# Patient Record
Sex: MALE | Race: WHITE | NOT HISPANIC OR LATINO | Employment: FULL TIME | URBAN - METROPOLITAN AREA
[De-identification: names, ages, dates, MRNs, and addresses within clinical notes are randomized per-mention and may not be internally consistent; named-entity substitution may affect disease eponyms.]

---

## 2018-01-26 ENCOUNTER — CONVERSION ENCOUNTER (OUTPATIENT)
Dept: FAMILY MEDICINE CLINIC | Facility: CLINIC | Age: 58
End: 2018-01-26

## 2018-01-26 ENCOUNTER — OFFICE VISIT CONVERTED (OUTPATIENT)
Dept: FAMILY MEDICINE CLINIC | Facility: CLINIC | Age: 58
End: 2018-01-26
Attending: NURSE PRACTITIONER

## 2018-04-03 ENCOUNTER — OFFICE VISIT CONVERTED (OUTPATIENT)
Dept: FAMILY MEDICINE CLINIC | Facility: CLINIC | Age: 58
End: 2018-04-03
Attending: NURSE PRACTITIONER

## 2018-06-06 ENCOUNTER — OFFICE VISIT CONVERTED (OUTPATIENT)
Dept: FAMILY MEDICINE CLINIC | Facility: CLINIC | Age: 58
End: 2018-06-06
Attending: NURSE PRACTITIONER

## 2018-10-12 ENCOUNTER — OFFICE VISIT CONVERTED (OUTPATIENT)
Dept: FAMILY MEDICINE CLINIC | Facility: CLINIC | Age: 58
End: 2018-10-12
Attending: NURSE PRACTITIONER

## 2018-12-11 ENCOUNTER — CONVERSION ENCOUNTER (OUTPATIENT)
Dept: FAMILY MEDICINE CLINIC | Facility: CLINIC | Age: 58
End: 2018-12-11

## 2018-12-11 ENCOUNTER — OFFICE VISIT CONVERTED (OUTPATIENT)
Dept: FAMILY MEDICINE CLINIC | Facility: CLINIC | Age: 58
End: 2018-12-11
Attending: NURSE PRACTITIONER

## 2019-02-15 ENCOUNTER — OFFICE VISIT CONVERTED (OUTPATIENT)
Dept: FAMILY MEDICINE CLINIC | Facility: CLINIC | Age: 59
End: 2019-02-15
Attending: NURSE PRACTITIONER

## 2019-06-11 ENCOUNTER — CONVERSION ENCOUNTER (OUTPATIENT)
Dept: FAMILY MEDICINE CLINIC | Facility: CLINIC | Age: 59
End: 2019-06-11

## 2019-06-11 ENCOUNTER — OFFICE VISIT CONVERTED (OUTPATIENT)
Dept: FAMILY MEDICINE CLINIC | Facility: CLINIC | Age: 59
End: 2019-06-11
Attending: NURSE PRACTITIONER

## 2019-06-11 ENCOUNTER — HOSPITAL ENCOUNTER (OUTPATIENT)
Dept: LAB | Facility: HOSPITAL | Age: 59
Discharge: HOME | End: 2019-06-11
Attending: NURSE PRACTITIONER

## 2019-06-11 LAB
ALBUMIN SERPL-MCNC: 4.4 G/DL (ref 3.5–5)
ALBUMIN/GLOB SERPL: 1.6 {RATIO} (ref 1.4–2.6)
ALP SERPL-CCNC: 100 U/L (ref 56–119)
ALT SERPL-CCNC: 24 U/L (ref 10–40)
ANION GAP SERPL CALC-SCNC: 17 MMOL/L (ref 8–19)
AST SERPL-CCNC: 25 U/L (ref 15–50)
BASOPHILS # BLD AUTO: 0.07 10*3/UL (ref 0–0.2)
BASOPHILS NFR BLD AUTO: 0.9 % (ref 0–3)
BILIRUB SERPL-MCNC: 0.35 MG/DL (ref 0.2–1.3)
BUN SERPL-MCNC: 12 MG/DL (ref 5–25)
BUN/CREAT SERPL: 12 {RATIO} (ref 6–20)
CALCIUM SERPL-MCNC: 9.2 MG/DL (ref 8.7–10.4)
CHLORIDE SERPL-SCNC: 100 MMOL/L (ref 99–111)
CHOLEST SERPL-MCNC: 192 MG/DL (ref 107–200)
CHOLEST/HDLC SERPL: 4.9 {RATIO} (ref 3–6)
CONV ABS IMM GRAN: 0.03 10*3/UL (ref 0–0.2)
CONV CO2: 28 MMOL/L (ref 22–32)
CONV CREATININE URINE, RANDOM: 172.1 MG/DL (ref 10–300)
CONV IMMATURE GRAN: 0.4 % (ref 0–1.8)
CONV MICROALBUM.,U,RANDOM: <12 MG/L (ref 0–20)
CONV TOTAL PROTEIN: 7.1 G/DL (ref 6.3–8.2)
CREAT UR-MCNC: 1.03 MG/DL (ref 0.7–1.2)
DEPRECATED RDW RBC AUTO: 43.2 FL (ref 35.1–43.9)
EOSINOPHIL # BLD AUTO: 0.47 10*3/UL (ref 0–0.7)
EOSINOPHIL # BLD AUTO: 6.1 % (ref 0–7)
ERYTHROCYTE [DISTWIDTH] IN BLOOD BY AUTOMATED COUNT: 12.9 % (ref 11.6–14.4)
EST. AVERAGE GLUCOSE BLD GHB EST-MCNC: 120 MG/DL
GFR SERPLBLD BASED ON 1.73 SQ M-ARVRAT: >60 ML/MIN/{1.73_M2}
GLOBULIN UR ELPH-MCNC: 2.7 G/DL (ref 2–3.5)
GLUCOSE SERPL-MCNC: 127 MG/DL (ref 70–99)
HBA1C MFR BLD: 14.6 G/DL (ref 14–18)
HBA1C MFR BLD: 5.8 % (ref 3.5–5.7)
HCT VFR BLD AUTO: 44.5 % (ref 42–52)
HDLC SERPL-MCNC: 39 MG/DL (ref 40–60)
LDLC SERPL CALC-MCNC: 110 MG/DL (ref 70–100)
LYMPHOCYTES # BLD AUTO: 1.6 10*3/UL (ref 1–5)
MCH RBC QN AUTO: 30.1 PG (ref 27–31)
MCHC RBC AUTO-ENTMCNC: 32.8 G/DL (ref 33–37)
MCV RBC AUTO: 91.8 FL (ref 80–96)
MICROALBUMIN/CREAT UR: 7 MG/G{CRE} (ref 0–25)
MONOCYTES # BLD AUTO: 0.73 10*3/UL (ref 0.2–1.2)
MONOCYTES NFR BLD AUTO: 9.5 % (ref 3–10)
NEUTROPHILS # BLD AUTO: 4.79 10*3/UL (ref 2–8)
NEUTROPHILS NFR BLD AUTO: 62.3 % (ref 30–85)
NRBC CBCN: 0 % (ref 0–0.7)
OSMOLALITY SERPL CALC.SUM OF ELEC: 293 MOSM/KG (ref 273–304)
PLATELET # BLD AUTO: 216 10*3/UL (ref 130–400)
PMV BLD AUTO: 10.2 FL (ref 9.4–12.4)
POTASSIUM SERPL-SCNC: 4.3 MMOL/L (ref 3.5–5.3)
RBC # BLD AUTO: 4.85 10*6/UL (ref 4.7–6.1)
SODIUM SERPL-SCNC: 141 MMOL/L (ref 135–147)
TRIGL SERPL-MCNC: 214 MG/DL (ref 40–150)
VARIANT LYMPHS NFR BLD MANUAL: 20.8 % (ref 20–45)
VLDLC SERPL-MCNC: 43 MG/DL (ref 5–37)
WBC # BLD AUTO: 7.69 10*3/UL (ref 4.8–10.8)

## 2019-06-20 ENCOUNTER — HOSPITAL ENCOUNTER (OUTPATIENT)
Dept: URGENT CARE | Facility: CLINIC | Age: 59
Discharge: HOME | End: 2019-06-20
Attending: FAMILY MEDICINE

## 2019-10-11 ENCOUNTER — HOSPITAL ENCOUNTER (OUTPATIENT)
Dept: GENERAL RADIOLOGY | Facility: HOSPITAL | Age: 59
Discharge: HOME | End: 2019-10-11
Attending: NURSE PRACTITIONER

## 2019-10-11 ENCOUNTER — OFFICE VISIT CONVERTED (OUTPATIENT)
Dept: FAMILY MEDICINE CLINIC | Facility: CLINIC | Age: 59
End: 2019-10-11
Attending: NURSE PRACTITIONER

## 2019-10-29 ENCOUNTER — OFFICE VISIT CONVERTED (OUTPATIENT)
Dept: FAMILY MEDICINE CLINIC | Facility: CLINIC | Age: 59
End: 2019-10-29
Attending: NURSE PRACTITIONER

## 2019-11-07 ENCOUNTER — HOSPITAL ENCOUNTER (OUTPATIENT)
Dept: GENERAL RADIOLOGY | Facility: HOSPITAL | Age: 59
Discharge: HOME | End: 2019-11-07
Attending: NURSE PRACTITIONER

## 2019-12-04 ENCOUNTER — CONVERSION ENCOUNTER (OUTPATIENT)
Dept: ORTHOPEDIC SURGERY | Facility: CLINIC | Age: 59
End: 2019-12-04

## 2019-12-04 ENCOUNTER — OFFICE VISIT CONVERTED (OUTPATIENT)
Dept: ORTHOPEDIC SURGERY | Facility: CLINIC | Age: 59
End: 2019-12-04
Attending: ORTHOPAEDIC SURGERY

## 2019-12-11 ENCOUNTER — OFFICE VISIT CONVERTED (OUTPATIENT)
Dept: FAMILY MEDICINE CLINIC | Facility: CLINIC | Age: 59
End: 2019-12-11
Attending: NURSE PRACTITIONER

## 2019-12-11 ENCOUNTER — CONVERSION ENCOUNTER (OUTPATIENT)
Dept: FAMILY MEDICINE CLINIC | Facility: CLINIC | Age: 59
End: 2019-12-11

## 2020-01-21 ENCOUNTER — HOSPITAL ENCOUNTER (OUTPATIENT)
Dept: LAB | Facility: HOSPITAL | Age: 60
Discharge: HOME | End: 2020-01-21
Attending: NURSE PRACTITIONER

## 2020-01-21 LAB
ALBUMIN SERPL-MCNC: 4.4 G/DL (ref 3.5–5)
ALBUMIN/GLOB SERPL: 1.6 {RATIO} (ref 1.4–2.6)
ALP SERPL-CCNC: 97 U/L (ref 56–119)
ALT SERPL-CCNC: 27 U/L (ref 10–40)
ANION GAP SERPL CALC-SCNC: 17 MMOL/L (ref 8–19)
AST SERPL-CCNC: 27 U/L (ref 15–50)
BILIRUB SERPL-MCNC: 0.56 MG/DL (ref 0.2–1.3)
BUN SERPL-MCNC: 11 MG/DL (ref 5–25)
BUN/CREAT SERPL: 11 {RATIO} (ref 6–20)
CALCIUM SERPL-MCNC: 9.5 MG/DL (ref 8.7–10.4)
CHLORIDE SERPL-SCNC: 97 MMOL/L (ref 99–111)
CHOLEST SERPL-MCNC: 201 MG/DL (ref 107–200)
CHOLEST/HDLC SERPL: 6.1 {RATIO} (ref 3–6)
CONV CO2: 28 MMOL/L (ref 22–32)
CONV CREATININE URINE, RANDOM: 107.5 MG/DL (ref 10–300)
CONV MICROALBUM.,U,RANDOM: <12 MG/L (ref 0–20)
CONV TOTAL PROTEIN: 7.2 G/DL (ref 6.3–8.2)
CREAT UR-MCNC: 1 MG/DL (ref 0.7–1.2)
EST. AVERAGE GLUCOSE BLD GHB EST-MCNC: 131 MG/DL
GFR SERPLBLD BASED ON 1.73 SQ M-ARVRAT: >60 ML/MIN/{1.73_M2}
GLOBULIN UR ELPH-MCNC: 2.8 G/DL (ref 2–3.5)
GLUCOSE SERPL-MCNC: 179 MG/DL (ref 70–99)
HBA1C MFR BLD: 6.2 % (ref 3.5–5.7)
HDLC SERPL-MCNC: 33 MG/DL (ref 40–60)
LDLC SERPL CALC-MCNC: 118 MG/DL (ref 70–100)
MICROALBUMIN/CREAT UR: 11.2 MG/G{CRE} (ref 0–25)
OSMOLALITY SERPL CALC.SUM OF ELEC: 290 MOSM/KG (ref 273–304)
POTASSIUM SERPL-SCNC: 4.3 MMOL/L (ref 3.5–5.3)
SODIUM SERPL-SCNC: 138 MMOL/L (ref 135–147)
TRIGL SERPL-MCNC: 250 MG/DL (ref 40–150)
VLDLC SERPL-MCNC: 50 MG/DL (ref 5–37)

## 2020-02-14 ENCOUNTER — HOSPITAL ENCOUNTER (OUTPATIENT)
Dept: URGENT CARE | Facility: CLINIC | Age: 60
Discharge: HOME | End: 2020-02-14
Attending: PHYSICIAN ASSISTANT

## 2020-04-14 ENCOUNTER — CONVERSION ENCOUNTER (OUTPATIENT)
Dept: FAMILY MEDICINE CLINIC | Facility: CLINIC | Age: 60
End: 2020-04-14

## 2020-04-14 ENCOUNTER — HOSPITAL ENCOUNTER (OUTPATIENT)
Dept: GENERAL RADIOLOGY | Facility: HOSPITAL | Age: 60
Discharge: HOME | End: 2020-04-14
Attending: NURSE PRACTITIONER

## 2020-04-14 ENCOUNTER — OFFICE VISIT CONVERTED (OUTPATIENT)
Dept: FAMILY MEDICINE CLINIC | Facility: CLINIC | Age: 60
End: 2020-04-14
Attending: NURSE PRACTITIONER

## 2020-05-13 ENCOUNTER — HOSPITAL ENCOUNTER (OUTPATIENT)
Dept: LAB | Facility: HOSPITAL | Age: 60
Discharge: HOME | End: 2020-05-13
Attending: NURSE PRACTITIONER

## 2020-05-13 LAB
ALBUMIN SERPL-MCNC: 4.4 G/DL (ref 3.5–5)
ALBUMIN/GLOB SERPL: 1.6 {RATIO} (ref 1.4–2.6)
ALP SERPL-CCNC: 96 U/L (ref 56–119)
ALT SERPL-CCNC: 30 U/L (ref 10–40)
ANION GAP SERPL CALC-SCNC: 27 MMOL/L (ref 8–19)
APPEARANCE UR: CLEAR
AST SERPL-CCNC: 28 U/L (ref 15–50)
BASOPHILS # BLD AUTO: 0.06 10*3/UL (ref 0–0.2)
BASOPHILS NFR BLD AUTO: 0.9 % (ref 0–3)
BILIRUB SERPL-MCNC: 0.53 MG/DL (ref 0.2–1.3)
BILIRUB UR QL: NEGATIVE
BUN SERPL-MCNC: 12 MG/DL (ref 5–25)
BUN/CREAT SERPL: 10 {RATIO} (ref 6–20)
CALCIUM SERPL-MCNC: 9.5 MG/DL (ref 8.7–10.4)
CHLORIDE SERPL-SCNC: 105 MMOL/L (ref 99–111)
CHOLEST SERPL-MCNC: 214 MG/DL (ref 107–200)
CHOLEST/HDLC SERPL: 5.9 {RATIO} (ref 3–6)
COLOR UR: YELLOW
CONV ABS IMM GRAN: 0.02 10*3/UL (ref 0–0.2)
CONV CO2: 19 MMOL/L (ref 22–32)
CONV COLLECTION SOURCE (UA): NORMAL
CONV CREATININE URINE, RANDOM: 131.8 MG/DL (ref 10–300)
CONV IMMATURE GRAN: 0.3 % (ref 0–1.8)
CONV MICROALBUM.,U,RANDOM: <12 MG/L (ref 0–20)
CONV TOTAL PROTEIN: 7.2 G/DL (ref 6.3–8.2)
CONV UROBILINOGEN IN URINE BY AUTOMATED TEST STRIP: 1 {EHRLICHU}/DL (ref 0.1–1)
CREAT UR-MCNC: 1.17 MG/DL (ref 0.7–1.2)
DEPRECATED RDW RBC AUTO: 43.1 FL (ref 35.1–43.9)
EOSINOPHIL # BLD AUTO: 0.5 10*3/UL (ref 0–0.7)
EOSINOPHIL # BLD AUTO: 7.9 % (ref 0–7)
ERYTHROCYTE [DISTWIDTH] IN BLOOD BY AUTOMATED COUNT: 12.7 % (ref 11.6–14.4)
EST. AVERAGE GLUCOSE BLD GHB EST-MCNC: 151 MG/DL
GFR SERPLBLD BASED ON 1.73 SQ M-ARVRAT: >60 ML/MIN/{1.73_M2}
GLOBULIN UR ELPH-MCNC: 2.8 G/DL (ref 2–3.5)
GLUCOSE SERPL-MCNC: 127 MG/DL (ref 70–99)
GLUCOSE UR QL: NEGATIVE MG/DL
HBA1C MFR BLD: 6.9 % (ref 3.5–5.7)
HCT VFR BLD AUTO: 44 % (ref 42–52)
HDLC SERPL-MCNC: 36 MG/DL (ref 40–60)
HGB BLD-MCNC: 14.2 G/DL (ref 14–18)
HGB UR QL STRIP: NEGATIVE
KETONES UR QL STRIP: NEGATIVE MG/DL
LDLC SERPL CALC-MCNC: 131 MG/DL (ref 70–100)
LEUKOCYTE ESTERASE UR QL STRIP: NEGATIVE
LYMPHOCYTES # BLD AUTO: 1.44 10*3/UL (ref 1–5)
LYMPHOCYTES NFR BLD AUTO: 22.7 % (ref 20–45)
MCH RBC QN AUTO: 30.1 PG (ref 27–31)
MCHC RBC AUTO-ENTMCNC: 32.3 G/DL (ref 33–37)
MCV RBC AUTO: 93.2 FL (ref 80–96)
MICROALBUMIN/CREAT UR: 9.1 MG/G{CRE} (ref 0–25)
MONOCYTES # BLD AUTO: 0.51 10*3/UL (ref 0.2–1.2)
MONOCYTES NFR BLD AUTO: 8.1 % (ref 3–10)
NEUTROPHILS # BLD AUTO: 3.8 10*3/UL (ref 2–8)
NEUTROPHILS NFR BLD AUTO: 60.1 % (ref 30–85)
NITRITE UR QL STRIP: NEGATIVE
NRBC CBCN: 0 % (ref 0–0.7)
OSMOLALITY SERPL CALC.SUM OF ELEC: 303 MOSM/KG (ref 273–304)
PH UR STRIP.AUTO: 5.5 [PH] (ref 5–8)
PLATELET # BLD AUTO: 206 10*3/UL (ref 130–400)
PMV BLD AUTO: 10.2 FL (ref 9.4–12.4)
POTASSIUM SERPL-SCNC: 4.5 MMOL/L (ref 3.5–5.3)
PROT UR QL: NEGATIVE MG/DL
RBC # BLD AUTO: 4.72 10*6/UL (ref 4.7–6.1)
SODIUM SERPL-SCNC: 146 MMOL/L (ref 135–147)
SP GR UR: 1.03 (ref 1–1.03)
TRIGL SERPL-MCNC: 236 MG/DL (ref 40–150)
VLDLC SERPL-MCNC: 47 MG/DL (ref 5–37)
WBC # BLD AUTO: 6.33 10*3/UL (ref 4.8–10.8)

## 2020-06-12 ENCOUNTER — OFFICE VISIT CONVERTED (OUTPATIENT)
Dept: FAMILY MEDICINE CLINIC | Facility: CLINIC | Age: 60
End: 2020-06-12
Attending: NURSE PRACTITIONER

## 2020-07-13 ENCOUNTER — OFFICE VISIT CONVERTED (OUTPATIENT)
Dept: FAMILY MEDICINE CLINIC | Facility: CLINIC | Age: 60
End: 2020-07-13
Attending: NURSE PRACTITIONER

## 2020-07-13 ENCOUNTER — CONVERSION ENCOUNTER (OUTPATIENT)
Dept: FAMILY MEDICINE CLINIC | Facility: CLINIC | Age: 60
End: 2020-07-13

## 2020-08-03 ENCOUNTER — HOSPITAL ENCOUNTER (OUTPATIENT)
Dept: URGENT CARE | Facility: CLINIC | Age: 60
Discharge: HOME | End: 2020-08-03
Attending: NURSE PRACTITIONER

## 2020-08-05 LAB — SARS-COV-2 RNA SPEC QL NAA+PROBE: NOT DETECTED

## 2020-09-14 ENCOUNTER — OFFICE VISIT CONVERTED (OUTPATIENT)
Dept: ORTHOPEDIC SURGERY | Facility: CLINIC | Age: 60
End: 2020-09-14
Attending: ORTHOPAEDIC SURGERY

## 2020-09-14 ENCOUNTER — CONVERSION ENCOUNTER (OUTPATIENT)
Dept: ORTHOPEDIC SURGERY | Facility: CLINIC | Age: 60
End: 2020-09-14

## 2020-10-02 ENCOUNTER — HOSPITAL ENCOUNTER (OUTPATIENT)
Dept: LAB | Facility: HOSPITAL | Age: 60
Discharge: HOME | End: 2020-10-02
Attending: NURSE PRACTITIONER

## 2020-10-02 ENCOUNTER — OFFICE VISIT CONVERTED (OUTPATIENT)
Dept: FAMILY MEDICINE CLINIC | Facility: CLINIC | Age: 60
End: 2020-10-02
Attending: NURSE PRACTITIONER

## 2020-10-02 LAB
ALBUMIN SERPL-MCNC: 4.6 G/DL (ref 3.5–5)
ALBUMIN/GLOB SERPL: 1.8 {RATIO} (ref 1.4–2.6)
ALP SERPL-CCNC: 101 U/L (ref 56–119)
ALT SERPL-CCNC: 25 U/L (ref 10–40)
ANION GAP SERPL CALC-SCNC: 18 MMOL/L (ref 8–19)
AST SERPL-CCNC: 27 U/L (ref 15–50)
BILIRUB SERPL-MCNC: 0.83 MG/DL (ref 0.2–1.3)
BUN SERPL-MCNC: 16 MG/DL (ref 5–25)
BUN/CREAT SERPL: 15 {RATIO} (ref 6–20)
CALCIUM SERPL-MCNC: 9.1 MG/DL (ref 8.7–10.4)
CHLORIDE SERPL-SCNC: 97 MMOL/L (ref 99–111)
CHOLEST SERPL-MCNC: 140 MG/DL (ref 107–200)
CHOLEST/HDLC SERPL: 2.7 {RATIO} (ref 3–6)
CONV CO2: 27 MMOL/L (ref 22–32)
CONV CREATININE URINE, RANDOM: 108.3 MG/DL (ref 10–300)
CONV MICROALBUM.,U,RANDOM: <12 MG/L (ref 0–20)
CONV TOTAL PROTEIN: 7.2 G/DL (ref 6.3–8.2)
CREAT UR-MCNC: 1.04 MG/DL (ref 0.7–1.2)
EST. AVERAGE GLUCOSE BLD GHB EST-MCNC: 131 MG/DL
GFR SERPLBLD BASED ON 1.73 SQ M-ARVRAT: >60 ML/MIN/{1.73_M2}
GLOBULIN UR ELPH-MCNC: 2.6 G/DL (ref 2–3.5)
GLUCOSE SERPL-MCNC: 116 MG/DL (ref 70–99)
HBA1C MFR BLD: 6.2 % (ref 3.5–5.7)
HDLC SERPL-MCNC: 51 MG/DL (ref 40–60)
LDLC SERPL CALC-MCNC: 66 MG/DL (ref 70–100)
MICROALBUMIN/CREAT UR: 11.1 MG/G{CRE} (ref 0–25)
OSMOLALITY SERPL CALC.SUM OF ELEC: 288 MOSM/KG (ref 273–304)
POTASSIUM SERPL-SCNC: 4.2 MMOL/L (ref 3.5–5.3)
SODIUM SERPL-SCNC: 138 MMOL/L (ref 135–147)
TRIGL SERPL-MCNC: 117 MG/DL (ref 40–150)
VLDLC SERPL-MCNC: 23 MG/DL (ref 5–37)

## 2020-12-18 ENCOUNTER — OFFICE VISIT CONVERTED (OUTPATIENT)
Dept: FAMILY MEDICINE CLINIC | Facility: CLINIC | Age: 60
End: 2020-12-18
Attending: NURSE PRACTITIONER

## 2020-12-18 ENCOUNTER — CONVERSION ENCOUNTER (OUTPATIENT)
Dept: FAMILY MEDICINE CLINIC | Facility: CLINIC | Age: 60
End: 2020-12-18

## 2021-03-13 ENCOUNTER — HOSPITAL ENCOUNTER (OUTPATIENT)
Dept: OTHER | Facility: HOSPITAL | Age: 61
Discharge: HOME | End: 2021-03-13
Attending: NURSE PRACTITIONER

## 2021-03-13 LAB
ALBUMIN SERPL-MCNC: 4 G/DL (ref 3.5–5)
ALBUMIN/GLOB SERPL: 1.5 {RATIO} (ref 1.4–2.6)
ALP SERPL-CCNC: 83 U/L (ref 56–119)
ALT SERPL-CCNC: 18 U/L (ref 10–40)
ANION GAP SERPL CALC-SCNC: 11 MMOL/L (ref 8–19)
AST SERPL-CCNC: 22 U/L (ref 15–50)
BILIRUB SERPL-MCNC: 0.72 MG/DL (ref 0.2–1.3)
BUN SERPL-MCNC: 12 MG/DL (ref 5–25)
BUN/CREAT SERPL: 12 {RATIO} (ref 6–20)
CALCIUM SERPL-MCNC: 9 MG/DL (ref 8.7–10.4)
CHLORIDE SERPL-SCNC: 100 MMOL/L (ref 99–111)
CHOLEST SERPL-MCNC: 121 MG/DL (ref 107–200)
CHOLEST/HDLC SERPL: 3.1 {RATIO} (ref 3–6)
CONV CO2: 30 MMOL/L (ref 22–32)
CONV CREATININE URINE, RANDOM: 132.6 MG/DL (ref 10–300)
CONV MICROALBUM.,U,RANDOM: <12 MG/L (ref 0–20)
CONV TOTAL PROTEIN: 6.7 G/DL (ref 6.3–8.2)
CREAT UR-MCNC: 1.02 MG/DL (ref 0.7–1.2)
EST. AVERAGE GLUCOSE BLD GHB EST-MCNC: 126 MG/DL
GFR SERPLBLD BASED ON 1.73 SQ M-ARVRAT: >60 ML/MIN/{1.73_M2}
GLOBULIN UR ELPH-MCNC: 2.7 G/DL (ref 2–3.5)
GLUCOSE SERPL-MCNC: 112 MG/DL (ref 70–99)
HBA1C MFR BLD: 6 % (ref 3.5–5.7)
HDLC SERPL-MCNC: 39 MG/DL (ref 40–60)
LDLC SERPL CALC-MCNC: 58 MG/DL (ref 70–100)
MICROALBUMIN/CREAT UR: 9 MG/G{CRE} (ref 0–25)
OSMOLALITY SERPL CALC.SUM OF ELEC: 285 MOSM/KG (ref 273–304)
POTASSIUM SERPL-SCNC: 4.2 MMOL/L (ref 3.5–5.3)
SODIUM SERPL-SCNC: 137 MMOL/L (ref 135–147)
TRIGL SERPL-MCNC: 122 MG/DL (ref 40–150)
VLDLC SERPL-MCNC: 24 MG/DL (ref 5–37)

## 2021-03-18 ENCOUNTER — CONVERSION ENCOUNTER (OUTPATIENT)
Dept: OTHER | Facility: HOSPITAL | Age: 61
End: 2021-03-18

## 2021-03-18 ENCOUNTER — OFFICE VISIT CONVERTED (OUTPATIENT)
Dept: FAMILY MEDICINE CLINIC | Facility: CLINIC | Age: 61
End: 2021-03-18
Attending: NURSE PRACTITIONER

## 2021-04-01 ENCOUNTER — HOSPITAL ENCOUNTER (OUTPATIENT)
Dept: VACCINE CLINIC | Facility: HOSPITAL | Age: 61
Discharge: HOME | End: 2021-04-01
Attending: INTERNAL MEDICINE

## 2021-04-23 ENCOUNTER — HOSPITAL ENCOUNTER (OUTPATIENT)
Dept: VACCINE CLINIC | Facility: HOSPITAL | Age: 61
Discharge: HOME | End: 2021-04-23
Attending: INTERNAL MEDICINE

## 2021-05-10 NOTE — H&P
History and Physical      Patient Name: Idris Park   Patient ID: [id number]   Sex: Male   YOB: 1960    Primary Care Provider: Dae CARRILLO   Referring Provider: Dae CARRILLO    Visit Date: September 14, 2020    Provider: Jb Campos MD   Location: [facility]   Location Address: [address] [place] [address]  [id number]   Location Phone: [phone]          Chief Complaint  · Right Knee Pain  · Left Shoulder Pain      History Of Present Illness  Idris Park is a 60 year old /White male who presents today to [facility]. Patient is here following up for right knee pain and left shoulder pain. Patient states the knee pain is on the medial and lateral sides of his knee. He has pain with descending on stairs. Patient has a history of left total knee arthroplasty performed 5 years ago and is doing very well. Patient states increasing pain of the left shoulder, pointing to around the AC joint. Patient has had a MRI on his left shoulder.       Past Medical History  ***No Significant Medical History; Arthritis; Diabetes; Limb Pain; Limb Swelling; Screening for colon cancer         Past Surgical History  Artificial Joints/Limbs; Hernia; Joint Surgery         Medication List  Co Q-10 300 mg oral capsule; etodolac 300 mg oral capsule; Fish Oil 340-1,000 mg oral capsule; Januvia 50 mg oral tablet; Lasix 40 mg oral tablet; Neurontin 300 mg oral capsule; rosuvastatin 5 mg oral tablet; tramadol 50 mg oral tablet         Allergy List  Keflex         Family Medical History  Cancer, Unspecified; Renal Calculus; Family history of breast cancer         Social History  Alcohol (Current some day); Alcohol Use (Current some day); Caffeine (Current every day); lives with spouse; .; Recreational Drug Use (Never); Second hand smoke exposure (Never); Tobacco (Never); Working         Review of Systems  · Constitutional  o Denies  o : fever, chills, weight  "loss  · Cardiovascular  o Denies  o : chest pain, shortness of breath  · Gastrointestinal  o Denies  o : liver disease, heartburn, nausea, blood in stools  · Genitourinary  o Denies  o : painful urination, blood in urine  · Integument  o Denies  o : rash, itching  · Neurologic  o Denies  o : headache, weakness, loss of consciousness  · Musculoskeletal  o Admits  o : painful, swollen joints  · Psychiatric  o Denies  o : drug/alcohol addiction, anxiety, depression      Vitals  Date Time BP Position Site L\R Cuff Size HR RR TEMP (F) WT  HT  BMI kg/m2 BSA m2 O2 Sat        09/14/2020 09:01 AM         258lbs 0oz 5'  9\" 38.1 2.39           Physical Examination  · Constitutional  o Appearance  o : well developed, well-nourished, no obvious deformities present  · Head and Face  o Head  o :   § Inspection  § : normocephalic  o Face  o :   § Inspection  § : no facial lesions  · Eyes  o Conjunctivae  o : conjunctivae normal  o Sclerae  o : sclerae white  · Ears, Nose, Mouth and Throat  o Ears  o :   § External Ears  § : appearance within normal limits  § Hearing  § : intact  o Nose  o :   § External Nose  § : appearance normal  · Neck  o Inspection/Palpation  o : normal appearance  o Range of Motion  o : full range of motion  · Respiratory  o Respiratory Effort  o : breathing unlabored  o Inspection of Chest  o : normal appearance  o Auscultation of Lungs  o : no audible wheezing or rales  · Cardiovascular  o Heart  o : regular rate  · Gastrointestinal  o Abdominal Examination  o : soft and non-tender  · Skin and Subcutaneous Tissue  o General Inspection  o : intact, no rashes  · Psychiatric  o General  o : Alert and oriented x3  o Judgement and Insight  o : judgment and insight intact  o Mood and Affect  o : mood normal, affect appropriate  · Left Shoulder  o Inspection  o : No skin discoloration atrophy or swelling. Palpable tenderness over the AC joint. Palpable tenderness subacromial bursa. Full range of motion. Negative " empty can. Negative Neers. Negative Guzman. Neurovascularly and sensation grossly intact.   · Right Knee  o Inspection  o : No skin discoloration, atrophy or swelling. Genu varus deformity. Full extension. Full flexion. Mild tenderness on medial and lateral joint line. Calf supple, nontender. Neurovascularly intact. X-ray right knee is advanced osteoarthritis, Kelligren-Sushil score of 4.   · Injection Note/Aspiration Note  o Site  o : right knee  o Procedure  o : Procedure: After educating the patient, patient gave consent for procedure. After using Chloraprep, the joint space was injected. The patient tolerated the procedure well.   o Medication  o : 80 mg of DepoMedrol with 9cc of 1% Lidocaine  · In Office Procedures  o View  o : LAT/SUNRISE/STANDING   o Site  o : right, knee  o Indication  o : Right knee pain  o Study  o : X-rays ordered, taken in the office, and reviewed today.          Assessment  · Primary osteoarthritis of right knee     715.16/M17.11  · Primary osteoarthritis of left AC joint.     715.11/M19.019  · Right knee pain, unspecified chronicity     719.46/M25.561  · Left shoulder pain, unspecified chronicity     719.41/M25.512      Plan  · Orders  o Depo-Medrol injection 80mg () - - 09/14/2020   Lot [id number] Exp 07 2021 [employer] Administered by JOSEPH Campos MD  o Knee Intra-articular Injection without US Guidance [facility] ([id number]) - - 09/14/2020   Lot [id number] Exp 08 01 2021 [employer] Administered by JOSEPH Campos MD  o Knee (Right) [facility] Preferred View ([id number]) - 719.46/M25.561 - 09/14/2020  · Medications  o Medications have been Reconciled  o Transition of Care or Provider Policy  · Instructions  o Reviewed the patient's Past Medical, Social, and Family history as well as the ROS at today's visit, no changes.  o Call or return if worsening symptoms.  o This note is transcribed by Eunice barkley/adriana  o Right knee steroid injection. We discussed potential of right total knee arthroplasty.              Electronically Signed by: Eunice Denny, -Author on September 14, 2020 03:29:53 PM  Electronically Co-signed by: Neris Marino PA-C -Reviewer on September 14, 2020 04:57:02 PM  Electronically Co-signed by: Jb Campos MD -Reviewer on September 14, 2020 05:31:22 PM

## 2021-05-12 NOTE — PROGRESS NOTES
Progress Note      Patient Name: Idris Park   Patient ID: [id number]   Sex: Male   YOB: 1960    Primary Care Provider: Dae CARRILLO   Referring Provider: Dae CARRILLO    Visit Date: April 14, 2020    Provider: LORENA Olivares   Location: [facility]   Location Address: [address] [place] [address], [address]  [id number]   Location Phone: [phone]          Chief Complaint     The patient is here for left shoulder pain, fell out of bed due to muscle cramp       History Of Present Illness  Idris Park is a 60 year old /White male who presents for evaluation and treatment of:      left shoulder pain for the past 3 weeks.  States he frequently has muscle cramps at night and he fell out of bed a few weeks ago.  The pain is intermittent and described as sharp, only lasting a few seconds.  He does not take any additional medication for the pain other than what he already has prescribed.  He uses Biofreeze nightly before he goes to work.  He does not notice any specific precipitating factors.  He denies numbness or tingling in his arm.       Past Medical History  Disease Name Date Onset Notes   Arthritis --  --    Diabetes --  --    Limb Pain --  --    Limb Swelling --  --    Screening for colon cancer 11/23/17 Cologstaci         Past Surgical History  Procedure Name Date Notes   Artificial Joints/Limbs --  --    Hernia 1967 & 2011 --    Joint Surgery --  --          Medication List  Name Date Started Instructions   atorvastatin 10 mg oral tablet 01/21/2020 take 1 tablet (10 mg) by oral route once daily at bedtime for 30 days   cyclobenzaprine 10 mg oral tablet 10/11/2019 take 1 tablet (10 mg) by oral route 3 times per day for 14 days   etodolac 300 mg oral capsule 03/06/2020 TAKE 1 CAPSULE BY MOUTH TWICE DAILY WITH FOOD   Fish Oil 340-1,000 mg oral capsule  take 1 capsule by oral route daily for 30 days   Januvia 50 mg oral tablet 10/04/2019 TAKE 1 TABLET BY  "MOUTH ONCE DAILY   Lasix 20 mg oral tablet 12/11/2019 TAKE 1 TABLET BY MOUTH ONCE DAILY   Neurontin 300 mg oral capsule 03/30/2020 TAKE 1 CAPSULE BY ORAL ROUTE 3 TIMES PER DAY   tramadol 50 mg oral tablet 11/04/2019 take 1 tablet (50 mg) by oral route every 6 hours as needed for 30 days         Allergy List  Allergen Name Date Reaction Notes   Keflex --  rash --          Family Medical History  Disease Name Relative/Age Notes   Cancer, Unspecified Mother/   Mother   Renal Calculus Father/   Father   Family history of breast cancer Mother/50s   Mother/50s         Social History  Finding Status Start/Stop Quantity Notes   Alcohol Current some day 0/0 --  drinks rarely; wine and beer   Alcohol Use Current some day --/-- --  rarely drinks   Caffeine Current every day 0/0 --  drinks regularly; coffee, tea and soft drinks; 3-4 times per day   lives with spouse --  --/-- --  --    . --  --/-- --  --    Recreational Drug Use Never --/-- --  no   Second hand smoke exposure Never 0/0 --  no   Tobacco Never --/-- --  never smoker  never smoker  never a smoker   Working --  --/-- --  --          Immunizations  NameDate Admin Mfg Trade Name Lot Number Route Inj VIS Given VIS Publication   InfluenzaRefused 06/11/2019 NE Not Entered  NE NE     Comments:          Review of Systems  · Constitutional  o Denies  o : fever, fatigue, weight loss, weight gain  · Cardiovascular  o Denies  o : lower extremity edema, claudication, chest pressure, palpitations  · Respiratory  o Denies  o : shortness of breath, wheezing, cough, hemoptysis, dyspnea on exertion  · Gastrointestinal  o Denies  o : nausea, vomiting, diarrhea, constipation, abdominal pain  · Musculoskeletal  o Admits  o : joint pain, shoulder pain      Vitals  Date Time BP Position Site L\R Cuff Size HR RR TEMP (F) WT  HT  BMI kg/m2 BSA m2 O2 Sat HC       04/14/2020 01:08 /76 Sitting    78 - R 16 97.8 251lbs 5oz 5'  10\" 36.06 2.37 96 %          Physical " Examination  · Constitutional  o Appearance  o : well-nourished, well developed, alert, in no acute distress  · Eyes  o Conjunctivae  o : conjunctivae normal  o Sclerae  o : sclerae white  o Pupils and Irises  o : pupils equal, round, and reactive to light and accommodation bilaterally  o Corneas  o : tear film normal, no lesions present  o Eyelids/Ocular Adnexae  o : eyelid appearance normal, no exudates present, eye moisture level normal  · Respiratory  o Respiratory Effort  o : breathing unlabored, no accessory muscle use  o Inspection of Chest  o : normal appearance, no retractions  o Auscultation of Lungs  o : normal breath sounds throughout  · Cardiovascular  o Heart  o :   § Auscultation of Heart  § : regular rate and rhythm without murmur, PMI normal  o Peripheral Vascular System  o :   § Carotid Arteries  § : normal pulses bilaterally, no bruits present  § Pedal Pulses  § : pulses 2 bilaterally  § Extremities  § : no cyanosis, clubbing or edema; less than 2 second refill noted  · Musculoskeletal  o General  o : Full range of motion. Mild tenderness upon palpation of left scapula. No joint swelling or deformity noted. Muscle tone, strength and development grossly normal.  · Skin and Subcutaneous Tissue  o General Inspection  o : no rashes or lesions present, no areas of discoloration  · Neurologic  o Mental Status Examination  o : judgement, insight intact, modd and affect appropriate  o Motor Examination  o : strength grossly intact in all four extremities  o Gait and Station  o : normal gait, able to stand without difficulty          Assessment  · Screening for depression     V79.0/Z13.89  · Diabetes mellitus, type 2     250.00/E11.9  · Hyperlipidemia     272.4/E78.5  · Shoulder pain, left     719.41/M25.512       will xray and try muscle relaxer if doesn't help will possibly put off work.  Encouraged to start statin       Plan  · Orders  o Annual depression screening, 15 minutes (, 12085) -  V79.0/Z13.89 - 04/14/2020  o ACO-18: Negative screen for clinical depression using a standardized tool () - V79.0/Z13.89 - 04/14/2020  o Diabetes 2 Panel (Urine Microalbumin, CMP, Lipid, A1c, ) [place] (49200, 11472, 29946, 73875) - 250.00/E11.9 - 04/14/2020  o CBC with Auto Diff [place] (44146) - 250.00/E11.9 - 04/14/2020  o Urinalysis with Reflex Microscopy if abnormal ([place]) (97307) - 250.00/E11.9 - 04/14/2020  o ACO-39: Current medications updated and reviewed () - - 04/14/2020  o ACO-14: Influenza immunization was not administered for reasons documented () - - 04/14/2020  o Shoulder (Left) 2 or more views X-Ray [place] Preferred View ([id number]) - 719.41/M25.512 - 04/14/2020  · Medications  o cyclobenzaprine 10 mg oral tablet   SIG: take 1 tablet (10 mg) by oral route 3 times per day for 30 days   DISP: (90) tablets with 2 refills  Adjusted on 04/14/2020     o Medications have been Reconciled  o Transition of Care or Provider Policy  · Instructions  o Depression Screen completed and scanned into the EMR under the designated folder within the patient's documents.  o Today's PHQ-9 result is _0__  o Advised that cheeses and other sources of dairy fats, animal fats, fast food, and the extras (candy, pastries, pies, doughnuts and cookies) all contain LDL raising nutrients. Advised to increase fruits, vegetables, whole grains, and to monitor portion sizes.   o Patient was educated/instructed on their diagnosis, treatment and medications prior to discharge from the clinic today.  o Minutes spent with patient including greater than 50% in Education/Counseling/Care Coordination.  o Time spent with the patient was minutes, more than 50% face to face.  · Disposition  o Will call with results and further testing  o F/u as scheduled.            Electronically Signed by: LORENA Olivares -Author on April 14, 2020 01:44:54 PM

## 2021-05-13 NOTE — PROGRESS NOTES
Progress Note      Patient Name: Idris Park   Patient ID: [id number]   Sex: Male   YOB: 1960    Primary Care Provider: Dae CARRILLO   Referring Provider: Dae CARRILLO    Visit Date: July 13, 2020    Provider: LORENA Olivares   Location: [facility]   Location Address: [address] [place] [address], [address]  [id number]   Location Phone: [phone]          Chief Complaint     Sore throat for 5 days   No other s/s       History Of Present Illness  Idris Park is a 60 year old /White male who presents for evaluation and treatment of:      This started sudden onset on Thursday.  Is not gotten any better.  Denies a fever denies shortness of air denies a headache.  Difficulty with swallowing.  Denies extreme fatigue more so than his normal for working night shift.       Past Medical History  Disease Name Date Onset Notes   Arthritis --  --    Diabetes --  --    Limb Pain --  --    Limb Swelling --  --    Screening for colon cancer 11/23/17 Cologuard         Past Surgical History  Procedure Name Date Notes   Artificial Joints/Limbs --  --    Hernia 1967 & 2011 --    Joint Surgery --  --          Medication List  Name Date Started Instructions   Crestor 5 mg oral tablet 05/15/2020 take 1 tablet (5 mg) by oral route once daily at bedtime for 90 days   cyclobenzaprine 10 mg oral tablet 04/14/2020 take 1 tablet (10 mg) by oral route 3 times per day for 30 days   etodolac 300 mg oral capsule 03/06/2020 TAKE 1 CAPSULE BY MOUTH TWICE DAILY WITH FOOD   Fish Oil 340-1,000 mg oral capsule  take 1 capsule by oral route daily for 30 days   Januvia 50 mg oral tablet 10/04/2019 TAKE 1 TABLET BY MOUTH ONCE DAILY   Lasix 20 mg oral tablet 05/11/2020 TAKE 1 TABLET BY MOUTH ONCE DAILY   Neurontin 300 mg oral capsule 03/30/2020 TAKE 1 CAPSULE BY ORAL ROUTE 3 TIMES PER DAY   tramadol 50 mg oral tablet 05/11/2020 take 1 tablet (50 mg) by oral route every 6 hours as needed for 30  days         Allergy List  Allergen Name Date Reaction Notes   Keflex --  rash --          Family Medical History  Disease Name Relative/Age Notes   Cancer, Unspecified Mother/   Mother   Renal Calculus Father/   Father   Family history of breast cancer Mother/50s   Mother/50s         Social History  Finding Status Start/Stop Quantity Notes   Alcohol Current some day 0/0 --  drinks rarely; wine and beer   Alcohol Use Current some day --/-- --  rarely drinks   Caffeine Current every day 0/0 --  drinks regularly; coffee, tea and soft drinks; 3-4 times per day   lives with spouse --  --/-- --  --    . --  --/-- --  --    Recreational Drug Use Never --/-- --  no   Second hand smoke exposure Never 0/0 --  no   Tobacco Never --/-- --  never smoker  never smoker  never a smoker   Working --  --/-- --  --          Immunizations  NameDate Admin Mfg Trade Name Lot Number Route Inj VIS Given VIS Publication   InfluenzaRefused 06/11/2019 NE Not Entered  NE NE     Comments:          Review of Systems  · Constitutional  o Denies  o : fatigue, night sweats  · Eyes  o Denies  o : double vision, blurred vision  · HENT  o Admits  o : sore throat  o Denies  o : vertigo, recent head injury  · Breasts  o Denies  o : abnormal changes in breast size, additional breast symptoms except as noted in the HPI  · Cardiovascular  o Denies  o : chest pain, irregular heart beats  · Respiratory  o Denies  o : shortness of breath, productive cough  · Gastrointestinal  o Denies  o : nausea, vomiting  · Genitourinary  o Denies  o : dysuria, urinary retention  · Integument  o Denies  o : hair growth change, new skin lesions  · Neurologic  o Denies  o : altered mental status, seizures  · Musculoskeletal  o Denies  o : joint swelling, limitation of motion  · Endocrine  o Denies  o : cold intolerance, heat intolerance  · Heme-Lymph  o Denies  o : petechiae, lymph node enlargement or tenderness  · Allergic-Immunologic  o Denies  o : frequent  "illnesses      Vitals  Date Time BP Position Site L\R Cuff Size HR RR TEMP (F) WT  HT  BMI kg/m2 BSA m2 O2 Sat HC       07/13/2020 11:15 /68 Sitting    55 - R 18 97.6 248lbs 0oz 5'  10\" 35.58 2.36 95 %          Physical Examination  · Constitutional  o Appearance  o : well-nourished, well developed, alert, in no acute distress  · Eyes  o Conjunctivae  o : conjunctivae normal  o Sclerae  o : sclerae white  o Pupils and Irises  o : pupils equal, round, and reactive to light and accommodation bilaterally  o Corneas  o : tear film normal, no lesions present  o Eyelids/Ocular Adnexae  o : eyelid appearance normal, no exudates present, eye moisture level normal  · Ears, Nose, Mouth and Throat  o Ears  o : external ear auricle normal, otic canal normal, TM with no reddness, effusion, retraction  o Nose  o : external normal, nasal mucosa normal, turbinates normal  o Oral Cavity  o : tongue no lesion, oral mucosa normal  o Throat  o : erythemia, without exudate or lesions  · Neck  o Inspection/Palpation  o : normal appearance, no masses or tenderness, trachea midline, no enlarged cervical or supraclavicular lymphnodes palpated  o Thyroid  o : gland size normal, nontender, no nodules or masses present on palpation, thyroid motion normal during swallowing  · Respiratory  o Respiratory Effort  o : breathing unlabored  o Inspection of Chest  o : normal appearance, no retractions  o Auscultation of Lungs  o : normal breath sounds throughout  · Cardiovascular  o Heart  o :   § Auscultation of Heart  § : regular rate and rhythm without murmur, PMI normal  o Peripheral Vascular System  o :   § Extremities  § : no cyanosis, clubbing or edema; less than 2 second refill noted  · Musculoskeletal  o General  o : No joint swelling or deformity noted. Muscle tone, strength and development grossly normal.  · Skin and Subcutaneous Tissue  o General Inspection  o : no rashes or lesions present, no areas of " discoloration  · Neurologic  o Mental Status Examination  o : judgement, insight intact, modd and affect appropriate  o Motor Examination  o : strength grossly intact in all four extremities  o Gait and Station  o : normal gait, able to stand without difficulty          Assessment  · Pharyngitis, acute     462/J02.9    Problems Reconciled  Plan  · Orders  o ACO-39: Current medications updated and reviewed () - - 07/13/2020  o ACO-14: Influenza immunization was not administered for reasons documented () - - 07/13/2020  · Medications  o Zithromax Z-Virgilio 250 mg oral tablet   SIG: take 2 tablets (500 mg) by oral route once daily for 1 day then 1 tablet (250 mg) by oral route once daily for 4 days   DISP: (6) tablets with 0 refills  Prescribed on 07/13/2020     o Lasix 40 mg oral tablet   SIG: take 1 tablet (40 mg) by oral route once daily for 90 days   DISP: (90) tablets with 1 refills  Adjusted on 07/13/2020     o tramadol 50 mg oral tablet   SIG: take 1 tablet (50 mg) by oral route every 6 hours as needed for 30 days   DISP: (120) tablets with 2 refills  Refilled on 07/13/2020     o prednisone 20 mg oral tablet   SIG: take 2 tablets (40 mg) by oral route once daily for 4 days   DISP: (8) tablet with 0 refills  Discontinued on 07/13/2020     o Medications have been Reconciled  o Transition of Care or Provider Policy  · Instructions  o Patient was educated/instructed on their diagnosis, treatment and medications prior to discharge from the clinic today.  o Minutes spent with patient including greater than 50% in Education/Counseling/Care Coordination.  o Time spent with the patient was minutes, more than 50% face to face.  · Disposition  o Call or Return if symptoms worsen or persist.            Electronically Signed by: LORENA Oilvares -Author on July 13, 2020 11:55:46 AM

## 2021-05-13 NOTE — PROGRESS NOTES
Progress Note      Patient Name: Idris Park   Patient ID: [id number]   Sex: Male   YOB: 1960    Primary Care Provider: Dae CARRILLO   Referring Provider: Dae CARRILLO    Visit Date: October 2, 2020    Provider: LORENA Olivares   Location: [facility]   Location Address: [address] [place] [address], [address]  [id number]   Location Phone: [phone]          Chief Complaint     The patient is here for leg cramps       History Of Present Illness  Idris Park is a 60 year old /White male who presents for evaluation and treatment of:      legs cramping and restless when he is sleeping but will get cramps.  Has tessa happening over 3 weeks.  Will start even when sitting.  doesn't happen at work.       Past Medical History  Disease Name Date Onset Notes   ***No Significant Medical History --  --    Arthritis --  --    Diabetes --  --    Limb Pain --  --    Limb Swelling --  --    Screening for colon cancer 11/23/17 Cologuard         Past Surgical History  Procedure Name Date Notes   Artificial Joints/Limbs --  --    Hernia 1967 & 2011 --    Joint Surgery --  --          Medication List  Name Date Started Instructions   Co Q-10 300 mg oral capsule  take 1 capsule by oral route daily   etodolac 300 mg oral capsule 08/06/2020 TAKE 1 CAPSULE BY MOUTH TWICE DAILY WITH FOOD   Fish Oil 340-1,000 mg oral capsule  take 1 capsule by oral route daily for 30 days   Januvia 50 mg oral tablet 07/31/2020 TAKE 1 TABLET BY MOUTH ONCE DAILY   Lasix 40 mg oral tablet 07/13/2020 take 1 tablet (40 mg) by oral route once daily for 90 days   Neurontin 300 mg oral capsule 03/30/2020 TAKE 1 CAPSULE BY ORAL ROUTE 3 TIMES PER DAY   rosuvastatin 5 mg oral tablet  take 1 tablet (5 mg) by oral route once daily   tramadol 50 mg oral tablet 07/13/2020 take 1 tablet (50 mg) by oral route every 6 hours as needed for 30 days         Allergy List  Allergen Name Date Reaction  "Notes   Keflex --  rash --        Allergies Reconciled  Family Medical History  Disease Name Relative/Age Notes   Cancer, Unspecified Mother/   Mother   Renal Calculus Father/   Father   Family history of breast cancer Mother/50s   Mother/50s         Social History  Finding Status Start/Stop Quantity Notes   Alcohol Current some day 0/0 --  drinks rarely; wine and beer   Alcohol Use Current some day --/-- --  rarely drinks   Caffeine Current every day 0/0 --  drinks regularly; coffee, tea and soft drinks; 3-4 times per day   lives with spouse --  --/-- --  --    . --  --/-- --  --    Recreational Drug Use Never --/-- --  no   Second hand smoke exposure Never 0/0 --  no   Tobacco Never --/-- --  never smoker  never smoker  never smoker  never a smoker   Working --  --/-- --  --          Immunizations  NameDate Admin Mfg Trade Name Lot Number Route Inj VIS Given VIS Publication   InfluenzaRefused 06/11/2019 NE Not Entered  NE NE     Comments:          Review of Systems  · Constitutional  o Denies  o : fever, fatigue, weight loss, weight gain  · Cardiovascular  o Denies  o : lower extremity edema, claudication, chest pressure, palpitations  · Respiratory  o Denies  o : shortness of breath, wheezing, cough, hemoptysis, dyspnea on exertion  · Gastrointestinal  o Denies  o : nausea, vomiting, diarrhea, constipation, abdominal pain  · Musculoskeletal  o Admits  o : muscle cramps      Vitals  Date Time BP Position Site L\R Cuff Size HR RR TEMP (F) WT  HT  BMI kg/m2 BSA m2 O2 Sat FR L/min FiO2 HC       10/02/2020 02:59 /70 Sitting    57 - R 18 97.2 243lbs 16oz 5'  10\" 35.01 2.34 94 %  21%          Physical Examination  · Constitutional  o Appearance  o : well-nourished, well developed, alert, in no acute distress  · Eyes  o Conjunctivae  o : conjunctivae normal  o Sclerae  o : sclerae white  o Pupils and Irises  o : pupils equal, round, and reactive to light and accommodation bilaterally  o Corneas  o : tear " film normal, no lesions present  o Eyelids/Ocular Adnexae  o : eyelid appearance normal, no exudates present, eye moisture level normal  · Respiratory  o Respiratory Effort  o : breathing unlabored  o Inspection of Chest  o : normal appearance, no retractions  o Auscultation of Lungs  o : normal breath sounds throughout  · Cardiovascular  o Heart  o :   § Auscultation of Heart  § : regular rate and rhythm without murmur, PMI normal  o Peripheral Vascular System  o :   § Extremities  § : no cyanosis, clubbing or edema; less than 2 second refill noted  · Musculoskeletal  o General  o : No joint swelling or deformity noted. Muscle tone, strength and development grossly normal.  · Skin and Subcutaneous Tissue  o General Inspection  o : no rashes or lesions present, no areas of discoloration  · Neurologic  o Mental Status Examination  o : judgement, insight intact, modd and affect appropriate  o Motor Examination  o : strength grossly intact in all four extremities  o Gait and Station  o : normal gait, able to stand without difficulty          Results  · In-Office Procedures  o Lab procedure  § IOP - Urine Drug Screen In-House [facility] (84576)   § Amphetamines Ur Ql: Negative   § Barbiturates Ur Ql: Negative   § Buprenorphine+Nor Ur Ql Scn: Negative   § Benzodiaz Ur Ql: Negative   § Cocaine Ur Ql: Negative   § Methadone Ur Ql: Negative   § Methamphet Ur Ql: Negative   § MDMA Ur Ql Scn: Negative   § Opiates Ur Ql: Negative   § Oxycodone Ur Ql: Negative   § PCP Ur Ql: Negative   § THC Ur Ql: Negative   § Temp in Range?: Within/Acceptable   § Control Seen?: Yes       Assessment  · Diabetes mellitus, type 2     250.00/E11.9  · Restless leg     333.94/G25.81  · Muscle cramps     729.82/R25.2      Plan  · Orders  o Diabetes 2 Panel (Urine Microalbumin, CMP, Lipid, A1c, ) [facility] (73917, 85033, 13776, 36614) - 250.00/E11.9 - 10/02/2020  o CMP [facility] (26720) - 729.82/R25.2 - 10/02/2020  o JENIFFER Report (KASPR) - - 10/02/2020  o ACO-39:  Current medications updated and reviewed (1159F, ) - - 10/02/2020  o ACO-14: Influenza immunization was not administered for reasons documented [facility] () - - 10/02/2020  · Medications  o ropinirole 0.25 mg oral tablet   SIG: take 1 tablet (0.25 mg) by oral route 1-3 hours before bedtime for 30 days   DISP: (30) Tablet with 0 refills  Prescribed on 10/02/2020     o Medications have been Reconciled  o Transition of Care or Provider Policy  · Instructions  o Patient was educated/instructed on their diagnosis, treatment and medications prior to discharge from the clinic today.  o Minutes spent with patient including greater than 50% in Education/Counseling/Care Coordination.  o Time spent with the patient was minutes, more than 50% face to face.  · Disposition  o Call or Return if symptoms worsen or persist.  o F/u as scheduled.            Electronically Signed by: LORENA Olivares -Author on October 2, 2020 03:38:37 PM

## 2021-05-13 NOTE — PROGRESS NOTES
Progress Note      Patient Name: Idris Park   Patient ID: [id number]   Sex: Male   YOB: 1960    Primary Care Provider: Dae CARRILLO   Referring Provider: Dae CARRILLO    Visit Date: June 12, 2020    Provider: LORENA Olivares   Location: [facility]   Location Address: [address] [place] [address], [address] [place] [address]  [id number]   Location Phone: [phone]          Chief Complaint     The patient is here for a six month f/u of dm, htn, neuropathy, hyperlipidemia.  He has some skin tags that are  bothering him.       History Of Present Illness  Idris Park is a 60 year old /White male who presents for evaluation and treatment of:      Doing well.    Neuropathy:  doing well on medication.    Arthritis:  doing well on medication.  Works well    Hyperlipidemia:  Crestor doing well not having side effects like with other statin.    diabetes:  doing well.  Doesn't check blood sugars although does have equipment.    Skin tags under his axillae.  States that they can catch onhis clothing and pull causing pain.       Past Medical History  Disease Name Date Onset Notes   Arthritis --  --    Diabetes --  --    Limb Pain --  --    Limb Swelling --  --    Screening for colon cancer 11/23/17 Cologuard         Past Surgical History  Procedure Name Date Notes   Artificial Joints/Limbs --  --    Hernia 1967 & 2011 --    Joint Surgery --  --          Medication List  Name Date Started Instructions   Crestor 5 mg oral tablet 05/15/2020 take 1 tablet (5 mg) by oral route once daily at bedtime for 90 days   cyclobenzaprine 10 mg oral tablet 04/14/2020 take 1 tablet (10 mg) by oral route 3 times per day for 30 days   etodolac 300 mg oral capsule 03/06/2020 TAKE 1 CAPSULE BY MOUTH TWICE DAILY WITH FOOD   Fish Oil 340-1,000 mg oral capsule  take 1 capsule by oral route daily for 30 days   Januvia 50 mg oral tablet 10/04/2019 TAKE 1 TABLET BY MOUTH ONCE DAILY   Lasix 20 mg oral tablet  "05/11/2020 TAKE 1 TABLET BY MOUTH ONCE DAILY   Neurontin 300 mg oral capsule 03/30/2020 TAKE 1 CAPSULE BY ORAL ROUTE 3 TIMES PER DAY   prednisone 20 mg oral tablet 04/19/2020 take 2 tablets (40 mg) by oral route once daily for 4 days   tramadol 50 mg oral tablet 05/11/2020 take 1 tablet (50 mg) by oral route every 6 hours as needed for 30 days         Allergy List  Allergen Name Date Reaction Notes   Keflex --  rash --          Family Medical History  Disease Name Relative/Age Notes   Cancer, Unspecified Mother/   Mother   Renal Calculus Father/   Father   Family history of breast cancer Mother/50s   Mother/50s         Social History  Finding Status Start/Stop Quantity Notes   Alcohol Current some day 0/0 --  drinks rarely; wine and beer   Alcohol Use Current some day --/-- --  rarely drinks   Caffeine Current every day 0/0 --  drinks regularly; coffee, tea and soft drinks; 3-4 times per day   lives with spouse --  --/-- --  --    . --  --/-- --  --    Recreational Drug Use Never --/-- --  no   Second hand smoke exposure Never 0/0 --  no   Tobacco Never --/-- --  never smoker  never smoker  never a smoker   Working --  --/-- --  --          Immunizations  NameDate Admin Mfg Trade Name Lot Number Route Inj VIS Given VIS Publication   InfluenzaRefused 06/11/2019 NE Not Entered  NE NE     Comments:          Review of Systems  · Constitutional  o Denies  o : fever, fatigue, weight loss, weight gain  · Cardiovascular  o Denies  o : lower extremity edema, claudication, chest pressure, palpitations  · Respiratory  o Denies  o : shortness of breath, wheezing, cough, hemoptysis, dyspnea on exertion  · Gastrointestinal  o Denies  o : nausea, vomiting, diarrhea, constipation, abdominal pain  · Integument  o Admits  o : new skin lesions      Vitals  Date Time BP Position Site L\R Cuff Size HR RR TEMP (F) WT  HT  BMI kg/m2 BSA m2 O2 Sat        06/12/2020 08:31 /70 Sitting    73 - R 16 97 250lbs 0oz 5'  10\" 35.87 " 2.37 97 %          Physical Examination  · Constitutional  o Appearance  o : well-nourished, well developed, alert, in no acute distress  · Eyes  o Conjunctivae  o : conjunctivae normal  o Sclerae  o : sclerae white  o Pupils and Irises  o : pupils equal, round, and reactive to light and accommodation bilaterally  o Corneas  o : tear film normal, no lesions present  o Eyelids/Ocular Adnexae  o : eyelid appearance normal, no exudates present, eye moisture level normal  · Respiratory  o Respiratory Effort  o : breathing unlabored  o Inspection of Chest  o : normal appearance, no retractions  o Auscultation of Lungs  o : normal breath sounds throughout  · Cardiovascular  o Heart  o :   § Auscultation of Heart  § : regular rate and rhythm without murmur, PMI normal  o Peripheral Vascular System  o :   § Pedal Pulses  § : pulses 2 bilaterally  § Extremities  § : no cyanosis, clubbing or edema; less than 2 second refill noted  · Musculoskeletal  o General  o : No joint swelling or deformity noted. Muscle tone, strength and development grossly normal.  · Skin and Subcutaneous Tissue  o General Inspection  o : no rashes or lesions present, no areas of discoloration large skin tags under both axillae.  · Neurologic  o Mental Status Examination  o : judgement, insight intact, modd and affect appropriate  o Motor Examination  o : strength grossly intact in all four extremities  o Gait and Station  o : normal gait, able to stand without difficulty          Assessment  · Diabetes mellitus, type 2     250.00/E11.9  · Hyperlipidemia     272.4/E78.5  · Osteoarthritis     715.90/M19.90  · Neuropathy     355.9/G62.9  · Skin tags, multiple acquired     701.9/L91.8       Procedure:  consent signed.  Betadine placed on skin tags.  Using ethyl alcohol to freeze area 11 blade was used to remove tag at the base.  4 under left axillae and 3 under right axillae.  Silver nitrate and pressure dressing applied.       Plan  · Orders  o Diabetes  2 Panel (Urine Microalbumin, CMP, Lipid, A1c, ) [facility] (51890, 98946, 34411, [address]305) - 250.00/E11.9 - 12/12/2020  o Urinalysis with Reflex Microscopy if abnormal ([facility]) (40676) - 250.00/E11.9 - 12/12/2020  o ACO-39: Current medications updated and reviewed () - - 06/12/2020  o ACO-14: Influenza immunization was not administered for reasons documented () - - 06/12/2020  o Skin tag removal, any area, up to and including 15 lesions [facility] ([id number]) - 701.9/L91.8 - 06/12/2020   7 removed  · Medications  o Medications have been Reconciled  o Transition of Care or Provider Policy  · Instructions  o Advised that cheeses and other sources of dairy fats, animal fats, fast food, and the extras (candy, pastries, pies, doughnuts and cookies) all contain LDL raising nutrients. Advised to increase fruits, vegetables, whole grains, and to monitor portion sizes.   o Patient was educated/instructed on their diagnosis, treatment and medications prior to discharge from the clinic today.  o Minutes spent with patient including greater than 50% in Education/Counseling/Care Coordination.  o Time spent with the patient was minutes, more than 50% face to face.  · Disposition  o Return in 6 months            Electronically Signed by: LORENA Olivares -Author on June 12, 2020 09:20:18 AM

## 2021-05-14 VITALS — BODY MASS INDEX: 38.21 KG/M2 | HEIGHT: 69 IN | WEIGHT: 258 LBS

## 2021-05-14 VITALS
BODY MASS INDEX: 34.93 KG/M2 | SYSTOLIC BLOOD PRESSURE: 122 MMHG | WEIGHT: 244 LBS | OXYGEN SATURATION: 94 % | HEART RATE: 57 BPM | RESPIRATION RATE: 18 BRPM | DIASTOLIC BLOOD PRESSURE: 70 MMHG | HEIGHT: 70 IN | TEMPERATURE: 97.2 F

## 2021-05-14 VITALS
OXYGEN SATURATION: 97 % | BODY MASS INDEX: 35.07 KG/M2 | HEART RATE: 74 BPM | TEMPERATURE: 97.3 F | WEIGHT: 245 LBS | HEIGHT: 70 IN | SYSTOLIC BLOOD PRESSURE: 130 MMHG | RESPIRATION RATE: 16 BRPM | DIASTOLIC BLOOD PRESSURE: 76 MMHG

## 2021-05-14 VITALS
WEIGHT: 244 LBS | HEART RATE: 63 BPM | TEMPERATURE: 96.8 F | HEIGHT: 70 IN | RESPIRATION RATE: 16 BRPM | BODY MASS INDEX: 34.93 KG/M2 | SYSTOLIC BLOOD PRESSURE: 100 MMHG | OXYGEN SATURATION: 95 % | DIASTOLIC BLOOD PRESSURE: 70 MMHG

## 2021-05-14 NOTE — PROGRESS NOTES
Progress Note      Patient Name: Idris Park   Patient ID: [id number]   Sex: Male   YOB: 1960    Primary Care Provider: Dae CARRILLO   Referring Provider: Dae CARRILLO    Visit Date: March 18, 2021    Provider: LORENA Olivares   Location: [facility]   Location Address: [address] [place] [address], [address] [place] [address]  [id number]   Location Phone: [phone]          Chief Complaint     right toe pain over last 3 days       History Of Present Illness  Idris Park is a 60 year old /White male who presents for evaluation and treatment of:      great toe on right will get pains in and moreso in  cold.  Every day.   2-3 toes on right foot swollen and red.  Denies pain or itching.  Denies recent break in skin.    Edema: doing well on Lasix.    Arthralgia and neuropathy:  doing well on medication.  gabapentin working well.  Uses massaging has helped pain.    Diabetes:  well controlled on medication 6.0%    RLS:  doing well with ropinirole.  Able to sleep without issues.               Past Medical History  Disease Name Date Onset Notes   ***No Significant Medical History --  --    Arthritis --  --    Diabetes --  --    Limb Pain --  --    Limb Swelling --  --    Screening for colon cancer 11/23/17 Cologuard         Past Surgical History  Procedure Name Date Notes   Artificial Joints/Limbs --  --    Hernia 1967 & 2011 --    Joint Surgery --  --          Medication List  Name Date Started Instructions   Co Q-10 300 mg oral capsule  take 1 capsule by oral route daily   etodolac 300 mg oral capsule 01/13/2021 TAKE 1 CAPSULE BY MOUTH TWICE DAILY WITH FOOD   Fish Oil 340-1,000 mg oral capsule  take 1 capsule by oral route daily for 30 days   Januvia 50 mg oral tablet 07/31/2020 TAKE 1 TABLET BY MOUTH ONCE DAILY   Lasix 40 mg oral tablet 03/18/2021 take 1 tablet (40 mg) by oral route once daily for 90 days   Neurontin 300 mg oral capsule 03/30/2020 TAKE 1  CAPSULE BY ORAL ROUTE 3 TIMES PER DAY   ropinirole 0.5 mg oral tablet 03/18/2021 take 1 tablet (0.5 mg) by oral route 1-3 hours before bedtime for 90 days   rosuvastatin 5 mg oral tablet 12/09/2020 take 1 tablet (5 mg) by oral route once daily for 90 days   tramadol 50 mg oral tablet 01/07/2021 take 1 tablet (50 mg) by oral route every 6 hours as needed for 30 days         Allergy List  Allergen Name Date Reaction Notes   Keflex --  rash --          Family Medical History  Disease Name Relative/Age Notes   Cancer, Unspecified Mother/   Mother   Renal Calculus Father/   Father   Family history of breast cancer Mother/50s   Mother/50s         Social History  Finding Status Start/Stop Quantity Notes   Alcohol Current some day 0/0 --  drinks rarely; wine and beer   Alcohol Use Current some day --/-- --  rarely drinks   Caffeine Current every day 0/0 --  drinks regularly; coffee, tea and soft drinks; 3-4 times per day   lives with spouse --  --/-- --  --    . --  --/-- --  --    Recreational Drug Use Never --/-- --  no   Second hand smoke exposure Never 0/0 --  no   Tobacco Never --/-- --  never smoker  never smoker  never smoker  never a smoker   Working --  --/-- --  --          Immunizations  NameDate Admin Mfg Trade Name Lot Number Route Inj VIS Given VIS Publication   InfluenzaRefused 06/11/2019 NE Not Entered  NE NE     Comments:          Review of Systems  · Constitutional  o Denies  o : fever, fatigue, weight loss, weight gain  · Cardiovascular  o Denies  o : lower extremity edema, claudication, chest pressure, palpitations  · Respiratory  o Denies  o : shortness of breath, wheezing, cough, hemoptysis, dyspnea on exertion  · Gastrointestinal  o Denies  o : nausea, vomiting, diarrhea, constipation, abdominal pain  · Musculoskeletal  o Admits  o : foot pain      Vitals  Date Time BP Position Site L\R Cuff Size HR RR TEMP (F) WT  HT  BMI kg/m2 BSA m2 O2 Sat FR L/min FiO2 HC       03/18/2021 04:35 /70  "Sitting    63 - R 16 96.8 243lbs 16oz 5'  10\" 35.01 2.34 95 %  21%          Physical Examination  · Constitutional  o Appearance  o : well-nourished, well developed, alert, in no acute distress  · Eyes  o Conjunctivae  o : conjunctivae normal  o Sclerae  o : sclerae white  o Pupils and Irises  o : pupils equal, round, and reactive to light and accommodation bilaterally  o Corneas  o : tear film normal, no lesions present  o Eyelids/Ocular Adnexae  o : eyelid appearance normal, no exudates present, eye moisture level normal  · Respiratory  o Respiratory Effort  o : breathing unlabored  o Inspection of Chest  o : normal appearance, no retractions  o Auscultation of Lungs  o : normal breath sounds throughout  · Cardiovascular  o Heart  o :   § Auscultation of Heart  § : regular rate and rhythm without murmur, PMI normal  o Peripheral Vascular System  o :   § Pedal Pulses  § : pulses 2 bilaterally  § Extremities  § : no cyanosis, clubbing or edema; less than 2 second refill noted  · Musculoskeletal  o General  o : No joint swelling or deformity noted. Muscle tone, strength and development grossly normal.  · Skin and Subcutaneous Tissue  o General Inspection  o : no rashes or lesions present, no areas of discoloration 2-3 toes are red and warm to touch  · Neurologic  o Mental Status Examination  o : judgement, insight intact, modd and affect appropriate  o Motor Examination  o : strength grossly intact in all four extremities  o Gait and Station  o : normal gait, able to stand without difficulty              Assessment  · Diabetes mellitus, type 2     250.00/E11.9  · Osteoarthritis     715.90/M19.90  · Edema     782.3/R60.9  · Neuropathy     355.9/G62.9  · Cellulitis     682.9/L03.90  · Foot swelling     729.81/M79.89      Plan  · Orders  o Diabetes 2 Panel (Urine Microalbumin, CMP, Lipid, A1c, ) [facility] (72976, 49250, 02624, 87277) - 250.00/E11.9 - 09/18/2021  o Diabetic Foot (Motor and Sensory) Exam Completed [facility] (, " , 2028F) - 250.00/E11.9 - 03/18/2021  o ACO-39: Current medications updated and reviewed (, 1159F) - - 03/18/2021  o Uric Acid Serum [facility] (88431) - 729.81/M79.89 - 03/18/2021  · Medications  o Bactrim -160 mg oral tablet   SIG: take 1 tablet by oral route every 12 hours for 10 days   DISP: (20) Tablet with 0 refills  Prescribed on 03/18/2021     o Lasix 40 mg oral tablet   SIG: take 1 tablet (40 mg) by oral route once daily for 90 days   DISP: (90) Tablet with 1 refills  Refilled on 03/18/2021     o ropinirole 0.5 mg oral tablet   SIG: take 1 tablet (0.5 mg) by oral route 1-3 hours before bedtime for 90 days   DISP: (90) Tablet with 1 refills  Refilled on 03/18/2021     o Medications have been Reconciled  o Transition of Care or Provider Policy  · Instructions  o Patient was educated/instructed on their diagnosis, treatment and medications prior to discharge from the clinic today.  o Minutes spent with patient including greater than 50% in Education/Counseling/Care Coordination.  o Time spent with the patient was minutes, more than 50% face to face.  · Disposition  o Return in 6 months            Electronically Signed by: LORENA Olivares -Author on March 18, 2021 04:57:19 PM

## 2021-05-14 NOTE — PROGRESS NOTES
Progress Note      Patient Name: Idris Park   Patient ID: [id number]   Sex: Male   YOB: 1960    Primary Care Provider: Dae CARRILLO   Referring Provider: Dae CARRILLO    Visit Date: December 18, 2020    Provider: LORENA Olivares   Location: [facility]   Location Address: [address] [place] [address], [address] [place] [address]  [id number]   Location Phone: [phone]          Chief Complaint     The patient is here for a f/u of dm, hyperlipidemia, neuropathy, osteoarthritis.       History Of Present Illness  Idris Park is a 60 year old /White male who presents for evaluation and treatment of:      Muscle cramps: Patient has been taking over-the-counter potassium magnesium and CoQ10 and his cramps are almost completely gone.  Does have some loose stools due to he thinks the magnesium but nothing he can handle.    Neuropathy: Stable.  Not having any worsening.    Restless leg syndrome.  States that his wife is an alert complaining so he thinks the medication is working well at its dose.    Has been having some back pain but he thinks it is time for new boots.  Notes usually after he has been leaning over or sitting down and then standing back up.  And occurs in his low back.    Diabetes: Doing well.  He is given up chocolate more for his arthralgias.       Past Medical History  Disease Name Date Onset Notes   ***No Significant Medical History --  --    Arthritis --  --    Diabetes --  --    Limb Pain --  --    Limb Swelling --  --    Screening for colon cancer 11/23/17 Noelle         Past Surgical History  Procedure Name Date Notes   Artificial Joints/Limbs --  --    Hernia 1967 & 2011 --    Joint Surgery --  --          Medication List  Name Date Started Instructions   Co Q-10 300 mg oral capsule  take 1 capsule by oral route daily   etodolac 300 mg oral capsule 08/06/2020 TAKE 1 CAPSULE BY MOUTH TWICE DAILY WITH FOOD   Fish Oil 340-1,000 mg oral  capsule  take 1 capsule by oral route daily for 30 days   Januvia 50 mg oral tablet 07/31/2020 TAKE 1 TABLET BY MOUTH ONCE DAILY   Lasix 40 mg oral tablet 07/13/2020 take 1 tablet (40 mg) by oral route once daily for 90 days   Neurontin 300 mg oral capsule 03/30/2020 TAKE 1 CAPSULE BY ORAL ROUTE 3 TIMES PER DAY   ropinirole 0.5 mg oral tablet 10/21/2020 take 1 tablet (0.5 mg) by oral route 1-3 hours before bedtime for 30 days   rosuvastatin 5 mg oral tablet 12/09/2020 take 1 tablet (5 mg) by oral route once daily for 90 days   tramadol 50 mg oral tablet 11/05/2020 take 1 tablet (50 mg) by oral route every 6 hours as needed for 30 days         Allergy List  Allergen Name Date Reaction Notes   Keflex --  rash --        Allergies Reconciled  Family Medical History  Disease Name Relative/Age Notes   Cancer, Unspecified Mother/   Mother   Renal Calculus Father/   Father   Family history of breast cancer Mother/50s   Mother/50s         Social History  Finding Status Start/Stop Quantity Notes   Alcohol Current some day 0/0 --  drinks rarely; wine and beer   Alcohol Use Current some day --/-- --  rarely drinks   Caffeine Current every day 0/0 --  drinks regularly; coffee, tea and soft drinks; 3-4 times per day   lives with spouse --  --/-- --  --    . --  --/-- --  --    Recreational Drug Use Never --/-- --  no   Second hand smoke exposure Never 0/0 --  no   Tobacco Never --/-- --  never smoker  never smoker  never smoker  never a smoker   Working --  --/-- --  --          Immunizations  NameDate Admin Mfg Trade Name Lot Number Route Inj VIS Given VIS Publication   InfluenzaRefused 06/11/2019 NE Not Entered  NE NE     Comments:          Review of Systems  · Constitutional  o Denies  o : fever, fatigue, weight loss, weight gain  · Cardiovascular  o Denies  o : lower extremity edema, claudication, chest pressure, palpitations  · Respiratory  o Denies  o : shortness of breath, wheezing, cough, hemoptysis, dyspnea on  "exertion  · Gastrointestinal  o Denies  o : nausea, vomiting, diarrhea, constipation, abdominal pain      Vitals  Date Time BP Position Site L\R Cuff Size HR RR TEMP (F) WT  HT  BMI kg/m2 BSA m2 O2 Sat FR L/min FiO2 HC       12/18/2020 08:00 /76 Sitting    74 - R 16 97.3 245lbs 0oz 5'  10\" 35.15 2.34 97 %  21%          Physical Examination  · Constitutional  o Appearance  o : well-nourished, well developed, alert, in no acute distress  · Eyes  o Conjunctivae  o : conjunctivae normal  o Sclerae  o : sclerae white  o Pupils and Irises  o : pupils equal, round, and reactive to light and accommodation bilaterally  o Corneas  o : tear film normal, no lesions present  o Eyelids/Ocular Adnexae  o : eyelid appearance normal, no exudates present, eye moisture level normal  · Respiratory  o Respiratory Effort  o : breathing unlabored  o Inspection of Chest  o : normal appearance, no retractions  o Auscultation of Lungs  o : normal breath sounds throughout  · Cardiovascular  o Heart  o :   § Auscultation of Heart  § : regular rate and rhythm without murmur, PMI normal  o Peripheral Vascular System  o :   § Extremities  § : no cyanosis, clubbing or edema; less than 2 second refill noted  · Musculoskeletal  o General  o : No joint swelling or deformity noted. Muscle tone, strength and development grossly normal.  · Skin and Subcutaneous Tissue  o General Inspection  o : no rashes or lesions present, no areas of discoloration  · Neurologic  o Mental Status Examination  o : judgement, insight intact, modd and affect appropriate  o Motor Examination  o : strength grossly intact in all four extremities  o Gait and Station  o : normal gait, able to stand without difficulty          Assessment  · Diabetes mellitus, type 2     250.00/E11.9  · Osteoarthritis     715.90/M19.90  · Neuropathy     355.9/G62.9  · RLS (restless legs syndrome)     333.94/G25.81  · Muscle cramps     729.82/R25.2      Plan  · Orders  o ACO-39: Current " medications updated and reviewed (1159F, ) - - 12/18/2020  · Medications  o Medications have been Reconciled  o Transition of Care or Provider Policy  · Instructions  o Patient was educated/instructed on their diagnosis, treatment and medications prior to discharge from the clinic today.            Electronically Signed by: LORENA Olivares -Author on December 18, 2020 08:35:39 AM

## 2021-05-15 VITALS
HEIGHT: 70 IN | WEIGHT: 251.31 LBS | BODY MASS INDEX: 35.98 KG/M2 | TEMPERATURE: 97.8 F | HEART RATE: 78 BPM | SYSTOLIC BLOOD PRESSURE: 110 MMHG | RESPIRATION RATE: 16 BRPM | OXYGEN SATURATION: 96 % | DIASTOLIC BLOOD PRESSURE: 76 MMHG

## 2021-05-15 VITALS
HEIGHT: 70 IN | OXYGEN SATURATION: 97 % | RESPIRATION RATE: 16 BRPM | WEIGHT: 249 LBS | DIASTOLIC BLOOD PRESSURE: 58 MMHG | HEART RATE: 63 BPM | BODY MASS INDEX: 35.65 KG/M2 | TEMPERATURE: 97.2 F | SYSTOLIC BLOOD PRESSURE: 104 MMHG

## 2021-05-15 VITALS
DIASTOLIC BLOOD PRESSURE: 68 MMHG | SYSTOLIC BLOOD PRESSURE: 118 MMHG | RESPIRATION RATE: 16 BRPM | TEMPERATURE: 97 F | BODY MASS INDEX: 35.07 KG/M2 | OXYGEN SATURATION: 97 % | HEART RATE: 68 BPM | HEIGHT: 70 IN | WEIGHT: 245 LBS

## 2021-05-15 VITALS — HEIGHT: 70 IN | OXYGEN SATURATION: 96 % | WEIGHT: 255.12 LBS | BODY MASS INDEX: 36.52 KG/M2 | HEART RATE: 91 BPM

## 2021-05-15 VITALS
TEMPERATURE: 97.6 F | BODY MASS INDEX: 34.93 KG/M2 | HEIGHT: 70 IN | SYSTOLIC BLOOD PRESSURE: 125 MMHG | WEIGHT: 244 LBS | OXYGEN SATURATION: 98 % | RESPIRATION RATE: 16 BRPM | HEART RATE: 64 BPM | DIASTOLIC BLOOD PRESSURE: 68 MMHG

## 2021-05-15 VITALS
TEMPERATURE: 96.5 F | BODY MASS INDEX: 35.93 KG/M2 | RESPIRATION RATE: 16 BRPM | SYSTOLIC BLOOD PRESSURE: 116 MMHG | OXYGEN SATURATION: 95 % | DIASTOLIC BLOOD PRESSURE: 72 MMHG | HEART RATE: 63 BPM | HEIGHT: 70 IN | WEIGHT: 251 LBS

## 2021-05-15 VITALS
RESPIRATION RATE: 16 BRPM | HEART RATE: 73 BPM | BODY MASS INDEX: 35.79 KG/M2 | WEIGHT: 250 LBS | HEIGHT: 70 IN | SYSTOLIC BLOOD PRESSURE: 102 MMHG | OXYGEN SATURATION: 97 % | DIASTOLIC BLOOD PRESSURE: 70 MMHG | TEMPERATURE: 97 F

## 2021-05-15 VITALS
SYSTOLIC BLOOD PRESSURE: 108 MMHG | WEIGHT: 248 LBS | HEART RATE: 55 BPM | HEIGHT: 70 IN | TEMPERATURE: 97.6 F | RESPIRATION RATE: 18 BRPM | OXYGEN SATURATION: 95 % | BODY MASS INDEX: 35.5 KG/M2 | DIASTOLIC BLOOD PRESSURE: 68 MMHG

## 2021-05-16 VITALS
DIASTOLIC BLOOD PRESSURE: 74 MMHG | TEMPERATURE: 97 F | SYSTOLIC BLOOD PRESSURE: 121 MMHG | RESPIRATION RATE: 16 BRPM | HEIGHT: 70 IN | OXYGEN SATURATION: 97 % | HEART RATE: 77 BPM | BODY MASS INDEX: 37.08 KG/M2 | WEIGHT: 259 LBS

## 2021-05-16 VITALS
HEIGHT: 70 IN | BODY MASS INDEX: 36.51 KG/M2 | TEMPERATURE: 98.2 F | WEIGHT: 255 LBS | DIASTOLIC BLOOD PRESSURE: 74 MMHG | OXYGEN SATURATION: 98 % | SYSTOLIC BLOOD PRESSURE: 117 MMHG | HEART RATE: 70 BPM | RESPIRATION RATE: 16 BRPM

## 2021-05-16 VITALS
RESPIRATION RATE: 16 BRPM | SYSTOLIC BLOOD PRESSURE: 121 MMHG | WEIGHT: 259 LBS | HEART RATE: 76 BPM | DIASTOLIC BLOOD PRESSURE: 78 MMHG | OXYGEN SATURATION: 96 % | TEMPERATURE: 96.8 F | BODY MASS INDEX: 37.08 KG/M2 | HEIGHT: 70 IN

## 2021-05-16 VITALS
RESPIRATION RATE: 18 BRPM | OXYGEN SATURATION: 100 % | BODY MASS INDEX: 35.5 KG/M2 | HEART RATE: 66 BPM | TEMPERATURE: 98.2 F | HEIGHT: 70 IN | WEIGHT: 248 LBS | SYSTOLIC BLOOD PRESSURE: 133 MMHG | DIASTOLIC BLOOD PRESSURE: 62 MMHG

## 2021-05-16 VITALS
TEMPERATURE: 97 F | WEIGHT: 254 LBS | HEIGHT: 70 IN | DIASTOLIC BLOOD PRESSURE: 71 MMHG | OXYGEN SATURATION: 98 % | HEART RATE: 69 BPM | SYSTOLIC BLOOD PRESSURE: 117 MMHG | BODY MASS INDEX: 36.36 KG/M2

## 2021-05-16 VITALS
OXYGEN SATURATION: 98 % | RESPIRATION RATE: 25 BRPM | WEIGHT: 263.5 LBS | BODY MASS INDEX: 37.72 KG/M2 | TEMPERATURE: 97.6 F | DIASTOLIC BLOOD PRESSURE: 59 MMHG | HEART RATE: 66 BPM | SYSTOLIC BLOOD PRESSURE: 113 MMHG | HEIGHT: 70 IN

## 2021-06-22 RX ORDER — GABAPENTIN 300 MG/1
CAPSULE ORAL
Qty: 270 CAPSULE | Refills: 0 | Status: SHIPPED | OUTPATIENT
Start: 2021-06-22 | End: 2021-10-18 | Stop reason: SDUPTHER

## 2021-06-30 DIAGNOSIS — R52 PAIN: Primary | ICD-10-CM

## 2021-06-30 RX ORDER — TRAMADOL HYDROCHLORIDE 50 MG/1
TABLET ORAL
Qty: 120 TABLET | Refills: 1 | Status: SHIPPED | OUTPATIENT
Start: 2021-06-30 | End: 2021-11-04 | Stop reason: SDUPTHER

## 2021-08-15 ENCOUNTER — APPOINTMENT (OUTPATIENT)
Dept: GENERAL RADIOLOGY | Facility: HOSPITAL | Age: 61
End: 2021-08-15

## 2021-08-15 ENCOUNTER — HOSPITAL ENCOUNTER (EMERGENCY)
Facility: HOSPITAL | Age: 61
Discharge: HOME | End: 2021-08-15
Attending: EMERGENCY MEDICINE | Admitting: EMERGENCY MEDICINE

## 2021-08-15 VITALS
RESPIRATION RATE: 18 BRPM | HEART RATE: 59 BPM | DIASTOLIC BLOOD PRESSURE: 74 MMHG | OXYGEN SATURATION: 98 % | SYSTOLIC BLOOD PRESSURE: 139 MMHG | HEIGHT: 69 IN | WEIGHT: 235.67 LBS | BODY MASS INDEX: 34.91 KG/M2 | TEMPERATURE: 98.1 F

## 2021-08-15 DIAGNOSIS — Z20.822 ENCOUNTER FOR LABORATORY TESTING FOR COVID-19 VIRUS: Primary | ICD-10-CM

## 2021-08-15 DIAGNOSIS — B34.9 VIRAL SYNDROME: ICD-10-CM

## 2021-08-15 LAB — SARS-COV-2 RNA RESP QL NAA+PROBE: DETECTED

## 2021-08-15 PROCEDURE — 99283 EMERGENCY DEPT VISIT LOW MDM: CPT

## 2021-08-15 PROCEDURE — 71045 X-RAY EXAM CHEST 1 VIEW: CPT

## 2021-08-15 PROCEDURE — C9803 HOPD COVID-19 SPEC COLLECT: HCPCS | Performed by: PHYSICIAN ASSISTANT

## 2021-08-15 PROCEDURE — U0003 INFECTIOUS AGENT DETECTION BY NUCLEIC ACID (DNA OR RNA); SEVERE ACUTE RESPIRATORY SYNDROME CORONAVIRUS 2 (SARS-COV-2) (CORONAVIRUS DISEASE [COVID-19]), AMPLIFIED PROBE TECHNIQUE, MAKING USE OF HIGH THROUGHPUT TECHNOLOGIES AS DESCRIBED BY CMS-2020-01-R: HCPCS | Performed by: PHYSICIAN ASSISTANT

## 2021-08-15 RX ORDER — FLUTICASONE PROPIONATE 50 MCG
2 SPRAY, SUSPENSION (ML) NASAL DAILY
Qty: 9.9 ML | Refills: 0 | Status: SHIPPED | OUTPATIENT
Start: 2021-08-15 | End: 2021-08-27

## 2021-08-15 RX ORDER — CETIRIZINE HYDROCHLORIDE 10 MG/1
10 TABLET ORAL DAILY
Qty: 30 TABLET | Refills: 0 | Status: SHIPPED | OUTPATIENT
Start: 2021-08-15 | End: 2021-09-23

## 2021-08-15 RX ORDER — AZITHROMYCIN 250 MG/1
TABLET, FILM COATED ORAL
Qty: 6 TABLET | Refills: 0 | Status: SHIPPED | OUTPATIENT
Start: 2021-08-15 | End: 2021-08-27

## 2021-08-15 RX ORDER — FUROSEMIDE 20 MG/1
20 TABLET ORAL 2 TIMES DAILY
COMMUNITY
End: 2021-08-27

## 2021-08-15 RX ORDER — ROSUVASTATIN CALCIUM 5 MG/1
5 TABLET, COATED ORAL DAILY
COMMUNITY
End: 2021-09-23 | Stop reason: SDUPTHER

## 2021-08-15 RX ORDER — UBIDECARENONE 100 MG
100 CAPSULE ORAL DAILY
COMMUNITY

## 2021-08-15 NOTE — ED PROVIDER NOTES
Subjective     History provided by:  Patient   used: No    URI  Presenting symptoms: congestion, cough and rhinorrhea    Presenting symptoms: no fatigue and no fever    Severity:  Moderate  Onset quality:  Gradual  Duration:  2 days  Timing:  Intermittent  Progression:  Worsening  Chronicity:  New      Review of Systems   Constitutional: Negative for appetite change, chills, fatigue and fever.   HENT: Positive for congestion and rhinorrhea.    Eyes: Negative.  Negative for photophobia.   Respiratory: Positive for cough.    Gastrointestinal: Negative.    Endocrine: Negative.    Genitourinary: Negative.    Musculoskeletal: Negative.    Skin: Negative.    Allergic/Immunologic: Negative.  Negative for immunocompromised state.   Neurological: Negative.    Hematological: Negative.    Psychiatric/Behavioral: Negative.    All other systems reviewed and are negative.      Past Medical History:   Diagnosis Date   • Arthritis    • Diabetes (CMS/HCC)    • Hyperlipidemia    • Limb pain    • Limb swelling        Allergies   Allergen Reactions   • Cephalexin Rash       Past Surgical History:   Procedure Laterality Date   • DENTAL PROCEDURE     • HERNIA REPAIR  1967    also 2011   • JOINT REPLACEMENT      Artificial Joints/Limbs   • OTHER SURGICAL HISTORY      JOINT SURGERY       Family History   Problem Relation Age of Onset   • Cancer Mother    • Breast cancer Mother 50   • Other Father         RENAL CALCULUS       Social History     Socioeconomic History   • Marital status:      Spouse name: Not on file   • Number of children: Not on file   • Years of education: Not on file   • Highest education level: Not on file   Tobacco Use   • Smoking status: Never Smoker   Substance and Sexual Activity   • Alcohol use: Not Currently     Comment: socially   • Drug use: Never           Objective   Physical Exam  Vitals and nursing note reviewed.   Constitutional:       General: He is not in acute distress.      Appearance: Normal appearance. He is not toxic-appearing.   HENT:      Head: Normocephalic and atraumatic.      Right Ear: Tympanic membrane normal.      Left Ear: Tympanic membrane normal.      Nose: Congestion and rhinorrhea present.      Mouth/Throat:      Mouth: Mucous membranes are moist.      Pharynx: Oropharynx is clear.   Eyes:      Extraocular Movements: Extraocular movements intact.      Pupils: Pupils are equal, round, and reactive to light.   Cardiovascular:      Rate and Rhythm: Normal rate and regular rhythm.      Pulses: Normal pulses.      Heart sounds: Normal heart sounds.   Pulmonary:      Effort: Pulmonary effort is normal. No respiratory distress.      Breath sounds: Normal breath sounds.   Musculoskeletal:         General: Normal range of motion.      Cervical back: Normal range of motion and neck supple.   Skin:     General: Skin is warm and dry.   Neurological:      Mental Status: He is alert and oriented to person, place, and time. Mental status is at baseline.             MDM  Number of Diagnoses or Management Options  Encounter for laboratory testing for COVID-19 virus  Viral syndrome  Diagnosis management comments: Pt is a 61 y.o. male that presents today with Patient presents with:  Cough  Nasal Congestion       Work up today:  Lab Results (last 24 hours)     ** No results found for the last 24 hours. **      XR Chest 1 View    Result Date: 8/15/2021  PROCEDURE: XR CHEST 1 VW  COMPARISON: [facility], CR, CHEST PA/AP & LAT 2V, 6/20/2019, 20:35.  INDICATIONS: cough  FINDINGS:  The lungs are adequately expanded. There is no focal consolidation, pneumothorax, or obvious pleural effusion. The pulmonary vasculature appears within normal limits. The cardiac and mediastinal contours are within normal limits. The osseous structures appear intact.  CONCLUSION:  No acute cardiopulmonary process.        DESTIN MORALES MD       Electronically Signed and Approved By: DESTIN MORALES MD on 8/15/2021 at 13:19               @No orders to display     Vitals WNL. He has been vaccinated for COVID. Will do symptomatic treatment.   Pt is otherwise non toxic and non ill appearing and stable for d/c home.  Pt is in agreement with this.  All questions answered at bedside.          Amount and/or Complexity of Data Reviewed  Tests in the radiology section of CPT®: reviewed    Risk of Complications, Morbidity, and/or Mortality  Presenting problems: low  Diagnostic procedures: low  Management options: low    Patient Progress  Patient progress: stable      Final diagnoses:   Encounter for laboratory testing for COVID-19 virus   Viral syndrome       ED Disposition  ED Disposition     ED Disposition Condition Comment    Discharge Stable           Dae Yan, APRN  [address] [facility] [address]  [facility] [address]  [phone]    Schedule an appointment as soon as possible for a visit in 1 week           Medication List      New Prescriptions    azithromycin 250 MG tablet  Commonly known as: ZITHROMAX  2 po day 1 then 1 po days 2-5     cetirizine 10 MG tablet  Commonly known as: zyrTEC  Take 1 tablet by mouth Daily.     fluticasone 50 MCG/ACT nasal spray  Commonly known as: FLONASE  2 sprays into the nostril(s) as directed by provider Daily.           Where to Get Your Medications      You can get these medications from any pharmacy    Bring a paper prescription for each of these medications  · azithromycin 250 MG tablet  · cetirizine 10 MG tablet  · fluticasone 50 MCG/ACT nasal spray          Ryland Florian PA  08/15/21 [id number]

## 2021-08-16 ENCOUNTER — TELEPHONE (OUTPATIENT)
Dept: EMERGENCY DEPT | Facility: HOSPITAL | Age: 61
End: 2021-08-16

## 2021-08-16 NOTE — TELEPHONE ENCOUNTER
Discussed COVID result, quarantine per CDC guidelines, drink plenty of fluids, return to ER precautions, symptomatic treatment, verbalized understanding.

## 2021-08-24 ENCOUNTER — TELEPHONE (OUTPATIENT)
Dept: FAMILY MEDICINE CLINIC | Facility: CLINIC | Age: 61
End: 2021-08-24

## 2021-08-24 NOTE — TELEPHONE ENCOUNTER
Idris states that he just came out of quarantine for COVID and that he has a fever of 100.2. He would like to be fit in for an appointment on 8/25 if possible.

## 2021-08-27 ENCOUNTER — OFFICE VISIT (OUTPATIENT)
Dept: FAMILY MEDICINE CLINIC | Facility: CLINIC | Age: 61
End: 2021-08-27

## 2021-08-27 VITALS
DIASTOLIC BLOOD PRESSURE: 72 MMHG | OXYGEN SATURATION: 97 % | RESPIRATION RATE: 16 BRPM | HEART RATE: 82 BPM | SYSTOLIC BLOOD PRESSURE: 124 MMHG | TEMPERATURE: 97.7 F

## 2021-08-27 DIAGNOSIS — R60.9 EDEMA, UNSPECIFIED TYPE: ICD-10-CM

## 2021-08-27 DIAGNOSIS — J02.9 ACUTE PHARYNGITIS, UNSPECIFIED ETIOLOGY: ICD-10-CM

## 2021-08-27 DIAGNOSIS — Z12.11 SCREEN FOR COLON CANCER: Primary | ICD-10-CM

## 2021-08-27 PROCEDURE — 99213 OFFICE O/P EST LOW 20 MIN: CPT | Performed by: NURSE PRACTITIONER

## 2021-08-27 RX ORDER — FUROSEMIDE 40 MG/1
40 TABLET ORAL DAILY
Qty: 90 TABLET | Refills: 1 | Status: SHIPPED | OUTPATIENT
Start: 2021-08-27 | End: 2021-09-23 | Stop reason: SDUPTHER

## 2021-08-27 RX ORDER — FUROSEMIDE 40 MG/1
40 TABLET ORAL DAILY
COMMUNITY
End: 2021-08-27 | Stop reason: SDUPTHER

## 2021-08-27 NOTE — PROGRESS NOTES
Chief Complaint  URI    Subjective        Idris Park presents to [facility]  Started on Zpack Monday for uri.  Had a fever at dentist office Tuesday this week and they wanted to make sure he was better from positive Covid before working on teeth.    URI   Pertinent negatives include no chest pain or coughing.         Past History:    Medical History: has a past medical history of Arthritis, COVID-19, Diabetes (CMS/HCC), Hyperlipidemia, Limb pain, and Limb swelling.     Surgical History: has a past surgical history that includes Joint replacement; Hernia repair (1967); Other surgical history; and Dental surgery.     Family History: family history includes Breast cancer (age of onset: 50) in his mother; Cancer in his mother; Other in his father.     Social History: reports that he has never smoked. He has never used smokeless tobacco. He reports previous alcohol use. He reports that he does not use drugs.    Allergies: Cephalexin          Current Outpatient Medications:   •  cetirizine (zyrTEC) 10 MG tablet, Take 1 tablet by mouth Daily., Disp: 30 tablet, Rfl: 0  •  coenzyme Q10 100 MG capsule, Take 100 mg by mouth Daily., Disp: , Rfl:   •  etodolac (LODINE) 300 MG capsule, TAKE 1 CAPSULE BY MOUTH TWICE DAILY WITH FOOD, Disp: 180 capsule, Rfl: 1  •  furosemide (LASIX) 40 MG tablet, Take 1 tablet by mouth Daily., Disp: 90 tablet, Rfl: 1  •  gabapentin (NEURONTIN) 300 MG capsule, TAKE 1 CAPSULE BY MOUTH THREE TIMES DAILY, Disp: 270 capsule, Rfl: 0  •  rosuvastatin (CRESTOR) 5 MG tablet, Take 5 mg by mouth Daily., Disp: , Rfl:   •  SITagliptin (JANUVIA) 50 MG tablet, Take 50 mg by mouth Daily., Disp: , Rfl:   •  traMADol (ULTRAM) 50 MG tablet, TAKE ONE TABLET BY MOUTH EVERY 6 HOURS AS NEEDED, Disp: 120 tablet, Rfl: 1    Medications Discontinued During This Encounter   Medication Reason   • fluticasone (FLONASE) 50 MCG/ACT nasal spray *Therapy completed   • azithromycin (ZITHROMAX) 250  MG tablet *Therapy completed   • furosemide (LASIX) 20 MG tablet *Therapy completed   • furosemide (LASIX) 40 MG tablet Reorder         Review of Systems   Constitutional: Negative for fever.   Respiratory: Negative for cough and shortness of breath.    Cardiovascular: Negative for chest pain, palpitations and leg swelling.   Neurological: Negative for dizziness, weakness, numbness and headache.        Objective         Vitals:    08/27/21 1436   BP: 124/72   BP Location: Right arm   Patient Position: Sitting   Cuff Size: Adult   Pulse: 82   Resp: 16   Temp: 97.7 °F (36.5 °C)   TempSrc: Infrared   SpO2: 97%     There is no height or weight on file to calculate BMI.         Physical Exam  Vitals reviewed.   Constitutional:       Appearance: Normal appearance. He is well-developed.   HENT:      Head: Normocephalic and atraumatic.      Right Ear: Tympanic membrane is injected.      Left Ear: Tympanic membrane normal.      Mouth/Throat:      Pharynx: No oropharyngeal exudate.   Eyes:      Conjunctiva/sclera: Conjunctivae normal.      Pupils: Pupils are equal, round, and reactive to light.   Cardiovascular:      Rate and Rhythm: Normal rate and regular rhythm.      Heart sounds: No murmur heard.   No friction rub. No gallop.    Pulmonary:      Effort: Pulmonary effort is normal.      Breath sounds: Normal breath sounds. No wheezing or rhonchi.   Skin:     General: Skin is warm and dry.   Neurological:      Mental Status: He is alert and oriented to person, place, and time.   Psychiatric:         Mood and Affect: Mood and affect normal.         Behavior: Behavior normal.         Thought Content: Thought content normal.         Judgment: Judgment normal.             Result Review :               Assessment and Plan     Diagnoses and all orders for this visit:    1. Screen for colon cancer (Primary)  -     Cologuard - Stool, Per Rectum; Future    2. Acute pharyngitis, unspecified etiology    3. Edema, unspecified  type  Comments:  continue lasix at current dose.  Orders:  -     furosemide (LASIX) 40 MG tablet; Take 1 tablet by mouth Daily.  Dispense: 90 tablet; Refill: 1              Follow Up     Return if symptoms worsen or fail to improve, for Next scheduled follow up.    Patient was given instructions and counseling regarding his condition or for health maintenance advice. Please see specific information pulled into the AVS if appropriate.

## 2021-09-23 ENCOUNTER — LAB (OUTPATIENT)
Dept: LAB | Facility: HOSPITAL | Age: 61
End: 2021-09-23

## 2021-09-23 ENCOUNTER — OFFICE VISIT (OUTPATIENT)
Dept: FAMILY MEDICINE CLINIC | Facility: CLINIC | Age: 61
End: 2021-09-23

## 2021-09-23 VITALS
RESPIRATION RATE: 16 BRPM | DIASTOLIC BLOOD PRESSURE: 72 MMHG | SYSTOLIC BLOOD PRESSURE: 106 MMHG | TEMPERATURE: 97.5 F | OXYGEN SATURATION: 96 % | HEART RATE: 70 BPM

## 2021-09-23 DIAGNOSIS — U07.1 COVID-19: ICD-10-CM

## 2021-09-23 DIAGNOSIS — E11.65 TYPE 2 DIABETES MELLITUS WITH HYPERGLYCEMIA, WITHOUT LONG-TERM CURRENT USE OF INSULIN (HCC): ICD-10-CM

## 2021-09-23 DIAGNOSIS — R60.9 EDEMA, UNSPECIFIED TYPE: ICD-10-CM

## 2021-09-23 DIAGNOSIS — Z12.5 PROSTATE CANCER SCREENING: ICD-10-CM

## 2021-09-23 DIAGNOSIS — U07.1 COVID-19: Primary | ICD-10-CM

## 2021-09-23 DIAGNOSIS — E78.5 HYPERLIPIDEMIA, UNSPECIFIED HYPERLIPIDEMIA TYPE: ICD-10-CM

## 2021-09-23 LAB
ALBUMIN SERPL-MCNC: 4.6 G/DL (ref 3.5–5.2)
ALBUMIN UR-MCNC: <1.2 MG/DL
ALBUMIN/GLOB SERPL: 1.7 G/DL
ALP SERPL-CCNC: 92 U/L (ref 39–117)
ALT SERPL W P-5'-P-CCNC: 18 U/L (ref 1–41)
ANION GAP SERPL CALCULATED.3IONS-SCNC: 10 MMOL/L (ref 5–15)
AST SERPL-CCNC: 24 U/L (ref 1–40)
BACTERIA UR QL AUTO: NORMAL /HPF
BILIRUB SERPL-MCNC: 0.5 MG/DL (ref 0–1.2)
BILIRUB UR QL STRIP: NEGATIVE
BUN SERPL-MCNC: 14 MG/DL (ref 8–23)
BUN/CREAT SERPL: 12.7 (ref 7–25)
CALCIUM SPEC-SCNC: 9.2 MG/DL (ref 8.6–10.5)
CHLORIDE SERPL-SCNC: 100 MMOL/L (ref 98–107)
CHOLEST SERPL-MCNC: 147 MG/DL (ref 0–200)
CLARITY UR: CLEAR
CO2 SERPL-SCNC: 30 MMOL/L (ref 22–29)
COLOR UR: ABNORMAL
CREAT SERPL-MCNC: 1.1 MG/DL (ref 0.76–1.27)
DEPRECATED RDW RBC AUTO: 42.7 FL (ref 37–54)
ERYTHROCYTE [DISTWIDTH] IN BLOOD BY AUTOMATED COUNT: 12.6 % (ref 12.3–15.4)
GFR SERPL CREATININE-BSD FRML MDRD: 68 ML/MIN/1.73
GLOBULIN UR ELPH-MCNC: 2.7 GM/DL
GLUCOSE SERPL-MCNC: 109 MG/DL (ref 65–99)
GLUCOSE UR STRIP-MCNC: NEGATIVE MG/DL
HBA1C MFR BLD: 5.7 % (ref 4.8–5.6)
HCT VFR BLD AUTO: 46 % (ref 37.5–51)
HDLC SERPL QL: 3.5
HDLC SERPL-MCNC: 42 MG/DL (ref 40–60)
HGB BLD-MCNC: 15.2 G/DL (ref 13–17.7)
HGB UR QL STRIP.AUTO: NEGATIVE
HYALINE CASTS UR QL AUTO: NORMAL /LPF
KETONES UR QL STRIP: ABNORMAL
LDLC SERPL CALC-MCNC: 80 MG/DL (ref 0–100)
LEUKOCYTE ESTERASE UR QL STRIP.AUTO: ABNORMAL
MCH RBC QN AUTO: 30.8 PG (ref 26.6–33)
MCHC RBC AUTO-ENTMCNC: 33 G/DL (ref 31.5–35.7)
MCV RBC AUTO: 93.1 FL (ref 79–97)
NITRITE UR QL STRIP: NEGATIVE
PH UR STRIP.AUTO: 7 [PH] (ref 5–8)
PLATELET # BLD AUTO: 219 10*3/MM3 (ref 140–450)
PMV BLD AUTO: 10.1 FL (ref 6–12)
POTASSIUM SERPL-SCNC: 4.3 MMOL/L (ref 3.5–5.2)
PROT SERPL-MCNC: 7.3 G/DL (ref 6–8.5)
PROT UR QL STRIP: ABNORMAL
PSA SERPL-MCNC: 2.97 NG/ML (ref 0–4)
RBC # BLD AUTO: 4.94 10*6/MM3 (ref 4.14–5.8)
RBC # UR: NORMAL /HPF
REF LAB TEST METHOD: NORMAL
SODIUM SERPL-SCNC: 140 MMOL/L (ref 136–145)
SP GR UR STRIP: 1.03 (ref 1–1.03)
SQUAMOUS #/AREA URNS HPF: NORMAL /HPF
TRIGL SERPL-MCNC: 140 MG/DL (ref 0–150)
UROBILINOGEN UR QL STRIP: ABNORMAL
VLDLC SERPL-MCNC: 25 MG/DL (ref 5–40)
WBC # BLD AUTO: 7.48 10*3/MM3 (ref 3.4–10.8)
WBC UR QL AUTO: NORMAL /HPF

## 2021-09-23 PROCEDURE — 80053 COMPREHEN METABOLIC PANEL: CPT

## 2021-09-23 PROCEDURE — 85027 COMPLETE CBC AUTOMATED: CPT

## 2021-09-23 PROCEDURE — 36415 COLL VENOUS BLD VENIPUNCTURE: CPT

## 2021-09-23 PROCEDURE — 82043 UR ALBUMIN QUANTITATIVE: CPT

## 2021-09-23 PROCEDURE — 81001 URINALYSIS AUTO W/SCOPE: CPT

## 2021-09-23 PROCEDURE — 83036 HEMOGLOBIN GLYCOSYLATED A1C: CPT

## 2021-09-23 PROCEDURE — C9803 HOPD COVID-19 SPEC COLLECT: HCPCS

## 2021-09-23 PROCEDURE — 86769 SARS-COV-2 COVID-19 ANTIBODY: CPT

## 2021-09-23 PROCEDURE — G0103 PSA SCREENING: HCPCS

## 2021-09-23 PROCEDURE — 80061 LIPID PANEL: CPT

## 2021-09-23 PROCEDURE — 99214 OFFICE O/P EST MOD 30 MIN: CPT | Performed by: NURSE PRACTITIONER

## 2021-09-23 RX ORDER — ROSUVASTATIN CALCIUM 5 MG/1
5 TABLET, COATED ORAL DAILY
Qty: 90 TABLET | Refills: 1 | Status: SHIPPED | OUTPATIENT
Start: 2021-09-23 | End: 2022-03-14 | Stop reason: SDUPTHER

## 2021-09-23 RX ORDER — FUROSEMIDE 40 MG/1
40 TABLET ORAL DAILY
Qty: 90 TABLET | Refills: 1 | Status: SHIPPED | OUTPATIENT
Start: 2021-09-23 | End: 2022-02-28 | Stop reason: SDUPTHER

## 2021-09-23 NOTE — PROGRESS NOTES
Chief Complaint  Joint Pain (f/u), Diabetes (f/u), and Hyperlipidemia (f/u)    Subjective        Idris Park presents to [facility]  Arthritis:  Well controlled on medication.    Hyperlipidemia: doing well on medication.    Diabetes: doing well on medication.    Recent teeth upper removed.  Having some irritation of new denture plate.    Awaiting cologuard results.          Past History:    Medical History: has a past medical history of Arthritis, COVID-19, Diabetes (CMS/HCC), Hyperlipidemia, Limb pain, and Limb swelling.     Surgical History: has a past surgical history that includes Joint replacement; Hernia repair (1967); Other surgical history; and Dental surgery.     Family History: family history includes Breast cancer (age of onset: 50) in his mother; Cancer in his mother; Other in his father.     Social History: reports that he has never smoked. He has never used smokeless tobacco. He reports previous alcohol use. He reports that he does not use drugs.    Allergies: Cephalexin          Current Outpatient Medications:   •  coenzyme Q10 100 MG capsule, Take 100 mg by mouth Daily., Disp: , Rfl:   •  etodolac (LODINE) 300 MG capsule, TAKE 1 CAPSULE BY MOUTH TWICE DAILY WITH FOOD, Disp: 180 capsule, Rfl: 1  •  furosemide (LASIX) 40 MG tablet, Take 1 tablet by mouth Daily., Disp: 90 tablet, Rfl: 1  •  gabapentin (NEURONTIN) 300 MG capsule, TAKE 1 CAPSULE BY MOUTH THREE TIMES DAILY, Disp: 270 capsule, Rfl: 0  •  rosuvastatin (CRESTOR) 5 MG tablet, Take 5 mg by mouth Daily., Disp: , Rfl:   •  SITagliptin (JANUVIA) 50 MG tablet, Take 1 tablet by mouth Daily., Disp: 90 tablet, Rfl: 1  •  traMADol (ULTRAM) 50 MG tablet, TAKE ONE TABLET BY MOUTH EVERY 6 HOURS AS NEEDED, Disp: 120 tablet, Rfl: 1    Medications Discontinued During This Encounter   Medication Reason   • cetirizine (zyrTEC) 10 MG tablet *Therapy completed   • SITagliptin (JANUVIA) 50 MG tablet Reorder   • furosemide  (LASIX) 40 MG tablet Reorder         Review of Systems   Constitutional: Negative for fever.   Respiratory: Negative for cough and shortness of breath.    Cardiovascular: Negative for chest pain, palpitations and leg swelling.   Neurological: Negative for dizziness, weakness, numbness and headache.        Objective         Vitals:    09/23/21 1052   BP: 106/72   BP Location: Right arm   Patient Position: Sitting   Cuff Size: Adult   Pulse: 70   Resp: 16   Temp: 97.5 °F (36.4 °C)   TempSrc: Infrared   SpO2: 96%     There is no height or weight on file to calculate BMI.         Physical Exam  Vitals reviewed.   Constitutional:       Appearance: Normal appearance. He is well-developed.   HENT:      Head: Normocephalic and atraumatic.      Mouth/Throat:      Pharynx: No oropharyngeal exudate.   Eyes:      Conjunctiva/sclera: Conjunctivae normal.      Pupils: Pupils are equal, round, and reactive to light.   Cardiovascular:      Rate and Rhythm: Normal rate and regular rhythm.      Heart sounds: No murmur heard.   No friction rub. No gallop.    Pulmonary:      Effort: Pulmonary effort is normal.      Breath sounds: Normal breath sounds. No wheezing or rhonchi.   Skin:     General: Skin is warm and dry.   Neurological:      Mental Status: He is alert and oriented to person, place, and time.   Psychiatric:         Mood and Affect: Mood and affect normal.         Behavior: Behavior normal.         Thought Content: Thought content normal.         Judgment: Judgment normal.             Result Review :               Assessment and Plan     Diagnoses and all orders for this visit:    1. COVID-19 (Primary)  -     SARS-CoV-2 Antibodies (Roche); Future    2. Edema, unspecified type  Comments:  continue lasix at current dose.  Orders:  -     furosemide (LASIX) 40 MG tablet; Take 1 tablet by mouth Daily.  Dispense: 90 tablet; Refill: 1    3. Prostate cancer screening  -     PSA SCREENING; Future    4. Hyperlipidemia, unspecified  hyperlipidemia type  Comments:  continue crestor at current dose.    5. Type 2 diabetes mellitus with hyperglycemia, without long-term current use of insulin (CMS/[facility])  Comments:  continue januvia at current dose.  Orders:  -     SITagliptin (JANUVIA) 50 MG tablet; Take 1 tablet by mouth Daily.  Dispense: 90 tablet; Refill: 1  -     Comprehensive metabolic panel; Future  -     Lipid Panel w/ Chol/HDL Ratio; Future  -     Urinalysis With Culture If Indicated -; Future  -     Hemoglobin A1c; Future  -     CBC No Differential; Future  -     MicroAlbumin, Urine, Random - Urine, Clean Catch; Future              Follow Up     Return in about 6 months (around 3/23/2022) for Next scheduled follow up.    Patient was given instructions and counseling regarding his condition or for health maintenance advice. Please see specific information pulled into the AVS if appropriate.

## 2021-09-24 LAB — SARS-COV-2 AB SERPL QL IA: NEGATIVE

## 2021-10-12 RX ORDER — ROPINIROLE 0.5 MG/1
TABLET, FILM COATED ORAL
Qty: 90 TABLET | Refills: 1 | Status: SHIPPED | OUTPATIENT
Start: 2021-10-12 | End: 2022-04-04 | Stop reason: SDUPTHER

## 2021-10-18 DIAGNOSIS — M54.50 CHRONIC LOW BACK PAIN, UNSPECIFIED BACK PAIN LATERALITY, UNSPECIFIED WHETHER SCIATICA PRESENT: Primary | ICD-10-CM

## 2021-10-18 DIAGNOSIS — G89.29 CHRONIC LOW BACK PAIN, UNSPECIFIED BACK PAIN LATERALITY, UNSPECIFIED WHETHER SCIATICA PRESENT: Primary | ICD-10-CM

## 2021-10-19 RX ORDER — GABAPENTIN 300 MG/1
300 CAPSULE ORAL 3 TIMES DAILY
Qty: 270 CAPSULE | Refills: 0 | Status: SHIPPED | OUTPATIENT
Start: 2021-10-19 | End: 2022-02-02

## 2021-11-04 DIAGNOSIS — R52 PAIN: ICD-10-CM

## 2021-11-04 RX ORDER — TRAMADOL HYDROCHLORIDE 50 MG/1
50 TABLET ORAL EVERY 6 HOURS PRN
Qty: 120 TABLET | Refills: 1 | Status: SHIPPED | OUTPATIENT
Start: 2021-11-04 | End: 2022-03-14 | Stop reason: SDUPTHER

## 2021-11-04 NOTE — TELEPHONE ENCOUNTER
----- Message from Idris Park sent at 11/4/2021 12:12 AM EDT -----  Regarding: Prescription refill  Need a refill called in to [facility] pharmacy [facility] location for Tramadol thanks

## 2021-12-10 ENCOUNTER — HOSPITAL ENCOUNTER (OUTPATIENT)
Dept: GENERAL RADIOLOGY | Facility: HOSPITAL | Age: 61
Discharge: HOME | End: 2021-12-10
Admitting: NURSE PRACTITIONER

## 2021-12-10 ENCOUNTER — TELEPHONE (OUTPATIENT)
Dept: FAMILY MEDICINE CLINIC | Facility: CLINIC | Age: 61
End: 2021-12-10

## 2021-12-10 ENCOUNTER — OFFICE VISIT (OUTPATIENT)
Dept: FAMILY MEDICINE CLINIC | Facility: CLINIC | Age: 61
End: 2021-12-10

## 2021-12-10 VITALS
RESPIRATION RATE: 16 BRPM | HEART RATE: 70 BPM | BODY MASS INDEX: 35.9 KG/M2 | WEIGHT: 242.4 LBS | OXYGEN SATURATION: 96 % | DIASTOLIC BLOOD PRESSURE: 76 MMHG | HEIGHT: 69 IN | SYSTOLIC BLOOD PRESSURE: 112 MMHG | TEMPERATURE: 97.8 F

## 2021-12-10 DIAGNOSIS — M54.6 ACUTE LEFT-SIDED THORACIC BACK PAIN: ICD-10-CM

## 2021-12-10 DIAGNOSIS — M62.838 MUSCLE SPASM OF LEFT SHOULDER: ICD-10-CM

## 2021-12-10 DIAGNOSIS — M54.6 ACUTE LEFT-SIDED THORACIC BACK PAIN: Primary | ICD-10-CM

## 2021-12-10 PROCEDURE — 72072 X-RAY EXAM THORAC SPINE 3VWS: CPT

## 2021-12-10 PROCEDURE — 99213 OFFICE O/P EST LOW 20 MIN: CPT | Performed by: NURSE PRACTITIONER

## 2021-12-10 RX ORDER — CYCLOBENZAPRINE HCL 10 MG
10 TABLET ORAL 3 TIMES DAILY PRN
Qty: 30 TABLET | Refills: 1 | Status: SHIPPED | OUTPATIENT
Start: 2021-12-10 | End: 2021-12-10

## 2021-12-10 RX ORDER — CYCLOBENZAPRINE HCL 10 MG
10 TABLET ORAL 3 TIMES DAILY PRN
Qty: 30 TABLET | Refills: 1 | Status: SHIPPED | OUTPATIENT
Start: 2021-12-10

## 2021-12-10 RX ORDER — METHYLPREDNISOLONE 4 MG/1
TABLET ORAL
Qty: 21 TABLET | Refills: 0 | Status: SHIPPED | OUTPATIENT
Start: 2021-12-10 | End: 2022-06-06

## 2021-12-10 RX ORDER — METHYLPREDNISOLONE 4 MG/1
TABLET ORAL
Qty: 21 TABLET | Refills: 0 | Status: SHIPPED | OUTPATIENT
Start: 2021-12-10 | End: 2021-12-10

## 2021-12-10 NOTE — PROGRESS NOTES
Chief Complaint  Neck Pain (back of head and upper back)    Rachel Park presents to [facility]  Slept differently last week and woke up with mid throracic pain at scapular region on left side.  It has now progressed to both sides of thoracic and into back of head.  Difficulty with raising head due to neck pain.    Neck Pain   Pertinent negatives include no chest pain, fever, numbness or weakness.         Past History:    Medical History: has a past medical history of Arthritis, COVID-19, Diabetes (HCC), Hyperlipidemia, Limb pain, and Limb swelling.     Surgical History: has a past surgical history that includes Joint replacement; Hernia repair (1967); Other surgical history; and Dental surgery.     Family History: family history includes Breast cancer (age of onset: 50) in his mother; Cancer in his mother; Other in his father.     Social History: reports that he has never smoked. He has never used smokeless tobacco. He reports previous alcohol use. He reports that he does not use drugs.    Allergies: Cephalexin          Current Outpatient Medications:   •  coenzyme Q10 100 MG capsule, Take 100 mg by mouth Daily., Disp: , Rfl:   •  etodolac (LODINE) 300 MG capsule, TAKE 1 CAPSULE BY MOUTH TWICE DAILY WITH FOOD, Disp: 180 capsule, Rfl: 1  •  furosemide (LASIX) 40 MG tablet, Take 1 tablet by mouth Daily., Disp: 90 tablet, Rfl: 1  •  gabapentin (NEURONTIN) 300 MG capsule, Take 1 capsule by mouth 3 (Three) Times a Day., Disp: 270 capsule, Rfl: 0  •  rOPINIRole (REQUIP) 0.5 MG tablet, TAKE 1 TABLET BY MOUTH 1 TO 3 HOURS BEFORE BEDTIME., Disp: 90 tablet, Rfl: 1  •  rosuvastatin (CRESTOR) 5 MG tablet, Take 1 tablet by mouth Daily., Disp: 90 tablet, Rfl: 1  •  SITagliptin (JANUVIA) 50 MG tablet, Take 1 tablet by mouth Daily., Disp: 90 tablet, Rfl: 1  •  traMADol (ULTRAM) 50 MG tablet, Take 1 tablet by mouth Every 6 (Six) Hours As Needed for Moderate Pain ., Disp: 120  "tablet, Rfl: 1  •  cyclobenzaprine (FLEXERIL) 10 MG tablet, Take 1 tablet by mouth 3 (Three) Times a Day As Needed for Muscle Spasms., Disp: 30 tablet, Rfl: 1  •  methylPREDNISolone (MEDROL) 4 MG dose pack, Take as directed on package instructions., Disp: 21 tablet, Rfl: 0    There are no discontinued medications.      Review of Systems   Constitutional: Negative for fever.   Respiratory: Negative for cough and shortness of breath.    Cardiovascular: Negative for chest pain, palpitations and leg swelling.   Musculoskeletal: Positive for neck pain.   Neurological: Negative for dizziness, weakness, numbness and headache.        Objective         Vitals:    12/10/21 1352   BP: 112/76   BP Location: Right arm   Patient Position: Sitting   Cuff Size: Adult   Pulse: 70   Resp: 16   Temp: 97.8 °F (36.6 °C)   TempSrc: Infrared   SpO2: 96%   Weight: 110 kg (242 lb 6.4 oz)   Height: 175.3 cm (69\")     Body mass index is 35.8 kg/m².         Physical Exam  Vitals reviewed.   Constitutional:       Appearance: Normal appearance. He is well-developed.   HENT:      Head: Normocephalic and atraumatic.      Mouth/Throat:      Pharynx: No oropharyngeal exudate.   Eyes:      Conjunctiva/sclera: Conjunctivae normal.      Pupils: Pupils are equal, round, and reactive to light.   Cardiovascular:      Rate and Rhythm: Normal rate and regular rhythm.      Heart sounds: No murmur heard.  No friction rub. No gallop.    Pulmonary:      Effort: Pulmonary effort is normal.      Breath sounds: Normal breath sounds. No wheezing or rhonchi.   Musculoskeletal:        Arms:       Comments: Tenderness to area and   Skin:     General: Skin is warm and dry.   Neurological:      Mental Status: He is alert and oriented to person, place, and time.   Psychiatric:         Mood and Affect: Mood and affect normal.         Behavior: Behavior normal.         Thought Content: Thought content normal.         Judgment: Judgment normal.             Result Review " :               Assessment and Plan     Diagnoses and all orders for this visit:    1. Acute left-sided thoracic back pain (Primary)  Comments:  knows to watch glucose while taking steroid.  Will hole etodolac while taking.  Orders:  -     XR Spine Thoracic 3 View; Future  -     methylPREDNISolone (MEDROL) 4 MG dose pack; Take as directed on package instructions.  Dispense: 21 tablet; Refill: 0    2. Muscle spasm of left shoulder  -     cyclobenzaprine (FLEXERIL) 10 MG tablet; Take 1 tablet by mouth 3 (Three) Times a Day As Needed for Muscle Spasms.  Dispense: 30 tablet; Refill: 1              Follow Up     Return for Next scheduled follow up.    Patient was given instructions and counseling regarding his condition or for health maintenance advice. Please see specific information pulled into the AVS if appropriate.

## 2022-02-02 DIAGNOSIS — M54.50 CHRONIC LOW BACK PAIN, UNSPECIFIED BACK PAIN LATERALITY, UNSPECIFIED WHETHER SCIATICA PRESENT: ICD-10-CM

## 2022-02-02 DIAGNOSIS — G89.29 CHRONIC LOW BACK PAIN, UNSPECIFIED BACK PAIN LATERALITY, UNSPECIFIED WHETHER SCIATICA PRESENT: ICD-10-CM

## 2022-02-02 RX ORDER — GABAPENTIN 300 MG/1
CAPSULE ORAL
Qty: 270 CAPSULE | Refills: 0 | Status: SHIPPED | OUTPATIENT
Start: 2022-02-02 | End: 2022-02-09 | Stop reason: SDUPTHER

## 2022-02-02 NOTE — TELEPHONE ENCOUNTER
Patient is needing a refill. Medication pended.     Last office visit: 12/10/2021   Next visit: None   UDS: 10/02/2020

## 2022-02-09 DIAGNOSIS — G89.29 CHRONIC LOW BACK PAIN, UNSPECIFIED BACK PAIN LATERALITY, UNSPECIFIED WHETHER SCIATICA PRESENT: ICD-10-CM

## 2022-02-09 DIAGNOSIS — M54.50 CHRONIC LOW BACK PAIN, UNSPECIFIED BACK PAIN LATERALITY, UNSPECIFIED WHETHER SCIATICA PRESENT: ICD-10-CM

## 2022-02-09 RX ORDER — GABAPENTIN 300 MG/1
300 CAPSULE ORAL 3 TIMES DAILY
Qty: 270 CAPSULE | Refills: 0 | Status: SHIPPED | OUTPATIENT
Start: 2022-02-09 | End: 2022-06-02 | Stop reason: SDUPTHER

## 2022-02-09 NOTE — TELEPHONE ENCOUNTER
Lmtcb, medication was refilled last week for next refill pt will need an updated UDS and Contract

## 2022-02-09 NOTE — TELEPHONE ENCOUNTER
Requesting medication refill of Gabapentin 300mg cap. Qty: 270. Please send to [facility] mail order pharmacy

## 2022-02-28 DIAGNOSIS — R60.9 EDEMA, UNSPECIFIED TYPE: ICD-10-CM

## 2022-02-28 RX ORDER — FUROSEMIDE 40 MG/1
40 TABLET ORAL DAILY
Qty: 90 TABLET | Refills: 1 | Status: SHIPPED | OUTPATIENT
Start: 2022-02-28 | End: 2023-01-05 | Stop reason: SDUPTHER

## 2022-02-28 NOTE — TELEPHONE ENCOUNTER
----- Message from Idris Park sent at 2/28/2022 12:58 PM EST -----  Regarding: Refills  Need to have refills called in to [facility] for my furosemide thanks

## 2022-03-14 DIAGNOSIS — R52 PAIN: ICD-10-CM

## 2022-03-14 DIAGNOSIS — E11.65 TYPE 2 DIABETES MELLITUS WITH HYPERGLYCEMIA, WITHOUT LONG-TERM CURRENT USE OF INSULIN: ICD-10-CM

## 2022-03-14 RX ORDER — ROSUVASTATIN CALCIUM 5 MG/1
5 TABLET, COATED ORAL DAILY
Qty: 90 TABLET | Refills: 1 | Status: SHIPPED | OUTPATIENT
Start: 2022-03-14

## 2022-03-14 RX ORDER — TRAMADOL HYDROCHLORIDE 50 MG/1
50 TABLET ORAL EVERY 6 HOURS PRN
Qty: 120 TABLET | Refills: 1 | Status: SHIPPED | OUTPATIENT
Start: 2022-03-14 | End: 2022-06-02 | Stop reason: SDUPTHER

## 2022-03-14 NOTE — TELEPHONE ENCOUNTER
----- Message from Idris Park sent at 3/11/2022  6:53 PM EST -----  Regarding: Refills  Need refills on Rosuvstatin calcium 5mg and Tramadol HCL 50 mg call into [facility]

## 2022-03-23 ENCOUNTER — TELEPHONE (OUTPATIENT)
Dept: FAMILY MEDICINE CLINIC | Facility: CLINIC | Age: 62
End: 2022-03-23

## 2022-03-23 DIAGNOSIS — M19.90 ARTHRITIS: ICD-10-CM

## 2022-03-23 DIAGNOSIS — M19.90 ARTHRITIS: Primary | ICD-10-CM

## 2022-03-23 NOTE — TELEPHONE ENCOUNTER
----- Message from Idris Park sent at 3/23/2022 12:03 PM EDT -----  Regarding: Refills  Need refills for etodolac send in to [facility] thanks

## 2022-04-04 ENCOUNTER — TELEPHONE (OUTPATIENT)
Dept: FAMILY MEDICINE CLINIC | Facility: CLINIC | Age: 62
End: 2022-04-04

## 2022-04-04 RX ORDER — ROPINIROLE 0.5 MG/1
TABLET, FILM COATED ORAL
Qty: 90 TABLET | Refills: 1 | Status: SHIPPED | OUTPATIENT
Start: 2022-04-04 | End: 2022-06-06 | Stop reason: SDUPTHER

## 2022-04-04 NOTE — TELEPHONE ENCOUNTER
Damaris from [facility] pharmacy was calling in regard of patient's ropinirole 0.5mg tablet directions. She needs to know how many tablets he is taking before bedtime.

## 2022-04-04 NOTE — TELEPHONE ENCOUNTER
----- Message from Idris Park sent at 4/4/2022 12:02 PM EDT -----  Regarding: Refill  Need refill called into [facility] for ropinirole HCL.5 MG Thanks

## 2022-05-30 DIAGNOSIS — R52 PAIN: ICD-10-CM

## 2022-06-01 RX ORDER — TRAMADOL HYDROCHLORIDE 50 MG/1
TABLET ORAL
Qty: 120 TABLET | OUTPATIENT
Start: 2022-06-01

## 2022-06-02 ENCOUNTER — CLINICAL SUPPORT (OUTPATIENT)
Dept: FAMILY MEDICINE CLINIC | Facility: CLINIC | Age: 62
End: 2022-06-02

## 2022-06-02 DIAGNOSIS — Z79.899 MEDICATION MANAGEMENT: Primary | ICD-10-CM

## 2022-06-02 DIAGNOSIS — G89.29 CHRONIC LOW BACK PAIN, UNSPECIFIED BACK PAIN LATERALITY, UNSPECIFIED WHETHER SCIATICA PRESENT: ICD-10-CM

## 2022-06-02 DIAGNOSIS — M54.50 CHRONIC LOW BACK PAIN, UNSPECIFIED BACK PAIN LATERALITY, UNSPECIFIED WHETHER SCIATICA PRESENT: ICD-10-CM

## 2022-06-02 DIAGNOSIS — R52 PAIN: ICD-10-CM

## 2022-06-02 PROCEDURE — 80305 DRUG TEST PRSMV DIR OPT OBS: CPT | Performed by: NURSE PRACTITIONER

## 2022-06-02 RX ORDER — TRAMADOL HYDROCHLORIDE 50 MG/1
50 TABLET ORAL EVERY 6 HOURS PRN
Qty: 120 TABLET | Refills: 1 | Status: SHIPPED | OUTPATIENT
Start: 2022-06-02 | End: 2022-06-06

## 2022-06-02 RX ORDER — GABAPENTIN 300 MG/1
300 CAPSULE ORAL 3 TIMES DAILY
Qty: 270 CAPSULE | Refills: 0 | Status: SHIPPED | OUTPATIENT
Start: 2022-06-02 | End: 2022-06-03 | Stop reason: SDUPTHER

## 2022-06-03 ENCOUNTER — TELEPHONE (OUTPATIENT)
Dept: FAMILY MEDICINE CLINIC | Facility: CLINIC | Age: 62
End: 2022-06-03

## 2022-06-03 DIAGNOSIS — M54.50 CHRONIC LOW BACK PAIN, UNSPECIFIED BACK PAIN LATERALITY, UNSPECIFIED WHETHER SCIATICA PRESENT: ICD-10-CM

## 2022-06-03 DIAGNOSIS — G89.29 CHRONIC LOW BACK PAIN, UNSPECIFIED BACK PAIN LATERALITY, UNSPECIFIED WHETHER SCIATICA PRESENT: ICD-10-CM

## 2022-06-03 RX ORDER — GABAPENTIN 300 MG/1
300 CAPSULE ORAL 3 TIMES DAILY
Qty: 270 CAPSULE | Refills: 0 | Status: SHIPPED | OUTPATIENT
Start: 2022-06-03 | End: 2022-09-14 | Stop reason: SDUPTHER

## 2022-06-03 NOTE — TELEPHONE ENCOUNTER
Pharmacy Name:  [facility]    Pharmacy representative name: ONEL    Pharmacy representative phone number: [phone] OPTION 3    What medication are you calling in regards to: GABAPENTIN    Who is the provider that prescribed the medication: LORENA NOONAN    Additional notes: PHARMACY IS UNABLE TO ACCEPT THE SCRIPT DUE TO IT BEING CONSIDERED A CONTROLLED SUBSTANCE BY LAW AND THEY CANNOT ACCEPT THE SCRIPT FROM A MID LEVEL PROVIDER. THEY WOULD LIKE TO KNOW IF A DOCTOR IN THE PRACTICE CAN WRITE OUT THE SCRIPT AND SEND IT OVER. IF A DOCTOR IS UNABLE TO SIGN OFF ON THE MEDICATION, THEY RECOMMEND THAT THE SCRIPT IS SENT TO A LOCAL PHARMACY.

## 2022-06-03 NOTE — TELEPHONE ENCOUNTER
[facility] WILL NOT ACCEPT THE CONTROLLED SUBSTANCE.     PLEASE RESEND MEDICATION TO LOCAL PHARMACY .      PENDED

## 2022-06-06 ENCOUNTER — OFFICE VISIT (OUTPATIENT)
Dept: FAMILY MEDICINE CLINIC | Facility: CLINIC | Age: 62
End: 2022-06-06

## 2022-06-06 VITALS
HEART RATE: 71 BPM | TEMPERATURE: 98.2 F | WEIGHT: 238.4 LBS | RESPIRATION RATE: 16 BRPM | SYSTOLIC BLOOD PRESSURE: 108 MMHG | DIASTOLIC BLOOD PRESSURE: 84 MMHG | OXYGEN SATURATION: 98 % | BODY MASS INDEX: 35.31 KG/M2 | HEIGHT: 69 IN

## 2022-06-06 DIAGNOSIS — G89.29 CHRONIC MIDLINE LOW BACK PAIN WITH LEFT-SIDED SCIATICA: ICD-10-CM

## 2022-06-06 DIAGNOSIS — Z11.59 NEED FOR HEPATITIS C SCREENING TEST: Primary | ICD-10-CM

## 2022-06-06 DIAGNOSIS — G25.81 RESTLESS LEG SYNDROME: ICD-10-CM

## 2022-06-06 DIAGNOSIS — E11.65 TYPE 2 DIABETES MELLITUS WITH HYPERGLYCEMIA, WITHOUT LONG-TERM CURRENT USE OF INSULIN: ICD-10-CM

## 2022-06-06 DIAGNOSIS — Z00.00 ANNUAL PHYSICAL EXAM: ICD-10-CM

## 2022-06-06 DIAGNOSIS — R52 PAIN: ICD-10-CM

## 2022-06-06 DIAGNOSIS — M54.42 CHRONIC MIDLINE LOW BACK PAIN WITH LEFT-SIDED SCIATICA: ICD-10-CM

## 2022-06-06 PROCEDURE — 99396 PREV VISIT EST AGE 40-64: CPT | Performed by: NURSE PRACTITIONER

## 2022-06-06 PROCEDURE — 99214 OFFICE O/P EST MOD 30 MIN: CPT | Performed by: NURSE PRACTITIONER

## 2022-06-06 RX ORDER — ROPINIROLE 1 MG/1
TABLET, FILM COATED ORAL
Qty: 90 TABLET | Refills: 1 | Status: SHIPPED | OUTPATIENT
Start: 2022-06-06 | End: 2023-01-19 | Stop reason: SDUPTHER

## 2022-06-06 RX ORDER — TRAMADOL HYDROCHLORIDE 50 MG/1
50 TABLET ORAL EVERY 6 HOURS PRN
Qty: 120 TABLET | Refills: 0 | Status: CANCELLED | OUTPATIENT
Start: 2022-06-06

## 2022-06-06 NOTE — PROGRESS NOTES
Chief Complaint  Annual Exam, Diabetes, and leg cramps    Subjective        Idris Park presents to [facility]  Diabetes:  Doing well on medication.  Doesn't check sugars regularly.    Restless leg syndrome:  ropinorole working well.    Annual exam:  Discussed diet and exercise.    Neuropathy:  Both feet that he has for years.    Left buttocks pain when driving. Denies pain to leg.          Past History:    Medical History: has a past medical history of Arthritis, COVID-19, Diabetes (HCC), Hyperlipidemia, Limb pain, and Limb swelling.     Surgical History: has a past surgical history that includes Joint replacement; Hernia repair (1967); Other surgical history; and Dental surgery.     Family History: family history includes Breast cancer (age of onset: 50) in his mother; Cancer in his mother; Other in his father.     Social History: reports that he has never smoked. He has never used smokeless tobacco. He reports previous alcohol use. He reports that he does not use drugs.    Allergies: Cephalexin          Current Outpatient Medications:   •  coenzyme Q10 100 MG capsule, Take 100 mg by mouth Daily., Disp: , Rfl:   •  cyclobenzaprine (FLEXERIL) 10 MG tablet, Take 1 tablet by mouth 3 (Three) Times a Day As Needed for Muscle Spasms., Disp: 30 tablet, Rfl: 1  •  etodolac (LODINE) 300 MG capsule, Take 1 capsule by mouth 2 (Two) Times a Day With Meals., Disp: 180 capsule, Rfl: 1  •  furosemide (LASIX) 40 MG tablet, Take 1 tablet by mouth Daily., Disp: 90 tablet, Rfl: 1  •  gabapentin (NEURONTIN) 300 MG capsule, Take 1 capsule by mouth 3 (Three) Times a Day., Disp: 270 capsule, Rfl: 0  •  rOPINIRole (REQUIP) 1 MG tablet, Take 1 hour before bedtime., Disp: 90 tablet, Rfl: 1  •  rosuvastatin (CRESTOR) 5 MG tablet, Take 1 tablet by mouth Daily., Disp: 90 tablet, Rfl: 1    Medications Discontinued During This Encounter   Medication Reason   • methylPREDNISolone (MEDROL) 4 MG dose pack  "*Therapy completed   • SITagliptin (JANUVIA) 50 MG tablet *Therapy completed   • traMADol (ULTRAM) 50 MG tablet *Therapy completed   • rOPINIRole (REQUIP) 0.5 MG tablet Reorder         Review of Systems   Constitutional: Negative for fever.   Respiratory: Negative for cough and shortness of breath.    Cardiovascular: Negative for chest pain, palpitations and leg swelling.   Neurological: Negative for dizziness, weakness, numbness and headache.        Objective         Vitals:    06/06/22 1433   BP: 108/84   BP Location: Right arm   Patient Position: Sitting   Cuff Size: Adult   Pulse: 71   Resp: 16   Temp: 98.2 °F (36.8 °C)   TempSrc: Infrared   SpO2: 98%   Weight: 108 kg (238 lb 6.4 oz)   Height: 175.3 cm (69.02\")     Body mass index is 35.19 kg/m².         Physical Exam  Vitals reviewed.   Constitutional:       Appearance: Normal appearance. He is well-developed.   HENT:      Head: Normocephalic and atraumatic.      Mouth/Throat:      Pharynx: No oropharyngeal exudate.   Eyes:      Conjunctiva/sclera: Conjunctivae normal.      Pupils: Pupils are equal, round, and reactive to light.   Cardiovascular:      Rate and Rhythm: Normal rate and regular rhythm.      Pulses:           Dorsalis pedis pulses are 2+ on the right side and 2+ on the left side.      Heart sounds: Normal heart sounds. No murmur heard.    No friction rub. No gallop.   Pulmonary:      Effort: Pulmonary effort is normal.      Breath sounds: Normal breath sounds. No wheezing or rhonchi.   Feet:      Right foot:      Protective Sensation: 10 sites tested. 0 sites sensed.      Skin integrity: Callus present. No ulcer or blister.      Toenail Condition: Right toenails are normal.      Left foot:      Protective Sensation: 10 sites tested. 1 site sensed.     Skin integrity: Callus present. No ulcer or blister.      Toenail Condition: Left toenails are normal.      Comments:    callus on both heels  Skin:     General: Skin is warm and dry.   Neurological: "      Mental Status: He is alert and oriented to person, place, and time.   Psychiatric:         Mood and Affect: Mood and affect normal.         Behavior: Behavior normal.         Thought Content: Thought content normal.         Judgment: Judgment normal.             Result Review :               Assessment and Plan     Diagnoses and all orders for this visit:    1. Need for hepatitis C screening test (Primary)  -     Hepatitis C antibody; Future    2. Pain    3. Annual physical exam  Comments:  discussed diet and exercise.    4. Restless leg syndrome  -     rOPINIRole (REQUIP) 1 MG tablet; Take 1 hour before bedtime.  Dispense: 90 tablet; Refill: 1    5. Type 2 diabetes mellitus with hyperglycemia, without long-term current use of insulin (HCC)  -     Comprehensive Metabolic Panel; Future  -     Hemoglobin A1c; Future  -     Lipid Panel With / Chol / HDL Ratio; Future  -     Urinalysis With Culture If Indicated -; Future  -     MicroAlbumin, Urine, Random - Urine, Clean Catch; Future  -     CBC & Differential; Future  -     Comprehensive Metabolic Panel; Future  -     Hemoglobin A1c; Future  -     Urinalysis With Culture If Indicated -; Future  -     Lipid Panel; Future  -     MicroAlbumin, Urine, Random - Urine, Clean Catch; Future    6. Chronic midline low back pain with left-sided sciatica  -     XR Spine Lumbar 4+ View; Future              Follow Up     Return in about 6 months (around 12/6/2022).    Patient was given instructions and counseling regarding his condition or for health maintenance advice. Please see specific information pulled into the AVS if appropriate.

## 2022-06-10 ENCOUNTER — HOSPITAL ENCOUNTER (OUTPATIENT)
Dept: GENERAL RADIOLOGY | Facility: HOSPITAL | Age: 62
Discharge: HOME | End: 2022-06-10

## 2022-06-10 ENCOUNTER — LAB (OUTPATIENT)
Dept: LAB | Facility: HOSPITAL | Age: 62
End: 2022-06-10

## 2022-06-10 DIAGNOSIS — E11.65 TYPE 2 DIABETES MELLITUS WITH HYPERGLYCEMIA, WITHOUT LONG-TERM CURRENT USE OF INSULIN: ICD-10-CM

## 2022-06-10 DIAGNOSIS — G89.29 CHRONIC MIDLINE LOW BACK PAIN WITH LEFT-SIDED SCIATICA: ICD-10-CM

## 2022-06-10 DIAGNOSIS — Z11.59 NEED FOR HEPATITIS C SCREENING TEST: ICD-10-CM

## 2022-06-10 DIAGNOSIS — M54.42 CHRONIC MIDLINE LOW BACK PAIN WITH LEFT-SIDED SCIATICA: ICD-10-CM

## 2022-06-10 LAB
ALBUMIN SERPL-MCNC: 4 G/DL (ref 3.5–5.2)
ALBUMIN UR-MCNC: <1.2 MG/DL
ALBUMIN/GLOB SERPL: 1.4 G/DL
ALP SERPL-CCNC: 99 U/L (ref 39–117)
ALT SERPL W P-5'-P-CCNC: 14 U/L (ref 1–41)
ANION GAP SERPL CALCULATED.3IONS-SCNC: 15.3 MMOL/L (ref 5–15)
AST SERPL-CCNC: 16 U/L (ref 1–40)
BILIRUB SERPL-MCNC: 0.6 MG/DL (ref 0–1.2)
BILIRUB UR QL STRIP: NEGATIVE
BUN SERPL-MCNC: 12 MG/DL (ref 8–23)
BUN/CREAT SERPL: 11.9 (ref 7–25)
CALCIUM SPEC-SCNC: 8.8 MG/DL (ref 8.6–10.5)
CHLORIDE SERPL-SCNC: 100 MMOL/L (ref 98–107)
CHOLEST SERPL-MCNC: 135 MG/DL (ref 0–200)
CLARITY UR: CLEAR
CO2 SERPL-SCNC: 23.7 MMOL/L (ref 22–29)
COLOR UR: YELLOW
CREAT SERPL-MCNC: 1.01 MG/DL (ref 0.76–1.27)
EGFRCR SERPLBLD CKD-EPI 2021: 84.6 ML/MIN/1.73
GLOBULIN UR ELPH-MCNC: 2.9 GM/DL
GLUCOSE SERPL-MCNC: 150 MG/DL (ref 65–99)
GLUCOSE UR STRIP-MCNC: NEGATIVE MG/DL
HBA1C MFR BLD: 6.2 % (ref 4.8–5.6)
HCV AB SER DONR QL: NORMAL
HDLC SERPL QL: 3.21
HDLC SERPL-MCNC: 42 MG/DL (ref 40–60)
HGB UR QL STRIP.AUTO: NEGATIVE
KETONES UR QL STRIP: NEGATIVE
LDLC SERPL CALC-MCNC: 64 MG/DL (ref 0–100)
LEUKOCYTE ESTERASE UR QL STRIP.AUTO: NEGATIVE
NITRITE UR QL STRIP: NEGATIVE
PH UR STRIP.AUTO: 6 [PH] (ref 5–8)
POTASSIUM SERPL-SCNC: 3.9 MMOL/L (ref 3.5–5.2)
PROT SERPL-MCNC: 6.9 G/DL (ref 6–8.5)
PROT UR QL STRIP: NEGATIVE
SODIUM SERPL-SCNC: 139 MMOL/L (ref 136–145)
SP GR UR STRIP: 1.02 (ref 1–1.03)
TRIGL SERPL-MCNC: 171 MG/DL (ref 0–150)
UROBILINOGEN UR QL STRIP: NORMAL
VLDLC SERPL-MCNC: 29 MG/DL (ref 5–40)

## 2022-06-10 PROCEDURE — 83036 HEMOGLOBIN GLYCOSYLATED A1C: CPT

## 2022-06-10 PROCEDURE — 36415 COLL VENOUS BLD VENIPUNCTURE: CPT

## 2022-06-10 PROCEDURE — 82043 UR ALBUMIN QUANTITATIVE: CPT

## 2022-06-10 PROCEDURE — 80053 COMPREHEN METABOLIC PANEL: CPT

## 2022-06-10 PROCEDURE — 80061 LIPID PANEL: CPT

## 2022-06-10 PROCEDURE — 72110 X-RAY EXAM L-2 SPINE 4/>VWS: CPT

## 2022-06-10 PROCEDURE — 86803 HEPATITIS C AB TEST: CPT

## 2022-06-10 PROCEDURE — 81003 URINALYSIS AUTO W/O SCOPE: CPT

## 2022-07-01 ENCOUNTER — HOSPITAL ENCOUNTER (OUTPATIENT)
Dept: MRI IMAGING | Facility: HOSPITAL | Age: 62
Discharge: HOME | End: 2022-07-01
Admitting: NURSE PRACTITIONER

## 2022-07-01 DIAGNOSIS — G89.29 CHRONIC MIDLINE LOW BACK PAIN WITH LEFT-SIDED SCIATICA: ICD-10-CM

## 2022-07-01 DIAGNOSIS — M54.42 CHRONIC MIDLINE LOW BACK PAIN WITH LEFT-SIDED SCIATICA: ICD-10-CM

## 2022-07-01 PROCEDURE — 72148 MRI LUMBAR SPINE W/O DYE: CPT

## 2022-08-15 DIAGNOSIS — M19.90 ARTHRITIS: ICD-10-CM

## 2022-08-25 ENCOUNTER — TELEPHONE (OUTPATIENT)
Dept: ORTHOPEDIC SURGERY | Facility: CLINIC | Age: 62
End: 2022-08-25

## 2022-08-25 NOTE — TELEPHONE ENCOUNTER
Provider: CABRERA  Caller: JUDY  Phone Number: [phone]  Reason for Call: *PATIENT IS CALL TO GET SCHEDULE FOR RIGHT KNEE  INJECTIONS

## 2022-08-26 NOTE — TELEPHONE ENCOUNTER
Patient states that the pharmacy has not received this prescription. He is requesting this be resent to [facility] on [address].

## 2022-09-07 ENCOUNTER — OFFICE VISIT (OUTPATIENT)
Dept: ORTHOPEDIC SURGERY | Facility: CLINIC | Age: 62
End: 2022-09-07

## 2022-09-07 VITALS — HEART RATE: 71 BPM | BODY MASS INDEX: 35.25 KG/M2 | OXYGEN SATURATION: 99 % | HEIGHT: 69 IN | WEIGHT: 238 LBS

## 2022-09-07 DIAGNOSIS — M17.11 PRIMARY OSTEOARTHRITIS OF RIGHT KNEE: ICD-10-CM

## 2022-09-07 DIAGNOSIS — M25.561 RIGHT KNEE PAIN, UNSPECIFIED CHRONICITY: Primary | ICD-10-CM

## 2022-09-07 PROCEDURE — 20610 DRAIN/INJ JOINT/BURSA W/O US: CPT | Performed by: ORTHOPAEDIC SURGERY

## 2022-09-07 RX ORDER — LIDOCAINE HYDROCHLORIDE 10 MG/ML
5 INJECTION, SOLUTION INFILTRATION; PERINEURAL
Status: COMPLETED | OUTPATIENT
Start: 2022-09-07 | End: 2022-09-07

## 2022-09-07 RX ORDER — TRIAMCINOLONE ACETONIDE 40 MG/ML
40 INJECTION, SUSPENSION INTRA-ARTICULAR; INTRAMUSCULAR
Status: COMPLETED | OUTPATIENT
Start: 2022-09-07 | End: 2022-09-07

## 2022-09-07 RX ADMIN — TRIAMCINOLONE ACETONIDE 40 MG: 40 INJECTION, SUSPENSION INTRA-ARTICULAR; INTRAMUSCULAR at 07:49

## 2022-09-07 RX ADMIN — LIDOCAINE HYDROCHLORIDE 5 ML: 10 INJECTION, SOLUTION INFILTRATION; PERINEURAL at 07:49

## 2022-09-07 NOTE — PROGRESS NOTES
"Chief Complaint  Follow-up and Pain of the Right Knee     Subjective      Idris Park presents to [facility] for a follow-up of right knee. Patient has a history of right knee osteoarthritis, last seen in 2020. He has been having right knee pain for several years. Last several months he has been having worsening of pain. He denies any injury or trauma.     Allergies   Allergen Reactions   • Cephalexin Rash        Social History     Socioeconomic History   • Marital status:    Tobacco Use   • Smoking status: Never Smoker   • Smokeless tobacco: Never Used   Vaping Use   • Vaping Use: Never used   Substance and Sexual Activity   • Alcohol use: Not Currently     Comment: socially   • Drug use: Never   • Sexual activity: Yes     Partners: Female        Review of Systems     Objective   Vital Signs:   Pulse 71   Ht 175.3 cm (69\")   Wt 108 kg (238 lb)   SpO2 99%   BMI 35.15 kg/m²       Physical Exam  Constitutional:       Appearance: Normal appearance. Patient is well-developed and normal weight.   HENT:      Head: Normocephalic.      Right Ear: Hearing and external ear normal.      Left Ear: Hearing and external ear normal.      Nose: Nose normal.   Eyes:      Conjunctiva/sclera: Conjunctivae normal.   Cardiovascular:      Rate and Rhythm: Normal rate.   Pulmonary:      Effort: Pulmonary effort is normal.      Breath sounds: No wheezing or rales.   Abdominal:      Palpations: Abdomen is soft.      Tenderness: There is no abdominal tenderness.   Musculoskeletal:      Cervical back: Normal range of motion.   Skin:     Findings: No rash.   Neurological:      Mental Status: Patient  is alert and oriented to person, place, and time.   Psychiatric:         Mood and Affect: Mood and affect normal.         Judgment: Judgment normal.       Ortho Exam      RIGHT KNEE: Tender medial joint line. Tender lateral joint line. Calf supple, non-tender, no signs of DVT. Dorsal Pedal Pulse 2+, " posterior tibialis pulse 2+. Good strength to hamstrings, quadriceps, dorsiflexors and plantar flexors. Stable to varus/valgus stress. Stable anterior and posterior drawer. Negative Lachman. Varus alignment. Crepitus with motion.       Large Joint Arthrocentesis: R knee  Date/Time: 9/7/2022 7:49 AM  Consent given by: patient  Site marked: site marked  Timeout: Immediately prior to procedure a time out was called to verify the correct patient, procedure, equipment, support staff and site/side marked as required   Supporting Documentation  Indications: pain   Procedure Details  Location: knee - R knee  Needle size: 22 G  Medications administered: 5 mL lidocaine 1 %; 40 mg triamcinolone acetonide 40 MG/ML  Patient tolerance: patient tolerated the procedure well with no immediate complications              Imaging Results (Most Recent)     Procedure Component Value Units Date/Time    XR Knee 3 View Right [[id number]] Resulted: 09/07/22 0740     Updated: 09/07/22 0741           Result Review :     X-Ray Report:  Right knee(s) X-Ray  Indication: Evaluation of right knee pain   AP, Lateral and Standing view(s)  Findings: Bone-on-bone osteoarthritis. No acute fractures or dislocation.   Prior studies available for comparison: no          Assessment and Plan     Diagnoses and all orders for this visit:    1. Right knee pain, unspecified chronicity (Primary)  -     XR Knee 3 View Right    2. Primary osteoarthritis of right knee        Plan for a right knee steroid injection, he tolerated this well. He wants to avoid surgical intervention at this time.     Call or return if worsening symptoms.    Follow Up     PRN.       Patient was given instructions and counseling regarding his condition or for health maintenance advice. Please see specific information pulled into the AVS if appropriate.     Scribed for Jb Campos MD by Kaylene Li.  09/07/22   07:35 EDT    I have personally performed the services described in this  document as scribed by the above individual and it is both accurate and complete. Jb Campos MD 09/07/22

## 2022-09-14 DIAGNOSIS — M54.50 CHRONIC LOW BACK PAIN, UNSPECIFIED BACK PAIN LATERALITY, UNSPECIFIED WHETHER SCIATICA PRESENT: ICD-10-CM

## 2022-09-14 DIAGNOSIS — G89.29 CHRONIC LOW BACK PAIN, UNSPECIFIED BACK PAIN LATERALITY, UNSPECIFIED WHETHER SCIATICA PRESENT: ICD-10-CM

## 2022-09-15 RX ORDER — GABAPENTIN 300 MG/1
300 CAPSULE ORAL 3 TIMES DAILY
Qty: 270 CAPSULE | Refills: 0 | Status: SHIPPED | OUTPATIENT
Start: 2022-09-15 | End: 2023-01-10 | Stop reason: SDUPTHER

## 2022-10-10 RX ORDER — ROPINIROLE 0.5 MG/1
TABLET, FILM COATED ORAL
Qty: 90 TABLET | Refills: 1 | OUTPATIENT
Start: 2022-10-10

## 2022-12-07 ENCOUNTER — TELEPHONE (OUTPATIENT)
Dept: FAMILY MEDICINE CLINIC | Facility: CLINIC | Age: 62
End: 2022-12-07

## 2022-12-29 ENCOUNTER — LAB (OUTPATIENT)
Dept: LAB | Facility: HOSPITAL | Age: 62
End: 2022-12-29
Payer: COMMERCIAL

## 2022-12-29 DIAGNOSIS — E11.65 TYPE 2 DIABETES MELLITUS WITH HYPERGLYCEMIA, WITHOUT LONG-TERM CURRENT USE OF INSULIN: ICD-10-CM

## 2022-12-29 DIAGNOSIS — E11.65 TYPE 2 DIABETES MELLITUS WITH HYPERGLYCEMIA, WITHOUT LONG-TERM CURRENT USE OF INSULIN: Primary | ICD-10-CM

## 2022-12-29 LAB
ALBUMIN SERPL-MCNC: 4.3 G/DL (ref 3.5–5.2)
ALBUMIN UR-MCNC: 1.6 MG/DL
ALBUMIN/GLOB SERPL: 1.6 G/DL
ALP SERPL-CCNC: 120 U/L (ref 39–117)
ALT SERPL W P-5'-P-CCNC: 19 U/L (ref 1–41)
ANION GAP SERPL CALCULATED.3IONS-SCNC: 8 MMOL/L (ref 5–15)
AST SERPL-CCNC: 22 U/L (ref 1–40)
BASOPHILS # BLD AUTO: 0.06 10*3/MM3 (ref 0–0.2)
BASOPHILS NFR BLD AUTO: 0.9 % (ref 0–1.5)
BILIRUB SERPL-MCNC: 0.7 MG/DL (ref 0–1.2)
BILIRUB UR QL STRIP: NEGATIVE
BUN SERPL-MCNC: 12 MG/DL (ref 8–23)
BUN/CREAT SERPL: 11.3 (ref 7–25)
CALCIUM SPEC-SCNC: 9.5 MG/DL (ref 8.6–10.5)
CHLORIDE SERPL-SCNC: 102 MMOL/L (ref 98–107)
CHOLEST SERPL-MCNC: 231 MG/DL (ref 0–200)
CLARITY UR: CLEAR
CO2 SERPL-SCNC: 29 MMOL/L (ref 22–29)
COLOR UR: YELLOW
CREAT SERPL-MCNC: 1.06 MG/DL (ref 0.76–1.27)
DEPRECATED RDW RBC AUTO: 41.1 FL (ref 37–54)
EGFRCR SERPLBLD CKD-EPI 2021: 79.8 ML/MIN/1.73
EOSINOPHIL # BLD AUTO: 0.34 10*3/MM3 (ref 0–0.4)
EOSINOPHIL NFR BLD AUTO: 5.1 % (ref 0.3–6.2)
ERYTHROCYTE [DISTWIDTH] IN BLOOD BY AUTOMATED COUNT: 12.5 % (ref 12.3–15.4)
GLOBULIN UR ELPH-MCNC: 2.7 GM/DL
GLUCOSE SERPL-MCNC: 149 MG/DL (ref 65–99)
GLUCOSE UR STRIP-MCNC: NEGATIVE MG/DL
HBA1C MFR BLD: 7.1 % (ref 4.8–5.6)
HCT VFR BLD AUTO: 46 % (ref 37.5–51)
HDLC SERPL-MCNC: 41 MG/DL (ref 40–60)
HGB BLD-MCNC: 15.5 G/DL (ref 13–17.7)
HGB UR QL STRIP.AUTO: NEGATIVE
IMM GRANULOCYTES # BLD AUTO: 0.02 10*3/MM3 (ref 0–0.05)
IMM GRANULOCYTES NFR BLD AUTO: 0.3 % (ref 0–0.5)
KETONES UR QL STRIP: NEGATIVE
LDLC SERPL CALC-MCNC: 157 MG/DL (ref 0–100)
LDLC/HDLC SERPL: 3.77 {RATIO}
LEUKOCYTE ESTERASE UR QL STRIP.AUTO: NEGATIVE
LYMPHOCYTES # BLD AUTO: 1.71 10*3/MM3 (ref 0.7–3.1)
LYMPHOCYTES NFR BLD AUTO: 25.8 % (ref 19.6–45.3)
MCH RBC QN AUTO: 30.6 PG (ref 26.6–33)
MCHC RBC AUTO-ENTMCNC: 33.7 G/DL (ref 31.5–35.7)
MCV RBC AUTO: 90.9 FL (ref 79–97)
MONOCYTES # BLD AUTO: 0.63 10*3/MM3 (ref 0.1–0.9)
MONOCYTES NFR BLD AUTO: 9.5 % (ref 5–12)
NEUTROPHILS NFR BLD AUTO: 3.86 10*3/MM3 (ref 1.7–7)
NEUTROPHILS NFR BLD AUTO: 58.4 % (ref 42.7–76)
NITRITE UR QL STRIP: NEGATIVE
NRBC BLD AUTO-RTO: 0 /100 WBC (ref 0–0.2)
PH UR STRIP.AUTO: 6.5 [PH] (ref 5–8)
PLATELET # BLD AUTO: 257 10*3/MM3 (ref 140–450)
PMV BLD AUTO: 9.7 FL (ref 6–12)
POTASSIUM SERPL-SCNC: 5.2 MMOL/L (ref 3.5–5.2)
PROT SERPL-MCNC: 7 G/DL (ref 6–8.5)
PROT UR QL STRIP: NEGATIVE
RBC # BLD AUTO: 5.06 10*6/MM3 (ref 4.14–5.8)
SODIUM SERPL-SCNC: 139 MMOL/L (ref 136–145)
SP GR UR STRIP: 1.03 (ref 1–1.03)
TRIGL SERPL-MCNC: 178 MG/DL (ref 0–150)
UROBILINOGEN UR QL STRIP: NORMAL
VLDLC SERPL-MCNC: 33 MG/DL (ref 5–40)
WBC NRBC COR # BLD: 6.62 10*3/MM3 (ref 3.4–10.8)

## 2022-12-29 PROCEDURE — 36415 COLL VENOUS BLD VENIPUNCTURE: CPT

## 2022-12-29 PROCEDURE — 80053 COMPREHEN METABOLIC PANEL: CPT

## 2022-12-29 PROCEDURE — 83036 HEMOGLOBIN GLYCOSYLATED A1C: CPT

## 2022-12-29 PROCEDURE — 82043 UR ALBUMIN QUANTITATIVE: CPT

## 2022-12-29 PROCEDURE — 81003 URINALYSIS AUTO W/O SCOPE: CPT

## 2022-12-29 PROCEDURE — 80061 LIPID PANEL: CPT

## 2022-12-29 PROCEDURE — 85025 COMPLETE CBC W/AUTO DIFF WBC: CPT

## 2022-12-29 RX ORDER — METFORMIN HYDROCHLORIDE 500 MG/1
500 TABLET, EXTENDED RELEASE ORAL
Qty: 90 TABLET | Refills: 1 | Status: SHIPPED | OUTPATIENT
Start: 2022-12-29

## 2023-01-05 DIAGNOSIS — R60.9 EDEMA, UNSPECIFIED TYPE: ICD-10-CM

## 2023-01-05 RX ORDER — FUROSEMIDE 40 MG/1
40 TABLET ORAL DAILY
Qty: 90 TABLET | Refills: 1 | Status: SHIPPED | OUTPATIENT
Start: 2023-01-05

## 2023-01-10 DIAGNOSIS — M54.50 CHRONIC LOW BACK PAIN, UNSPECIFIED BACK PAIN LATERALITY, UNSPECIFIED WHETHER SCIATICA PRESENT: ICD-10-CM

## 2023-01-10 DIAGNOSIS — G89.29 CHRONIC LOW BACK PAIN, UNSPECIFIED BACK PAIN LATERALITY, UNSPECIFIED WHETHER SCIATICA PRESENT: ICD-10-CM

## 2023-01-11 ENCOUNTER — OFFICE VISIT (OUTPATIENT)
Dept: ORTHOPEDIC SURGERY | Facility: CLINIC | Age: 63
End: 2023-01-11
Payer: COMMERCIAL

## 2023-01-11 VITALS — HEIGHT: 69 IN | BODY MASS INDEX: 35.25 KG/M2 | WEIGHT: 238 LBS

## 2023-01-11 DIAGNOSIS — M17.11 PRIMARY OSTEOARTHRITIS OF RIGHT KNEE: ICD-10-CM

## 2023-01-11 DIAGNOSIS — M25.561 RIGHT KNEE PAIN, UNSPECIFIED CHRONICITY: Primary | ICD-10-CM

## 2023-01-11 PROCEDURE — 99213 OFFICE O/P EST LOW 20 MIN: CPT | Performed by: PHYSICIAN ASSISTANT

## 2023-01-11 PROCEDURE — 20610 DRAIN/INJ JOINT/BURSA W/O US: CPT | Performed by: PHYSICIAN ASSISTANT

## 2023-01-11 RX ORDER — GABAPENTIN 300 MG/1
300 CAPSULE ORAL 3 TIMES DAILY
Qty: 270 CAPSULE | Refills: 0 | Status: SHIPPED | OUTPATIENT
Start: 2023-01-11

## 2023-01-11 RX ORDER — LIDOCAINE HYDROCHLORIDE 10 MG/ML
5 INJECTION, SOLUTION INFILTRATION; PERINEURAL
Status: COMPLETED | OUTPATIENT
Start: 2023-01-11 | End: 2023-01-11

## 2023-01-11 RX ORDER — TRIAMCINOLONE ACETONIDE 40 MG/ML
40 INJECTION, SUSPENSION INTRA-ARTICULAR; INTRAMUSCULAR
Status: COMPLETED | OUTPATIENT
Start: 2023-01-11 | End: 2023-01-11

## 2023-01-11 RX ADMIN — TRIAMCINOLONE ACETONIDE 40 MG: 40 INJECTION, SUSPENSION INTRA-ARTICULAR; INTRAMUSCULAR at 10:05

## 2023-01-11 RX ADMIN — LIDOCAINE HYDROCHLORIDE 5 ML: 10 INJECTION, SOLUTION INFILTRATION; PERINEURAL at 10:05

## 2023-01-11 NOTE — PATIENT INSTRUCTIONS
Right knee steroid injection administered in office today.  Patient advised on 3-month duration between injections.  Can consider viscosupplementation after 6 weeks if needed.  Patient would require insurance prior authorization.    Patient reports history of prediabetes only.  He does take metformin.  Advised on close blood glucose monitoring over the course of the next 24 to 48 hours.    Follow-up as needed.  Call with changes or concerns.

## 2023-01-11 NOTE — PROGRESS NOTES
"Chief Complaint  Follow-up of the Right Knee    Subjective      Idris Park presents to [facility] for follow-up of right knee pain and osteoarthritis, which she has managed conservatively with intermittent steroid injections in the past.  He was last seen in office for this on 9/7/2022 with x-ray revealing advanced, bone-on-bone osteoarthritis.  He received a right knee steroid injection at that time which he reports provided temporary relief.  He is requesting repeat right knee steroid injection in office today.    Objective   Allergies   Allergen Reactions   • Cephalexin Rash       Vital Signs:   Ht 175.3 cm (69\")   Wt 108 kg (238 lb)   BMI 35.15 kg/m²       Physical Exam    Constitutional: Awake, alert. Well nourished appearance.    Integumentary: Warm, dry, intact. No obvious rashes.    HENT: Atraumatic, normocephalic.   Respiratory: Non labored respirations .   Cardiovascular: Intact peripheral pulses.    Psychiatric: Normal mood and affect. A&O X3    Ortho Exam  Right knee: Skin is warm, dry, and intact.  Mild edema.  Tenderness to palpation of medial and lateral joint lines.  Crepitus with ROM.  Full knee extension and flexion to 125 degrees.  Full plantarflexion and dorsiflexion of the ankle.  Sensation intact to light touch.  Distal neurovascular intact.  Patient is fully ambulatory with mildly antalgic gait.    Imaging:  X-Ray Report:  Right knee(s) X-Ray  Indication: Evaluation of right knee pain   AP, Lateral and Standing view(s)  Findings: Bone-on-bone osteoarthritis. No acute fractures or dislocation.   Prior studies available for comparison: no         Large Joint Arthrocentesis  Date/Time: 1/11/2023 10:05 AM  Consent given by: patient  Site marked: site marked  Timeout: Immediately prior to procedure a time out was called to verify the correct patient, procedure, equipment, support staff and site/side marked as required   Supporting Documentation  Indications: pain "   Procedure Details  Location: RIGHT KNEE.  Needle gauge: 21G.  Medications administered: 5 mL lidocaine 1 %; 40 mg triamcinolone acetonide 40 MG/ML  Patient tolerance: patient tolerated the procedure well with no immediate complications              Assessment and Plan   Problem List Items Addressed This Visit    None  Visit Diagnoses     Right knee pain, unspecified chronicity    -  Primary    Relevant Orders    Large Joint Arthrocentesis    Primary osteoarthritis of right knee        Relevant Orders    Large Joint Arthrocentesis          Follow Up   Return if symptoms worsen or fail to improve.  Educated on risk of smoking. Discussed options for smoking cessation.    Patient Instructions   Right knee steroid injection administered in office today.  Patient advised on 3-month duration between injections.  Can consider viscosupplementation after 6 weeks if needed.  Patient would require insurance prior authorization.    Patient reports history of prediabetes only.  He does take metformin.  Advised on close blood glucose monitoring over the course of the next 24 to 48 hours.    Follow-up as needed.  Call with changes or concerns.    Patient was given instructions and counseling regarding his condition or for health maintenance advice. Please see specific information pulled into the AVS if appropriate.

## 2023-01-13 ENCOUNTER — TELEPHONE (OUTPATIENT)
Dept: FAMILY MEDICINE CLINIC | Facility: CLINIC | Age: 63
End: 2023-01-13
Payer: COMMERCIAL

## 2023-01-13 DIAGNOSIS — J30.89 ENVIRONMENTAL AND SEASONAL ALLERGIES: Primary | ICD-10-CM

## 2023-01-13 NOTE — TELEPHONE ENCOUNTER
Having dry hacky cough that is non productive.  Had allergies as a kid and was on allergy shots.  Never had to be on an inhaler.  No allergy symptoms just the cough.  Denies fever or colored drainage..Denies chest tightness.    Doing well and lunds are clear to auscultation and heart regular rate and rhythm.

## 2023-01-19 DIAGNOSIS — G25.81 RESTLESS LEG SYNDROME: ICD-10-CM

## 2023-01-19 RX ORDER — ROPINIROLE 1 MG/1
TABLET, FILM COATED ORAL
Qty: 90 TABLET | Refills: 1 | Status: SHIPPED | OUTPATIENT
Start: 2023-01-19 | End: 2023-01-27

## 2023-01-27 DIAGNOSIS — G25.81 RESTLESS LEG SYNDROME: ICD-10-CM

## 2023-01-27 RX ORDER — ROPINIROLE 1 MG/1
TABLET, FILM COATED ORAL
Qty: 90 TABLET | Refills: 1 | Status: SHIPPED | OUTPATIENT
Start: 2023-01-27

## 2023-03-01 DIAGNOSIS — M19.90 ARTHRITIS: ICD-10-CM

## 2023-05-01 ENCOUNTER — OFFICE VISIT (OUTPATIENT)
Dept: FAMILY MEDICINE CLINIC | Facility: CLINIC | Age: 63
End: 2023-05-01
Payer: COMMERCIAL

## 2023-05-01 VITALS
WEIGHT: 250.6 LBS | OXYGEN SATURATION: 98 % | RESPIRATION RATE: 16 BRPM | HEART RATE: 71 BPM | DIASTOLIC BLOOD PRESSURE: 80 MMHG | TEMPERATURE: 96.6 F | SYSTOLIC BLOOD PRESSURE: 118 MMHG | BODY MASS INDEX: 37.12 KG/M2 | HEIGHT: 69 IN

## 2023-05-01 DIAGNOSIS — G89.29 CHRONIC LOW BACK PAIN, UNSPECIFIED BACK PAIN LATERALITY, UNSPECIFIED WHETHER SCIATICA PRESENT: ICD-10-CM

## 2023-05-01 DIAGNOSIS — R60.9 EDEMA, UNSPECIFIED TYPE: ICD-10-CM

## 2023-05-01 DIAGNOSIS — L98.9 SKIN LESION OF SCALP: ICD-10-CM

## 2023-05-01 DIAGNOSIS — Z79.899 MEDICATION MANAGEMENT: Primary | ICD-10-CM

## 2023-05-01 DIAGNOSIS — E11.65 TYPE 2 DIABETES MELLITUS WITH HYPERGLYCEMIA, WITHOUT LONG-TERM CURRENT USE OF INSULIN: ICD-10-CM

## 2023-05-01 DIAGNOSIS — M54.50 CHRONIC LOW BACK PAIN, UNSPECIFIED BACK PAIN LATERALITY, UNSPECIFIED WHETHER SCIATICA PRESENT: ICD-10-CM

## 2023-05-01 RX ORDER — GABAPENTIN 300 MG/1
300 CAPSULE ORAL 3 TIMES DAILY
Qty: 270 CAPSULE | Refills: 0 | Status: SHIPPED | OUTPATIENT
Start: 2023-05-01

## 2023-05-01 RX ORDER — PREDNISONE 20 MG/1
20 TABLET ORAL DAILY
Qty: 5 TABLET | Refills: 0 | Status: SHIPPED | OUTPATIENT
Start: 2023-05-01

## 2023-05-01 RX ORDER — HYDROCHLOROTHIAZIDE 25 MG/1
25 TABLET ORAL DAILY
Qty: 90 TABLET | Refills: 1 | Status: SHIPPED | OUTPATIENT
Start: 2023-05-01

## 2023-05-01 NOTE — PROGRESS NOTES
Chief Complaint  Suspicious Skin Lesion (On head), Edema (Stopped water pill due to cramps - right worse), Annual Exam, and Hip Pain (Left  - possible sciatica)    Subjective        Idris Park presents to [facility]  History of Present Illness  suspicious skin lesion on his head.  Notes that the lesion will bleed and is dry.    Edema:  Stopped water pills due to cramping.  Right is worse than left.      Hip pain:  Possible sciatica.  States only on left side and when sitting in the car.  Doesn't happen any where else.  Not at home.  Pain in buttocks. Will take some time to recover after getting out of car.  Notes that is radiating down the back of leg.  Hip Pain         The following portions of the patient's history were personally reviewed and updated as appropriate: allergies, current medications, past medical history, past surgical history, past family history, and past social history.     Body mass index is 36.99 kg/m².    Class 2 Severe Obesity (BMI >=35 and <=39.9). Obesity-related health conditions include the following: diabetes mellitus. Obesity is unchanged. BMI is is above average; BMI management plan is completed. We discussed portion control and increasing exercise.      Past History:    Medical History: has a past medical history of Arthritis, COVID-19, Diabetes, Hyperlipidemia, Limb pain, and Limb swelling.     Surgical History: has a past surgical history that includes Joint replacement; Hernia repair (1967); Other surgical history; and Dental surgery.     Family History: family history includes Breast cancer (age of onset: 50) in his mother; Cancer in his mother; Other in his father.     Social History: reports that he has never smoked. He has never used smokeless tobacco. He reports that he does not currently use alcohol. He reports that he does not use drugs.    Allergies: Cephalexin          Current Outpatient Medications:   •  coenzyme Q10 100 MG capsule,  "Take 1 capsule by mouth Daily., Disp: , Rfl:   •  cyclobenzaprine (FLEXERIL) 10 MG tablet, Take 1 tablet by mouth 3 (Three) Times a Day As Needed for Muscle Spasms., Disp: 30 tablet, Rfl: 1  •  etodolac (LODINE) 300 MG capsule, Take 1 capsule by mouth 2 (Two) Times a Day With Meals., Disp: 180 capsule, Rfl: 1  •  gabapentin (NEURONTIN) 300 MG capsule, Take 1 capsule by mouth 3 (Three) Times a Day., Disp: 270 capsule, Rfl: 0  •  metFORMIN ER (GLUCOPHAGE-XR) 500 MG 24 hr tablet, Take 1 tablet by mouth Daily With Breakfast., Disp: 90 tablet, Rfl: 1  •  rOPINIRole (REQUIP) 1 MG tablet, TAKE ONE TABLET BY MOUTH 1 HOUR BEFORE BEDTIME, Disp: 90 tablet, Rfl: 1  •  rosuvastatin (CRESTOR) 5 MG tablet, Take 1 tablet by mouth Daily., Disp: 90 tablet, Rfl: 1  •  hydroCHLOROthiazide (HYDRODIURIL) 25 MG tablet, Take 1 tablet by mouth Daily., Disp: 90 tablet, Rfl: 1  •  predniSONE (DELTASONE) 20 MG tablet, Take 1 tablet by mouth Daily., Disp: 5 tablet, Rfl: 0    Medications Discontinued During This Encounter   Medication Reason   • furosemide (LASIX) 40 MG tablet *Therapy completed   • gabapentin (NEURONTIN) 300 MG capsule Reorder         Review of Systems     Objective         Vitals:    05/01/23 0743   BP: 118/80   BP Location: Right arm   Patient Position: Sitting   Cuff Size: Adult   Pulse: 71   Resp: 16   Temp: 96.6 °F (35.9 °C)   TempSrc: Temporal   SpO2: 98%   Weight: 114 kg (250 lb 9.6 oz)   Height: 175.3 cm (69.02\")     Body mass index is 36.99 kg/m².         Physical Exam  Vitals reviewed.   Constitutional:       Appearance: Normal appearance. He is well-developed.   HENT:      Head: Normocephalic and atraumatic.      Mouth/Throat:      Pharynx: No oropharyngeal exudate.   Eyes:      Conjunctiva/sclera: Conjunctivae normal.      Pupils: Pupils are equal, round, and reactive to light.   Cardiovascular:      Rate and Rhythm: Normal rate and regular rhythm.      Heart sounds: Normal heart sounds. No murmur heard.    No " friction rub. No gallop.   Pulmonary:      Effort: Pulmonary effort is normal.      Breath sounds: Normal breath sounds. No wheezing or rhonchi.   Skin:     General: Skin is warm and dry.             Comments: Dry skin lesion that is raised.  Bleeding at times     Neurological:      Mental Status: He is alert and oriented to person, place, and time.   Psychiatric:         Mood and Affect: Mood and affect normal.         Behavior: Behavior normal.         Thought Content: Thought content normal.         Judgment: Judgment normal.             Result Review :               Assessment and Plan     Diagnoses and all orders for this visit:    1. Medication management (Primary)  -     POC Urine Drug Screen [facility]    2. Chronic low back pain, unspecified back pain laterality, unspecified whether sciatica present  -     gabapentin (NEURONTIN) 300 MG capsule; Take 1 capsule by mouth 3 (Three) Times a Day.  Dispense: 270 capsule; Refill: 0  -     predniSONE (DELTASONE) 20 MG tablet; Take 1 tablet by mouth Daily.  Dispense: 5 tablet; Refill: 0    3. Skin lesion of scalp  -     Ambulatory Referral to Dermatology    4. Edema, unspecified type  -     hydroCHLOROthiazide (HYDRODIURIL) 25 MG tablet; Take 1 tablet by mouth Daily.  Dispense: 90 tablet; Refill: 1    5. Type 2 diabetes mellitus with hyperglycemia, without long-term current use of insulin  -     Comprehensive Metabolic Panel; Future  -     Hemoglobin A1c; Future  -     Lipid Panel With / Chol / HDL Ratio; Future  -     Urinalysis With Culture If Indicated -; Future  -     MicroAlbumin, Urine, Random - Urine, Clean Catch; Future      Will try chiropractic for back and if not helping offered pain management.  After review of last MRI.        Follow Up     Return in about 6 months (around 11/1/2023).    Patient was given instructions and counseling regarding his condition or for health maintenance advice. Please see specific information pulled into the AVS if  appropriate.

## 2023-06-02 ENCOUNTER — LAB (OUTPATIENT)
Dept: LAB | Facility: HOSPITAL | Age: 63
End: 2023-06-02
Payer: COMMERCIAL

## 2023-06-02 DIAGNOSIS — E11.65 TYPE 2 DIABETES MELLITUS WITH HYPERGLYCEMIA, WITHOUT LONG-TERM CURRENT USE OF INSULIN: ICD-10-CM

## 2023-06-02 LAB
ALBUMIN SERPL-MCNC: 4.1 G/DL (ref 3.5–5.2)
ALBUMIN UR-MCNC: <1.2 MG/DL
ALBUMIN/GLOB SERPL: 1.7 G/DL
ALP SERPL-CCNC: 106 U/L (ref 39–117)
ALT SERPL W P-5'-P-CCNC: 24 U/L (ref 1–41)
ANION GAP SERPL CALCULATED.3IONS-SCNC: 11.3 MMOL/L (ref 5–15)
AST SERPL-CCNC: 23 U/L (ref 1–40)
BILIRUB SERPL-MCNC: 0.4 MG/DL (ref 0–1.2)
BILIRUB UR QL STRIP: NEGATIVE
BUN SERPL-MCNC: 11 MG/DL (ref 8–23)
BUN/CREAT SERPL: 11.8 (ref 7–25)
CALCIUM SPEC-SCNC: 9.1 MG/DL (ref 8.6–10.5)
CHLORIDE SERPL-SCNC: 101 MMOL/L (ref 98–107)
CHOLEST SERPL-MCNC: 197 MG/DL (ref 0–200)
CLARITY UR: CLEAR
CO2 SERPL-SCNC: 25.7 MMOL/L (ref 22–29)
COLOR UR: YELLOW
CREAT SERPL-MCNC: 0.93 MG/DL (ref 0.76–1.27)
EGFRCR SERPLBLD CKD-EPI 2021: 92.8 ML/MIN/1.73
GLOBULIN UR ELPH-MCNC: 2.4 GM/DL
GLUCOSE SERPL-MCNC: 168 MG/DL (ref 65–99)
GLUCOSE UR STRIP-MCNC: ABNORMAL MG/DL
HBA1C MFR BLD: 7.6 % (ref 4.8–5.6)
HDLC SERPL QL: 5.47
HDLC SERPL-MCNC: 36 MG/DL (ref 40–60)
HGB UR QL STRIP.AUTO: NEGATIVE
KETONES UR QL STRIP: NEGATIVE
LDLC SERPL CALC-MCNC: 131 MG/DL (ref 0–100)
LEUKOCYTE ESTERASE UR QL STRIP.AUTO: NEGATIVE
NITRITE UR QL STRIP: NEGATIVE
PH UR STRIP.AUTO: 6.5 [PH] (ref 5–8)
POTASSIUM SERPL-SCNC: 4.7 MMOL/L (ref 3.5–5.2)
PROT SERPL-MCNC: 6.5 G/DL (ref 6–8.5)
PROT UR QL STRIP: NEGATIVE
SODIUM SERPL-SCNC: 138 MMOL/L (ref 136–145)
SP GR UR STRIP: 1.02 (ref 1–1.03)
TRIGL SERPL-MCNC: 169 MG/DL (ref 0–150)
UROBILINOGEN UR QL STRIP: ABNORMAL
VLDLC SERPL-MCNC: 30 MG/DL (ref 5–40)

## 2023-06-02 PROCEDURE — 80053 COMPREHEN METABOLIC PANEL: CPT

## 2023-06-02 PROCEDURE — 36415 COLL VENOUS BLD VENIPUNCTURE: CPT

## 2023-06-02 PROCEDURE — 82043 UR ALBUMIN QUANTITATIVE: CPT

## 2023-06-02 PROCEDURE — 81003 URINALYSIS AUTO W/O SCOPE: CPT

## 2023-06-02 PROCEDURE — 80061 LIPID PANEL: CPT

## 2023-06-02 PROCEDURE — 83036 HEMOGLOBIN GLYCOSYLATED A1C: CPT

## 2023-06-19 DIAGNOSIS — E11.65 TYPE 2 DIABETES MELLITUS WITH HYPERGLYCEMIA, WITHOUT LONG-TERM CURRENT USE OF INSULIN: Primary | ICD-10-CM

## 2023-08-28 DIAGNOSIS — G89.29 CHRONIC LOW BACK PAIN, UNSPECIFIED BACK PAIN LATERALITY, UNSPECIFIED WHETHER SCIATICA PRESENT: ICD-10-CM

## 2023-08-28 DIAGNOSIS — M79.644 THUMB PAIN, RIGHT: ICD-10-CM

## 2023-08-28 DIAGNOSIS — M79.645 THUMB PAIN, LEFT: Primary | ICD-10-CM

## 2023-08-28 DIAGNOSIS — M54.50 CHRONIC LOW BACK PAIN, UNSPECIFIED BACK PAIN LATERALITY, UNSPECIFIED WHETHER SCIATICA PRESENT: ICD-10-CM

## 2023-08-28 RX ORDER — GABAPENTIN 300 MG/1
300 CAPSULE ORAL 3 TIMES DAILY
Qty: 270 CAPSULE | Refills: 0 | Status: SHIPPED | OUTPATIENT
Start: 2023-08-28 | End: 2023-08-30

## 2023-08-28 NOTE — TELEPHONE ENCOUNTER
PT CAME INTO THE OFFICE THIS MORNING SAYING THAT HE NEEDS A REFILL ON GABAPENTIN. PT WANTING AN ORDER FOR ORTHO FOR HIS HAND. PT WANTING TO [facility] TO [facility]. PT SAID THAT HE WAS PUT ON OZEMPIC. PRESCRIPTION WAS SENT TO [facility] AND WAS SENT BACK TO OUR OFFICE. PATIENT HASN'T HEARD ANYTHING ELSE ABOUT THIS MEDICATION.

## 2023-08-30 DIAGNOSIS — M54.50 CHRONIC LOW BACK PAIN, UNSPECIFIED BACK PAIN LATERALITY, UNSPECIFIED WHETHER SCIATICA PRESENT: ICD-10-CM

## 2023-08-30 DIAGNOSIS — G89.29 CHRONIC LOW BACK PAIN, UNSPECIFIED BACK PAIN LATERALITY, UNSPECIFIED WHETHER SCIATICA PRESENT: ICD-10-CM

## 2023-08-30 RX ORDER — GABAPENTIN 300 MG/1
CAPSULE ORAL
Qty: 270 CAPSULE | Refills: 0 | Status: SHIPPED | OUTPATIENT
Start: 2023-08-30

## 2023-09-09 DIAGNOSIS — F33.1 MODERATE EPISODE OF RECURRENT MAJOR DEPRESSIVE DISORDER: ICD-10-CM

## 2023-09-11 RX ORDER — DULOXETIN HYDROCHLORIDE 30 MG/1
30 CAPSULE, DELAYED RELEASE ORAL DAILY
Qty: 30 CAPSULE | Refills: 1 | Status: SHIPPED | OUTPATIENT
Start: 2023-09-11

## 2023-09-28 DIAGNOSIS — M19.90 ARTHRITIS: ICD-10-CM

## 2023-11-08 DIAGNOSIS — R60.9 EDEMA, UNSPECIFIED TYPE: ICD-10-CM

## 2023-11-08 RX ORDER — HYDROCHLOROTHIAZIDE 25 MG/1
25 TABLET ORAL DAILY
Qty: 90 TABLET | Refills: 1 | Status: SHIPPED | OUTPATIENT
Start: 2023-11-08

## 2023-11-11 DIAGNOSIS — F33.1 MODERATE EPISODE OF RECURRENT MAJOR DEPRESSIVE DISORDER: ICD-10-CM

## 2023-11-13 RX ORDER — DULOXETIN HYDROCHLORIDE 30 MG/1
30 CAPSULE, DELAYED RELEASE ORAL DAILY
Qty: 30 CAPSULE | Refills: 1 | Status: SHIPPED | OUTPATIENT
Start: 2023-11-13

## 2023-12-04 DIAGNOSIS — E11.65 TYPE 2 DIABETES MELLITUS WITH HYPERGLYCEMIA, WITHOUT LONG-TERM CURRENT USE OF INSULIN: ICD-10-CM

## 2023-12-05 ENCOUNTER — TELEPHONE (OUTPATIENT)
Dept: FAMILY MEDICINE CLINIC | Facility: CLINIC | Age: 63
End: 2023-12-05
Payer: COMMERCIAL

## 2023-12-05 DIAGNOSIS — E11.65 TYPE 2 DIABETES MELLITUS WITH HYPERGLYCEMIA, WITHOUT LONG-TERM CURRENT USE OF INSULIN: Primary | ICD-10-CM

## 2023-12-05 NOTE — TELEPHONE ENCOUNTER
Tasha from [facility] requesting clarification on Ozempic prescription.    Tasha states all Ozempic pens were converted to a 3ml dosing pen. She is requesting the script be resent for 2mg/3ml.    Tasha is needing to clarify is patient was staying on .25mg dose or increasing to .50mg.    Tasha is requesting a 90 day script

## 2023-12-18 DIAGNOSIS — M54.50 CHRONIC LOW BACK PAIN, UNSPECIFIED BACK PAIN LATERALITY, UNSPECIFIED WHETHER SCIATICA PRESENT: ICD-10-CM

## 2023-12-18 DIAGNOSIS — G89.29 CHRONIC LOW BACK PAIN, UNSPECIFIED BACK PAIN LATERALITY, UNSPECIFIED WHETHER SCIATICA PRESENT: ICD-10-CM

## 2023-12-18 RX ORDER — GABAPENTIN 300 MG/1
CAPSULE ORAL
Qty: 270 CAPSULE | Refills: 0 | Status: SHIPPED | OUTPATIENT
Start: 2023-12-18

## 2024-01-03 ENCOUNTER — TELEPHONE (OUTPATIENT)
Dept: FAMILY MEDICINE CLINIC | Facility: CLINIC | Age: 64
End: 2024-01-03

## 2024-01-03 NOTE — TELEPHONE ENCOUNTER
LVMTCB    Patient missed their appointment scheduled on 1/3/2024 with Dae aYn.     Would patient like to be rescheduled?    No show letter sent to patient either via mychart or mail.     HUB TO SHARE AND RELAY

## 2024-01-04 ENCOUNTER — TELEPHONE (OUTPATIENT)
Dept: FAMILY MEDICINE CLINIC | Facility: CLINIC | Age: 64
End: 2024-01-04
Payer: COMMERCIAL

## 2024-01-04 DIAGNOSIS — J06.9 ACUTE URI: Primary | ICD-10-CM

## 2024-01-04 RX ORDER — DOXYCYCLINE HYCLATE 100 MG/1
100 CAPSULE ORAL 2 TIMES DAILY
Qty: 20 CAPSULE | Refills: 0 | Status: SHIPPED | OUTPATIENT
Start: 2024-01-04

## 2024-01-04 NOTE — TELEPHONE ENCOUNTER
Congestion for a week with yellow drainage.  Notes that he is congestion and drainage.    HEENT:  ears clear and no red TM  Throat red without shift of uvula  No cervical lymphadenopathy    Lungs:  CTA    Heart:  clear to auscultation.

## 2024-01-30 DIAGNOSIS — F33.1 MODERATE EPISODE OF RECURRENT MAJOR DEPRESSIVE DISORDER: ICD-10-CM

## 2024-01-30 RX ORDER — DULOXETIN HYDROCHLORIDE 30 MG/1
30 CAPSULE, DELAYED RELEASE ORAL DAILY
Qty: 30 CAPSULE | Refills: 1 | Status: SHIPPED | OUTPATIENT
Start: 2024-01-30

## 2024-02-05 DIAGNOSIS — E11.65 TYPE 2 DIABETES MELLITUS WITH HYPERGLYCEMIA, WITHOUT LONG-TERM CURRENT USE OF INSULIN: ICD-10-CM

## 2024-02-05 DIAGNOSIS — M25.512 ACUTE PAIN OF LEFT SHOULDER: Primary | ICD-10-CM

## 2024-02-12 ENCOUNTER — OFFICE VISIT (OUTPATIENT)
Dept: ORTHOPEDIC SURGERY | Facility: CLINIC | Age: 64
End: 2024-02-12
Payer: COMMERCIAL

## 2024-02-12 VITALS
OXYGEN SATURATION: 96 % | DIASTOLIC BLOOD PRESSURE: 80 MMHG | HEIGHT: 69 IN | HEART RATE: 84 BPM | SYSTOLIC BLOOD PRESSURE: 151 MMHG | WEIGHT: 242 LBS | BODY MASS INDEX: 35.84 KG/M2

## 2024-02-12 DIAGNOSIS — M25.512 LEFT SHOULDER PAIN, UNSPECIFIED CHRONICITY: Primary | ICD-10-CM

## 2024-02-12 DIAGNOSIS — M75.52 BURSITIS OF LEFT SHOULDER: ICD-10-CM

## 2024-02-12 PROCEDURE — 99203 OFFICE O/P NEW LOW 30 MIN: CPT | Performed by: ORTHOPAEDIC SURGERY

## 2024-02-12 PROCEDURE — 20610 DRAIN/INJ JOINT/BURSA W/O US: CPT | Performed by: ORTHOPAEDIC SURGERY

## 2024-02-12 RX ORDER — TRIAMCINOLONE ACETONIDE 40 MG/ML
40 INJECTION, SUSPENSION INTRA-ARTICULAR; INTRAMUSCULAR
Status: COMPLETED | OUTPATIENT
Start: 2024-02-12 | End: 2024-02-12

## 2024-02-12 RX ORDER — LIDOCAINE HYDROCHLORIDE 10 MG/ML
5 INJECTION, SOLUTION INFILTRATION; PERINEURAL
Status: COMPLETED | OUTPATIENT
Start: 2024-02-12 | End: 2024-02-12

## 2024-02-12 RX ADMIN — LIDOCAINE HYDROCHLORIDE 5 ML: 10 INJECTION, SOLUTION INFILTRATION; PERINEURAL at 09:12

## 2024-02-12 RX ADMIN — TRIAMCINOLONE ACETONIDE 40 MG: 40 INJECTION, SUSPENSION INTRA-ARTICULAR; INTRAMUSCULAR at 09:12

## 2024-03-30 DIAGNOSIS — F33.1 MODERATE EPISODE OF RECURRENT MAJOR DEPRESSIVE DISORDER: ICD-10-CM

## 2024-04-01 RX ORDER — DULOXETIN HYDROCHLORIDE 30 MG/1
30 CAPSULE, DELAYED RELEASE ORAL DAILY
Qty: 30 CAPSULE | Refills: 1 | Status: SHIPPED | OUTPATIENT
Start: 2024-04-01

## 2024-04-05 DIAGNOSIS — G89.29 CHRONIC LOW BACK PAIN, UNSPECIFIED BACK PAIN LATERALITY, UNSPECIFIED WHETHER SCIATICA PRESENT: ICD-10-CM

## 2024-04-05 DIAGNOSIS — M19.90 ARTHRITIS: ICD-10-CM

## 2024-04-05 DIAGNOSIS — M54.50 CHRONIC LOW BACK PAIN, UNSPECIFIED BACK PAIN LATERALITY, UNSPECIFIED WHETHER SCIATICA PRESENT: ICD-10-CM

## 2024-04-05 RX ORDER — GABAPENTIN 300 MG/1
CAPSULE ORAL
Qty: 270 CAPSULE | Refills: 0 | Status: SHIPPED | OUTPATIENT
Start: 2024-04-05

## 2024-04-10 DIAGNOSIS — E11.65 TYPE 2 DIABETES MELLITUS WITH HYPERGLYCEMIA, WITHOUT LONG-TERM CURRENT USE OF INSULIN: Primary | ICD-10-CM

## 2024-04-19 ENCOUNTER — TELEPHONE (OUTPATIENT)
Dept: FAMILY MEDICINE CLINIC | Facility: CLINIC | Age: 64
End: 2024-04-19
Payer: COMMERCIAL

## 2024-05-29 ENCOUNTER — LAB (OUTPATIENT)
Dept: LAB | Facility: HOSPITAL | Age: 64
End: 2024-05-29
Payer: COMMERCIAL

## 2024-05-29 ENCOUNTER — OFFICE VISIT (OUTPATIENT)
Dept: FAMILY MEDICINE CLINIC | Facility: CLINIC | Age: 64
End: 2024-05-29
Payer: COMMERCIAL

## 2024-05-29 VITALS
OXYGEN SATURATION: 95 % | WEIGHT: 241 LBS | RESPIRATION RATE: 16 BRPM | HEIGHT: 69 IN | SYSTOLIC BLOOD PRESSURE: 118 MMHG | TEMPERATURE: 96.6 F | HEART RATE: 68 BPM | BODY MASS INDEX: 35.7 KG/M2 | DIASTOLIC BLOOD PRESSURE: 80 MMHG

## 2024-05-29 DIAGNOSIS — E11.65 TYPE 2 DIABETES MELLITUS WITH HYPERGLYCEMIA, WITHOUT LONG-TERM CURRENT USE OF INSULIN: ICD-10-CM

## 2024-05-29 DIAGNOSIS — R53.83 FATIGUE, UNSPECIFIED TYPE: ICD-10-CM

## 2024-05-29 DIAGNOSIS — Z12.5 SCREENING FOR PROSTATE CANCER: ICD-10-CM

## 2024-05-29 DIAGNOSIS — Z51.81 MEDICATION MONITORING ENCOUNTER: ICD-10-CM

## 2024-05-29 DIAGNOSIS — R68.82 DECREASED LIBIDO: ICD-10-CM

## 2024-05-29 DIAGNOSIS — H66.90 ACUTE OTITIS MEDIA, UNSPECIFIED OTITIS MEDIA TYPE: ICD-10-CM

## 2024-05-29 DIAGNOSIS — L24.9 IRRITANT DERMATITIS: ICD-10-CM

## 2024-05-29 DIAGNOSIS — Z12.11 SCREENING FOR COLON CANCER: Primary | ICD-10-CM

## 2024-05-29 LAB
ALBUMIN SERPL-MCNC: 4.3 G/DL (ref 3.5–5.2)
ALBUMIN/GLOB SERPL: 1.6 G/DL
ALP SERPL-CCNC: 120 U/L (ref 39–117)
ALT SERPL W P-5'-P-CCNC: 20 U/L (ref 1–41)
AMPHET+METHAMPHET UR QL: NEGATIVE
AMPHETAMINE INTERNAL CONTROL: NORMAL
AMPHETAMINES UR QL: NEGATIVE
ANION GAP SERPL CALCULATED.3IONS-SCNC: 12.8 MMOL/L (ref 5–15)
AST SERPL-CCNC: 20 U/L (ref 1–40)
BARBITURATE INTERNAL CONTROL: NORMAL
BARBITURATES UR QL SCN: NEGATIVE
BASOPHILS # BLD AUTO: 0.05 10*3/MM3 (ref 0–0.2)
BASOPHILS NFR BLD AUTO: 0.7 % (ref 0–1.5)
BENZODIAZ UR QL SCN: NEGATIVE
BENZODIAZEPINE INTERNAL CONTROL: NORMAL
BILIRUB SERPL-MCNC: 0.4 MG/DL (ref 0–1.2)
BUN SERPL-MCNC: 13 MG/DL (ref 8–23)
BUN/CREAT SERPL: 12 (ref 7–25)
BUPRENORPHINE INTERNAL CONTROL: NORMAL
BUPRENORPHINE SERPL-MCNC: NEGATIVE NG/ML
CALCIUM SPEC-SCNC: 9.8 MG/DL (ref 8.6–10.5)
CANNABINOIDS SERPL QL: NEGATIVE
CHLORIDE SERPL-SCNC: 102 MMOL/L (ref 98–107)
CHOLEST SERPL-MCNC: 204 MG/DL (ref 0–200)
CO2 SERPL-SCNC: 27.2 MMOL/L (ref 22–29)
COCAINE INTERNAL CONTROL: NORMAL
COCAINE UR QL: NEGATIVE
CREAT SERPL-MCNC: 1.08 MG/DL (ref 0.76–1.27)
DEPRECATED RDW RBC AUTO: 42.3 FL (ref 37–54)
EGFRCR SERPLBLD CKD-EPI 2021: 77.1 ML/MIN/1.73
EOSINOPHIL # BLD AUTO: 0.57 10*3/MM3 (ref 0–0.4)
EOSINOPHIL NFR BLD AUTO: 8 % (ref 0.3–6.2)
ERYTHROCYTE [DISTWIDTH] IN BLOOD BY AUTOMATED COUNT: 13 % (ref 12.3–15.4)
EXPIRATION DATE: ABNORMAL
EXPIRATION DATE: NORMAL
GLOBULIN UR ELPH-MCNC: 2.7 GM/DL
GLUCOSE SERPL-MCNC: 111 MG/DL (ref 65–99)
HBA1C MFR BLD: 6.3 % (ref 4.5–5.7)
HCT VFR BLD AUTO: 43.5 % (ref 37.5–51)
HDLC SERPL QL: 5.37
HDLC SERPL-MCNC: 38 MG/DL (ref 40–60)
HGB BLD-MCNC: 14.6 G/DL (ref 13–17.7)
IMM GRANULOCYTES # BLD AUTO: 0.02 10*3/MM3 (ref 0–0.05)
IMM GRANULOCYTES NFR BLD AUTO: 0.3 % (ref 0–0.5)
LDLC SERPL CALC-MCNC: 120 MG/DL (ref 0–100)
LYMPHOCYTES # BLD AUTO: 1.39 10*3/MM3 (ref 0.7–3.1)
LYMPHOCYTES NFR BLD AUTO: 19.6 % (ref 19.6–45.3)
Lab: ABNORMAL
Lab: NORMAL
MCH RBC QN AUTO: 30.2 PG (ref 26.6–33)
MCHC RBC AUTO-ENTMCNC: 33.6 G/DL (ref 31.5–35.7)
MCV RBC AUTO: 90.1 FL (ref 79–97)
MDMA (ECSTASY) INTERNAL CONTROL: NORMAL
MDMA UR QL SCN: NEGATIVE
METHADONE INTERNAL CONTROL: NORMAL
METHADONE UR QL SCN: NEGATIVE
METHAMPHETAMINE INTERNAL CONTROL: NORMAL
MONOCYTES # BLD AUTO: 0.69 10*3/MM3 (ref 0.1–0.9)
MONOCYTES NFR BLD AUTO: 9.7 % (ref 5–12)
MORPHINE INTERNAL CONTROL: NORMAL
MORPHINE/OPIATES SCREEN, URINE: NEGATIVE
NEUTROPHILS NFR BLD AUTO: 4.38 10*3/MM3 (ref 1.7–7)
NEUTROPHILS NFR BLD AUTO: 61.7 % (ref 42.7–76)
NRBC BLD AUTO-RTO: 0 /100 WBC (ref 0–0.2)
OXYCODONE INTERNAL CONTROL: NORMAL
OXYCODONE UR QL SCN: NEGATIVE
PCP UR QL SCN: NEGATIVE
PHENCYCLIDINE INTERNAL CONTROL: NORMAL
PLATELET # BLD AUTO: 254 10*3/MM3 (ref 140–450)
PMV BLD AUTO: 10.1 FL (ref 6–12)
POTASSIUM SERPL-SCNC: 5.4 MMOL/L (ref 3.5–5.2)
PROT SERPL-MCNC: 7 G/DL (ref 6–8.5)
PSA SERPL-MCNC: 3.43 NG/ML (ref 0–4)
RBC # BLD AUTO: 4.83 10*6/MM3 (ref 4.14–5.8)
SODIUM SERPL-SCNC: 142 MMOL/L (ref 136–145)
TESTOST SERPL-MCNC: 366 NG/DL (ref 193–740)
THC INTERNAL CONTROL: NORMAL
TRIGL SERPL-MCNC: 264 MG/DL (ref 0–150)
TSH SERPL DL<=0.05 MIU/L-ACNC: 2.2 UIU/ML (ref 0.27–4.2)
VLDLC SERPL-MCNC: 46 MG/DL (ref 5–40)
WBC NRBC COR # BLD AUTO: 7.1 10*3/MM3 (ref 3.4–10.8)

## 2024-05-29 PROCEDURE — 99214 OFFICE O/P EST MOD 30 MIN: CPT | Performed by: NURSE PRACTITIONER

## 2024-05-29 PROCEDURE — 36415 COLL VENOUS BLD VENIPUNCTURE: CPT

## 2024-05-29 PROCEDURE — 84403 ASSAY OF TOTAL TESTOSTERONE: CPT

## 2024-05-29 PROCEDURE — 83036 HEMOGLOBIN GLYCOSYLATED A1C: CPT | Performed by: NURSE PRACTITIONER

## 2024-05-29 PROCEDURE — 80305 DRUG TEST PRSMV DIR OPT OBS: CPT | Performed by: NURSE PRACTITIONER

## 2024-05-29 PROCEDURE — 80050 GENERAL HEALTH PANEL: CPT

## 2024-05-29 PROCEDURE — G0103 PSA SCREENING: HCPCS

## 2024-05-29 PROCEDURE — 80061 LIPID PANEL: CPT

## 2024-05-29 RX ORDER — SULFAMETHOXAZOLE AND TRIMETHOPRIM 800; 160 MG/1; MG/1
1 TABLET ORAL 2 TIMES DAILY
Qty: 20 TABLET | Refills: 0 | Status: SHIPPED | OUTPATIENT
Start: 2024-05-29 | End: 2024-05-31 | Stop reason: SDUPTHER

## 2024-05-29 NOTE — PROGRESS NOTES
Chief Complaint  Ear Fullness (right)    Rachel Park presents to [facility]  History of Present Illness  A couple of weeks ago had some congestion and went to urgent care and was given amoxil, claritin, and flonase.  Still hearing crackling in ear and some cough and congestion.  Still taking claritin and flonase.    Rash on right forearm.  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  Ear Fullness   Pertinent negatives include no coughing.       The following portions of the patient's history were personally reviewed and updated as appropriate: allergies, current medications, past medical history, past surgical history, past family history, and past social history.     Body mass index is 35.57 kg/m².           Past History:    Medical History: has a past medical history of Arthritis, COVID-19, Diabetes, Hyperlipidemia, Limb pain, and Limb swelling.     Surgical History: has a past surgical history that includes Joint replacement; Hernia repair (1967); Other surgical history; and Dental surgery.     Family History: family history includes Breast cancer (age of onset: 50) in his mother; Cancer in his mother; Other in his father.     Social History: reports that he has never smoked. He has never used smokeless tobacco. He reports that he does not currently use alcohol. He reports that he does not use drugs.    Allergies: Cephalexin          Current Outpatient Medications:     cyclobenzaprine (FLEXERIL) 10 MG tablet, Take 1 tablet by mouth 3 (Three) Times a Day As Needed for Muscle Spasms., Disp: 30 tablet, Rfl: 1    DULoxetine (CYMBALTA) 30 MG capsule, TAKE 1 CAPSULE BY MOUTH DAILY, Disp: 30 capsule, Rfl: 1    etodolac (LODINE) 300 MG capsule, TAKE 1 CAPSULE BY MOUTH TWICE A DAY WITH A MEAL, Disp: 180 capsule, Rfl: 1    gabapentin (NEURONTIN) 300 MG capsule, TAKE ONE CAPSULE BY MOUTH THREE TIMES A DAY, Disp:  "270 capsule, Rfl: 0    Semaglutide,0.25 or 0.5MG/DOS, (OZEMPIC) 2 MG/3ML solution pen-injector, Inject 0.5 mg under the skin into the appropriate area as directed 1 (One) Time Per Week., Disp: 6 mL, Rfl: 0    coenzyme Q10 100 MG capsule, Take 1 capsule by mouth Daily. (Patient not taking: Reported on 2/12/2024), Disp: , Rfl:     hydroCHLOROthiazide (HYDRODIURIL) 25 MG tablet, TAKE ONE TABLET BY MOUTH DAILY (Patient not taking: Reported on 2/12/2024), Disp: 90 tablet, Rfl: 1    rOPINIRole (REQUIP) 1 MG tablet, TAKE ONE TABLET BY MOUTH 1 HOUR BEFORE BEDTIME (Patient not taking: Reported on 2/12/2024), Disp: 90 tablet, Rfl: 1    rosuvastatin (CRESTOR) 5 MG tablet, Take 1 tablet by mouth Daily. (Patient not taking: Reported on 2/12/2024), Disp: 90 tablet, Rfl: 1    sulfamethoxazole-trimethoprim (Bactrim DS) 800-160 MG per tablet, Take 1 tablet by mouth 2 (Two) Times a Day., Disp: 20 tablet, Rfl: 0    Medications Discontinued During This Encounter   Medication Reason    predniSONE (DELTASONE) 20 MG tablet *Therapy completed    doxycycline (VIBRAMYCIN) 100 MG capsule *Therapy completed         Review of Systems   Constitutional:  Negative for fever.   Respiratory:  Negative for cough and shortness of breath.    Cardiovascular:  Negative for chest pain, palpitations and leg swelling.   Neurological:  Negative for dizziness, weakness, numbness and headache.        Objective         Vitals:    05/29/24 0746   BP: 118/80   BP Location: Right arm   Patient Position: Sitting   Cuff Size: Adult   Pulse: 68   Resp: 16   Temp: 96.6 °F (35.9 °C)   TempSrc: Temporal   SpO2: 95%   Weight: 109 kg (241 lb)   Height: 175.3 cm (69.02\")     Body mass index is 35.57 kg/m².         Physical Exam  Vitals reviewed.   Constitutional:       Appearance: Normal appearance. He is well-developed.   HENT:      Head: Normocephalic and atraumatic.      Right Ear: Tympanic membrane is injected and erythematous.      Left Ear: Tympanic membrane normal. "      Mouth/Throat:      Pharynx: No oropharyngeal exudate.   Eyes:      Conjunctiva/sclera: Conjunctivae normal.      Pupils: Pupils are equal, round, and reactive to light.   Cardiovascular:      Rate and Rhythm: Normal rate and regular rhythm.      Heart sounds: Normal heart sounds. No murmur heard.     No friction rub. No gallop.   Pulmonary:      Effort: Pulmonary effort is normal.      Breath sounds: Normal breath sounds. No wheezing or rhonchi.   Skin:     General: Skin is warm and dry.      Comments: Patched urticareial raed rash with dry skin on top.   Neurological:      Mental Status: He is alert and oriented to person, place, and time.   Psychiatric:         Mood and Affect: Mood and affect normal.         Behavior: Behavior normal.         Thought Content: Thought content normal.         Judgment: Judgment normal.             Result Review :               Assessment and Plan     Diagnoses and all orders for this visit:    1. Screening for colon cancer (Primary)  -     Cologuard - Stool, Per Rectum; Future    2. Type 2 diabetes mellitus with hyperglycemia, without long-term current use of insulin  -     POC Glycosylated Hemoglobin (Hb A1C)  -     CBC & Differential; Future  -     Comprehensive Metabolic Panel; Future  -     Hemoglobin A1c; Future  -     Urinalysis With Culture If Indicated -; Future  -     Lipid Panel; Future  -     Microalbumin / Creatinine Urine Ratio - Urine, Clean Catch; Future    3. Screening for prostate cancer  -     PSA SCREENING; Future    4. Acute otitis media, unspecified otitis media type  -     sulfamethoxazole-trimethoprim (Bactrim DS) 800-160 MG per tablet; Take 1 tablet by mouth 2 (Two) Times a Day.  Dispense: 20 tablet; Refill: 0    5. Irritant dermatitis  Comments:  will try to use vasiline to arm to see if helps.              Follow Up     Return in about 6 months (around 11/29/2024).    Patient was given instructions and counseling regarding his condition or for health  maintenance advice. Please see specific information pulled into the AVS if appropriate.

## 2024-05-30 DIAGNOSIS — E11.65 TYPE 2 DIABETES MELLITUS WITH HYPERGLYCEMIA, WITHOUT LONG-TERM CURRENT USE OF INSULIN: ICD-10-CM

## 2024-05-30 DIAGNOSIS — E87.5 HYPERKALEMIA: Primary | ICD-10-CM

## 2024-05-30 RX ORDER — SEMAGLUTIDE 0.68 MG/ML
INJECTION, SOLUTION SUBCUTANEOUS
Qty: 3 ML | Refills: 1 | Status: SHIPPED | OUTPATIENT
Start: 2024-05-30

## 2024-05-31 DIAGNOSIS — H66.90 ACUTE OTITIS MEDIA, UNSPECIFIED OTITIS MEDIA TYPE: ICD-10-CM

## 2024-05-31 RX ORDER — SULFAMETHOXAZOLE AND TRIMETHOPRIM 800; 160 MG/1; MG/1
1 TABLET ORAL 2 TIMES DAILY
Qty: 20 TABLET | Refills: 0 | Status: SHIPPED | OUTPATIENT
Start: 2024-05-31

## 2024-06-03 ENCOUNTER — TELEPHONE (OUTPATIENT)
Dept: FAMILY MEDICINE CLINIC | Facility: CLINIC | Age: 64
End: 2024-06-03
Payer: COMMERCIAL

## 2024-06-03 RX ORDER — AZITHROMYCIN 250 MG/1
TABLET, FILM COATED ORAL
Qty: 6 TABLET | Refills: 0 | Status: SHIPPED | OUTPATIENT
Start: 2024-06-03 | End: 2024-06-04 | Stop reason: SDUPTHER

## 2024-06-03 NOTE — TELEPHONE ENCOUNTER
Pt stated he stopped the Bactrim on Saturday due to having coughing fits and throwing it up.  Please  advise.

## 2024-06-03 NOTE — TELEPHONE ENCOUNTER
----- Message from Regla Adames RN sent at 8/23/2023 10:42 AM CDT -----  Regarding: DEXA due about 11/01/2023  Provider:  Dr. Cardenas  Type of imaging:  DEXA  Due for imaging:  About 11/01/2023  Use port if applicable:  NA (if imaging is scheduled at the clinic, port access+lab need to be scheduled at [facility])  Specific labs needed:  CBC/CMP (schedule labs just prior to imaging)  Contrast/gadolinium allergy:  NA (if YES, imaging has to be done at THE HOSPITAL and use premedication protocol)  Need follow up appt a few days later:  Yes  Limitations: Deaf or hard of hearing?, Unable to stand without help?, Need an ?, Use a wheelchair or an aid to walk?  Speaks English but writes Hmong    Preferences~  Location:  [place]  Time of day:  Any  Day of the week:  Any           Informed pt

## 2024-06-03 NOTE — TELEPHONE ENCOUNTER
Left message letting pt know to continue medication and start an antihistamine to help dry up the congestion.

## 2024-06-03 NOTE — TELEPHONE ENCOUNTER
Patient was seen in office on 5/29/24. Patient states he started medication that Friday due to pharmacy mix up. Patient states since starting medication his cough and congestion has gotten worse. Patient requesting to know if something else can be prescribed.

## 2024-06-04 RX ORDER — AZITHROMYCIN 250 MG/1
TABLET, FILM COATED ORAL
Qty: 6 TABLET | Refills: 0 | Status: SHIPPED | OUTPATIENT
Start: 2024-06-04

## 2024-06-23 DIAGNOSIS — F33.1 MODERATE EPISODE OF RECURRENT MAJOR DEPRESSIVE DISORDER: ICD-10-CM

## 2024-06-24 RX ORDER — DULOXETIN HYDROCHLORIDE 30 MG/1
30 CAPSULE, DELAYED RELEASE ORAL DAILY
Qty: 90 CAPSULE | Refills: 1 | Status: SHIPPED | OUTPATIENT
Start: 2024-06-24

## 2024-07-16 ENCOUNTER — TELEPHONE (OUTPATIENT)
Dept: FAMILY MEDICINE CLINIC | Facility: CLINIC | Age: 64
End: 2024-07-16
Payer: COMMERCIAL

## 2024-07-16 NOTE — TELEPHONE ENCOUNTER
Caller: BRENNA FROM [facility]    Relationship to patient: Other    Best call back number: [phone]     Patient is needing: CALLER REACHING OUT A PRIOR AUTHORIZATION FOR OZEMPIC. CALLER STATES THAT THERE ARE SOME CLINICAL QUESTIONS THAT NEED TO BE ANSWERED BEFORE MEDICATION CAN BE APPROVED. CALLER STATES THAT PAPERWORK WAS FAXED ON 07.11.2024 AND THEY HAVEN'T HEARD BACK YET.

## 2024-07-22 DIAGNOSIS — G89.29 CHRONIC LOW BACK PAIN, UNSPECIFIED BACK PAIN LATERALITY, UNSPECIFIED WHETHER SCIATICA PRESENT: ICD-10-CM

## 2024-07-22 DIAGNOSIS — M54.50 CHRONIC LOW BACK PAIN, UNSPECIFIED BACK PAIN LATERALITY, UNSPECIFIED WHETHER SCIATICA PRESENT: ICD-10-CM

## 2024-07-22 RX ORDER — GABAPENTIN 300 MG/1
300 CAPSULE ORAL 3 TIMES DAILY
Qty: 270 CAPSULE | Refills: 0 | Status: SHIPPED | OUTPATIENT
Start: 2024-07-22

## 2024-07-22 NOTE — TELEPHONE ENCOUNTER
UPCOMING APPTS  With Family Medicine (LORENA Olivares)  12/02/2024 at 7:30 AM  LAST OFFICE VISIT - THIS DEPT  5/29/2024 Dae Yan APRN    [facility]ds 5/29/24

## 2024-07-26 ENCOUNTER — OFFICE VISIT (OUTPATIENT)
Dept: WOUND CARE | Facility: HOSPITAL | Age: 64
End: 2024-07-26
Payer: COMMERCIAL

## 2024-07-26 VITALS
RESPIRATION RATE: 18 BRPM | HEART RATE: 60 BPM | TEMPERATURE: 97 F | DIASTOLIC BLOOD PRESSURE: 68 MMHG | SYSTOLIC BLOOD PRESSURE: 122 MMHG

## 2024-07-26 DIAGNOSIS — L97.513 DIABETIC ULCER OF TOE OF RIGHT FOOT ASSOCIATED WITH TYPE 2 DIABETES MELLITUS, WITH NECROSIS OF MUSCLE: Primary | ICD-10-CM

## 2024-07-26 DIAGNOSIS — E11.621 DIABETIC ULCER OF TOE OF RIGHT FOOT ASSOCIATED WITH TYPE 2 DIABETES MELLITUS, WITH NECROSIS OF MUSCLE: Primary | ICD-10-CM

## 2024-07-26 PROCEDURE — G0463 HOSPITAL OUTPT CLINIC VISIT: HCPCS | Performed by: PHYSICIAN ASSISTANT

## 2024-07-26 RX ORDER — DOXYCYCLINE HYCLATE 100 MG/1
100 CAPSULE ORAL 2 TIMES DAILY
Qty: 14 CAPSULE | Refills: 0 | Status: SHIPPED | OUTPATIENT
Start: 2024-07-26 | End: 2024-08-02

## 2024-07-26 NOTE — PROGRESS NOTES
Chief Complaint  Wound Check (NEW PT, WOUND TO RIGHT GREAT TOE, PT NOT ON ANTIBIOTICS, DOES NOT MONITOR CBG REGULURLY, LAST (6/2/23)HgBA1C 7.6. PT CURRENTLY APPLYING SILVADENE CREAM & COLLAGEN POWDER DAILY WITH A DRY DRESSING. WOUND PRESENT FOR APPROXIMATELY 3 MONTHS. PT IS A  AND ON HIS FEET 8-10HRS DAILY. )    Subjective      History of Present Illness    Idris Park  is a 64 y.o. male here today for a wound on the tip of his right great toe.  Patient notes that this wound developed approximately 3 months ago without known injury.  Patient notes that he has been seeing [facility] for the past several months.  They had suggested surgery but patient did not opt to have surgery just yet.  So, he was then referred to us for further evaluation.  He is on his feet 8 to 10 hours daily and works as a .  He is a diabetic and is on Ozempic.  His last hemoglobin A1c on 6/2/2023 was 7.6.  He does not monitor his blood glucose levels regularly.  He currently is applying collagen powder to the wound bed and overlying with Silvadene daily.  He is covering with a Band-Aid.  He notes that the wound has not been improving with time.  He has not had any lab work or x-ray up to this point.      Allergies:  Cephalexin      Current Outpatient Medications:     DULoxetine (CYMBALTA) 30 MG capsule, TAKE 1 CAPSULE BY MOUTH DAILY, Disp: 90 capsule, Rfl: 1    etodolac (LODINE) 300 MG capsule, TAKE 1 CAPSULE BY MOUTH TWICE A DAY WITH A MEAL, Disp: 180 capsule, Rfl: 1    gabapentin (NEURONTIN) 300 MG capsule, TAKE 1 CAPSULE BY MOUTH 3 TIMES A DAY, Disp: 270 capsule, Rfl: 0    azithromycin (Zithromax Z-Virgilio) 250 MG tablet, Take 2 tablets by mouth on day 1, then 1 tablet daily on days 2-5 (Patient not taking: Reported on 7/26/2024), Disp: 6 tablet, Rfl: 0    coenzyme Q10 100 MG capsule, Take 1 capsule by mouth Daily. (Patient not taking: Reported on 2/12/2024), Disp: , Rfl:     cyclobenzaprine (FLEXERIL) 10 MG tablet,  Take 1 tablet by mouth 3 (Three) Times a Day As Needed for Muscle Spasms. (Patient not taking: Reported on 7/26/2024), Disp: 30 tablet, Rfl: 1    doxycycline (VIBRAMYCIN) 100 MG capsule, Take 1 capsule by mouth 2 (Two) Times a Day for 7 days. Do not take at the same time as any vitamin or mineral supplements., Disp: 14 capsule, Rfl: 0    hydroCHLOROthiazide (HYDRODIURIL) 25 MG tablet, TAKE ONE TABLET BY MOUTH DAILY (Patient not taking: Reported on 2/12/2024), Disp: 90 tablet, Rfl: 1    rOPINIRole (REQUIP) 1 MG tablet, TAKE ONE TABLET BY MOUTH 1 HOUR BEFORE BEDTIME (Patient not taking: Reported on 2/12/2024), Disp: 90 tablet, Rfl: 1    rosuvastatin (CRESTOR) 5 MG tablet, Take 1 tablet by mouth Daily. (Patient not taking: Reported on 2/12/2024), Disp: 90 tablet, Rfl: 1    Semaglutide,0.25 or 0.5MG/DOS, (Ozempic, 0.25 or 0.5 MG/DOSE,) 2 MG/3ML solution pen-injector, DIAL AND INJECT UNDER THE SKIN 0.25 MG WEEKLY (Patient not taking: Reported on 7/26/2024), Disp: 3 mL, Rfl: 1    sulfamethoxazole-trimethoprim (Bactrim DS) 800-160 MG per tablet, Take 1 tablet by mouth 2 (Two) Times a Day. (Patient not taking: Reported on 7/26/2024), Disp: 20 tablet, Rfl: 0    Past Medical History:   Diagnosis Date    Arthritis     COVID-19     Diabetes     Hyperlipidemia     Limb pain     Limb swelling      Past Surgical History:   Procedure Laterality Date    DENTAL PROCEDURE      EXTERNAL EAR SURGERY Bilateral     HERNIA REPAIR  1967    also 2011    JOINT REPLACEMENT Left     Artificial Joints/Limbs    KNEE ARTHROPLASTY Right     OTHER SURGICAL HISTORY      JOINT SURGERY     Social History     Socioeconomic History    Marital status:    Tobacco Use    Smoking status: Never    Smokeless tobacco: Never   Vaping Use    Vaping status: Never Used   Substance and Sexual Activity    Alcohol use: Not Currently     Comment: socially-BEER MONTHLY    Drug use: Never    Sexual activity: Yes     Partners: Female       Objective     Vitals:     07/26/24 1331   BP: 122/68   BP Location: Left arm   Patient Position: Sitting   Cuff Size: Adult   Pulse: 60   Resp: 18  Comment: 93% RA   Temp: 97 °F (36.1 °C)   TempSrc: Temporal   PainSc:   5   PainLoc: Toe       Review of Systems     ROS:  Per HPI.     I have reviewed the HPI and ROS as documented by MA/RN. Valentina Sinha PA-C    Physical Exam     NAD  AAOx3, pleasant, cooperative    Right great toe: Patient has a small sized open wound to the distal aspect of the patient's right great toe.  The wound has a good beefy base but does appear slightly stalled.  There is bogginess noted to the plantar surface of the right great toe.  There does appear to be some very mild erythema to the distal aspect of the right great toe.    See photos for details:    Right great toe:            Result Review :  The following data was reviewed by: Valentina Sinha PA-C on 07/26/2024:    Prior notes and images.           Assessment and Plan   Diagnoses and all orders for this visit:    1. Diabetic ulcer of toe of right foot associated with type 2 diabetes mellitus, with necrosis of muscle (Primary)  -     CBC & Differential; Future  -     Basic Metabolic Panel  -     Sedimentation Rate; Future  -     C-reactive Protein; Future  -     Prealbumin; Future  -     XR Foot 3+ View Right; Future    Other orders  -     doxycycline (VIBRAMYCIN) 100 MG capsule; Take 1 capsule by mouth 2 (Two) Times a Day for 7 days. Do not take at the same time as any vitamin or mineral supplements.  Dispense: 14 capsule; Refill: 0    Patient has a diabetic foot ulcer to his right great toe.  Today I am suspicious of osteomyelitis of the right great toe due to the bogginess of his right great toe as well as the nonhealing of the wound.  Today I will order lab work as well as an x-ray to rule out osteomyelitis.  I will also place patient on doxycycline for 7 days until we get results.  I discussed plan with patient in depth.  Patient should now  apply Aquacel Ag to the wound daily and cover with light gauze wrap.  Today I did also cut out a hole in his shoe insert in order to offload pressure onto the wound.  We discussed the importance of offloading the wound is much as possible as well as staying off the foot is much as possible.  This will be difficult for patient as he does work and is up on his feet a lot.  We also discussed the importance of monitoring his blood glucose levels very closely as well is consuming a healthy diet with plenty of protein to allow for wound healing.  He will follow back up with us in 3 weeks for further evaluation.    Patient was given instructions and counseling regarding their condition or for health maintenance advice, as well as the wound care plan and recommendations. They understand and agree with the plan.  They will follow back up here in the clinic but are instructed to contact us in the interim should they have any new, returning, or worsening symptoms or concerns. Please see specific information pulled into the AVS if appropriate.     Dragon Dictation utilized for chart completion.    Follow Up   Return in about 3 weeks (around 8/16/2024) for Recheck.      Valentina Sinha PA-C

## 2024-07-29 ENCOUNTER — HOSPITAL ENCOUNTER (OUTPATIENT)
Dept: GENERAL RADIOLOGY | Facility: HOSPITAL | Age: 64
Discharge: HOME | End: 2024-07-29
Payer: COMMERCIAL

## 2024-07-29 ENCOUNTER — LAB (OUTPATIENT)
Dept: LAB | Facility: HOSPITAL | Age: 64
End: 2024-07-29
Payer: COMMERCIAL

## 2024-07-29 DIAGNOSIS — E11.621 DIABETIC ULCER OF TOE OF RIGHT FOOT ASSOCIATED WITH TYPE 2 DIABETES MELLITUS, WITH NECROSIS OF MUSCLE: ICD-10-CM

## 2024-07-29 DIAGNOSIS — L97.513 DIABETIC ULCER OF TOE OF RIGHT FOOT ASSOCIATED WITH TYPE 2 DIABETES MELLITUS, WITH NECROSIS OF MUSCLE: ICD-10-CM

## 2024-07-29 LAB
ANION GAP SERPL CALCULATED.3IONS-SCNC: 12 MMOL/L (ref 5–15)
BASOPHILS # BLD AUTO: 0.04 10*3/MM3 (ref 0–0.2)
BASOPHILS NFR BLD AUTO: 0.5 % (ref 0–1.5)
BUN SERPL-MCNC: 11 MG/DL (ref 8–23)
BUN/CREAT SERPL: 10.2 (ref 7–25)
CALCIUM SPEC-SCNC: 9.5 MG/DL (ref 8.6–10.5)
CHLORIDE SERPL-SCNC: 101 MMOL/L (ref 98–107)
CO2 SERPL-SCNC: 27 MMOL/L (ref 22–29)
CREAT SERPL-MCNC: 1.08 MG/DL (ref 0.76–1.27)
CRP SERPL-MCNC: 0.49 MG/DL (ref 0–0.5)
DEPRECATED RDW RBC AUTO: 40.4 FL (ref 37–54)
EGFRCR SERPLBLD CKD-EPI 2021: 77.1 ML/MIN/1.73
EOSINOPHIL # BLD AUTO: 0.3 10*3/MM3 (ref 0–0.4)
EOSINOPHIL NFR BLD AUTO: 4.1 % (ref 0.3–6.2)
ERYTHROCYTE [DISTWIDTH] IN BLOOD BY AUTOMATED COUNT: 12.5 % (ref 12.3–15.4)
ERYTHROCYTE [SEDIMENTATION RATE] IN BLOOD: 9 MM/HR (ref 0–20)
GLUCOSE SERPL-MCNC: 105 MG/DL (ref 65–99)
HCT VFR BLD AUTO: 41.6 % (ref 37.5–51)
HGB BLD-MCNC: 13.9 G/DL (ref 13–17.7)
IMM GRANULOCYTES # BLD AUTO: 0.03 10*3/MM3 (ref 0–0.05)
IMM GRANULOCYTES NFR BLD AUTO: 0.4 % (ref 0–0.5)
LYMPHOCYTES # BLD AUTO: 1.39 10*3/MM3 (ref 0.7–3.1)
LYMPHOCYTES NFR BLD AUTO: 19.1 % (ref 19.6–45.3)
MCH RBC QN AUTO: 29.9 PG (ref 26.6–33)
MCHC RBC AUTO-ENTMCNC: 33.4 G/DL (ref 31.5–35.7)
MCV RBC AUTO: 89.5 FL (ref 79–97)
MONOCYTES # BLD AUTO: 0.57 10*3/MM3 (ref 0.1–0.9)
MONOCYTES NFR BLD AUTO: 7.8 % (ref 5–12)
NEUTROPHILS NFR BLD AUTO: 4.95 10*3/MM3 (ref 1.7–7)
NEUTROPHILS NFR BLD AUTO: 68.1 % (ref 42.7–76)
NRBC BLD AUTO-RTO: 0 /100 WBC (ref 0–0.2)
PLATELET # BLD AUTO: 259 10*3/MM3 (ref 140–450)
PMV BLD AUTO: 9.7 FL (ref 6–12)
POTASSIUM SERPL-SCNC: 4.1 MMOL/L (ref 3.5–5.2)
PREALB SERPL-MCNC: 23.1 MG/DL (ref 20–40)
RBC # BLD AUTO: 4.65 10*6/MM3 (ref 4.14–5.8)
SODIUM SERPL-SCNC: 140 MMOL/L (ref 136–145)
WBC NRBC COR # BLD AUTO: 7.28 10*3/MM3 (ref 3.4–10.8)

## 2024-07-29 PROCEDURE — 36415 COLL VENOUS BLD VENIPUNCTURE: CPT

## 2024-07-29 PROCEDURE — 85025 COMPLETE CBC W/AUTO DIFF WBC: CPT

## 2024-07-29 PROCEDURE — 73630 X-RAY EXAM OF FOOT: CPT

## 2024-07-29 PROCEDURE — 84134 ASSAY OF PREALBUMIN: CPT

## 2024-07-29 PROCEDURE — 80048 BASIC METABOLIC PNL TOTAL CA: CPT | Performed by: PHYSICIAN ASSISTANT

## 2024-07-29 PROCEDURE — 86140 C-REACTIVE PROTEIN: CPT

## 2024-07-29 PROCEDURE — 85652 RBC SED RATE AUTOMATED: CPT

## 2024-08-09 ENCOUNTER — OFFICE VISIT (OUTPATIENT)
Dept: WOUND CARE | Facility: HOSPITAL | Age: 64
End: 2024-08-09
Payer: COMMERCIAL

## 2024-08-09 VITALS
TEMPERATURE: 98.3 F | RESPIRATION RATE: 18 BRPM | DIASTOLIC BLOOD PRESSURE: 62 MMHG | HEART RATE: 65 BPM | SYSTOLIC BLOOD PRESSURE: 122 MMHG

## 2024-08-09 DIAGNOSIS — E11.621 DIABETIC ULCER OF TOE OF RIGHT FOOT ASSOCIATED WITH TYPE 2 DIABETES MELLITUS, WITH NECROSIS OF MUSCLE: Primary | ICD-10-CM

## 2024-08-09 DIAGNOSIS — L97.513 DIABETIC ULCER OF TOE OF RIGHT FOOT ASSOCIATED WITH TYPE 2 DIABETES MELLITUS, WITH NECROSIS OF MUSCLE: Primary | ICD-10-CM

## 2024-08-09 PROCEDURE — G0463 HOSPITAL OUTPT CLINIC VISIT: HCPCS | Performed by: PHYSICIAN ASSISTANT

## 2024-08-09 NOTE — PROGRESS NOTES
Chief Complaint  Wound Check (Wound check to right great toe, finished antibiotics (Doxycycline), not checking CBG. )    Subjective      History of Present Illness    Idris Park  is a 64 y.o. male here today for a wound on his right great toe.  His wound developed 4 months ago without known injury.  Patient notes that he has been seeing [facility] for the past several months.  They had suggested surgery but patient did not opt to have surgery.  He is on his feet 8 to 10 hours daily and works as a .  He is a diabetic and is on Ozempic.  His last hemoglobin A1c on 6/2/2023 was 7.6.  He does not monitor his blood glucose levels regularly.  He currently is applying collagen powder to the wound bed and overlying with Silvadene daily.  He is covering with a Band-Aid.  He notes that the wound has not been improving with time.  He has been applying Aquacel Ag to wound daily. He notes that his drainage is improving. He had the labs and xray that we ordered at last appointment. We cut out a hole in his shoe insert at last appointment in order to offload pressure onto the wound.        Allergies:  Cephalexin      Current Outpatient Medications:     azithromycin (Zithromax Z-Virgilio) 250 MG tablet, Take 2 tablets by mouth on day 1, then 1 tablet daily on days 2-5 (Patient not taking: Reported on 7/26/2024), Disp: 6 tablet, Rfl: 0    coenzyme Q10 100 MG capsule, Take 1 capsule by mouth Daily. (Patient not taking: Reported on 2/12/2024), Disp: , Rfl:     cyclobenzaprine (FLEXERIL) 10 MG tablet, Take 1 tablet by mouth 3 (Three) Times a Day As Needed for Muscle Spasms. (Patient not taking: Reported on 7/26/2024), Disp: 30 tablet, Rfl: 1    DULoxetine (CYMBALTA) 30 MG capsule, TAKE 1 CAPSULE BY MOUTH DAILY, Disp: 90 capsule, Rfl: 1    etodolac (LODINE) 300 MG capsule, TAKE 1 CAPSULE BY MOUTH TWICE A DAY WITH A MEAL, Disp: 180 capsule, Rfl: 1    gabapentin (NEURONTIN) 300 MG capsule, TAKE 1 CAPSULE BY MOUTH 3 TIMES  A DAY, Disp: 270 capsule, Rfl: 0    hydroCHLOROthiazide (HYDRODIURIL) 25 MG tablet, TAKE ONE TABLET BY MOUTH DAILY (Patient not taking: Reported on 2/12/2024), Disp: 90 tablet, Rfl: 1    rOPINIRole (REQUIP) 1 MG tablet, TAKE ONE TABLET BY MOUTH 1 HOUR BEFORE BEDTIME (Patient not taking: Reported on 2/12/2024), Disp: 90 tablet, Rfl: 1    rosuvastatin (CRESTOR) 5 MG tablet, Take 1 tablet by mouth Daily. (Patient not taking: Reported on 2/12/2024), Disp: 90 tablet, Rfl: 1    Semaglutide,0.25 or 0.5MG/DOS, (Ozempic, 0.25 or 0.5 MG/DOSE,) 2 MG/3ML solution pen-injector, DIAL AND INJECT UNDER THE SKIN 0.25 MG WEEKLY (Patient not taking: Reported on 7/26/2024), Disp: 3 mL, Rfl: 1    sulfamethoxazole-trimethoprim (Bactrim DS) 800-160 MG per tablet, Take 1 tablet by mouth 2 (Two) Times a Day. (Patient not taking: Reported on 7/26/2024), Disp: 20 tablet, Rfl: 0    Past Medical History:   Diagnosis Date    Arthritis     COVID-19     Diabetes     Hyperlipidemia     Limb pain     Limb swelling      Past Surgical History:   Procedure Laterality Date    DENTAL PROCEDURE      EXTERNAL EAR SURGERY Bilateral     HERNIA REPAIR  1967    also 2011    JOINT REPLACEMENT Left     Artificial Joints/Limbs    KNEE ARTHROPLASTY Right     OTHER SURGICAL HISTORY      JOINT SURGERY     Social History     Socioeconomic History    Marital status:    Tobacco Use    Smoking status: Never    Smokeless tobacco: Never   Vaping Use    Vaping status: Never Used   Substance and Sexual Activity    Alcohol use: Not Currently     Comment: socially-BEER MONTHLY    Drug use: Never    Sexual activity: Yes     Partners: Female       Objective     Vitals:    08/09/24 1059   BP: 122/62   BP Location: Left arm   Patient Position: Sitting   Cuff Size: Adult   Pulse: 65   Resp: 18  Comment: 97% RA   Temp: 98.3 °F (36.8 °C)   TempSrc: Temporal   PainSc: 0-No pain       Review of Systems     ROS:  Per HPI.     I have reviewed the HPI and ROS as documented by  MA/RN. Valentina Sinha PA-C    Physical Exam     NAD  AAOx3, pleasant, cooperative    Right great toe: Patient has a small sized open wound to the distal aspect of the patient's right great toe.  There is mild callous build up around wound today. The wound has a good beefy base but does appear slightly stalled. Silver nitrate applied today. There is improved bogginess noted to the plantar surface of the right great toe. Erythema to the distal aspect of the right great toe has also now improved.     See photos for details:    Right great toe:                Result Review :  The following data was reviewed by: Valentina Sinha PA-C on 08/09/2024:    Prior notes and images.    Reviewed today:     Xray completed on 7/23/24 showed no osteomyelitis    Labwork completed on 7/27/24 showed normal Prealbumin/sed rate/CRP.            Assessment and Plan   Diagnoses and all orders for this visit:    1. Diabetic ulcer of toe of right foot associated with type 2 diabetes mellitus, with necrosis of muscle (Primary)      Xray and labs showed no osteomyelitis. Reviewed results with patient today. Patient has a diabetic foot ulcer to his right great toe.   Patient should now apply Aquacel Ag to the wound daily and cover with light gauze wrap. We cut out a hole in his shoe insert at last appointment in order to offload pressure onto the wound.  We discussed the importance of offloading the wound is much as possible as well as staying off the foot is much as possible.  This will be difficult for patient as he does work and is up on his feet a lot.  We also discussed the importance of monitoring his blood glucose levels very closely as well is consuming a healthy diet with plenty of protein to allow for wound healing.  He will follow back up with us in 3 weeks for further evaluation.    Patient was given instructions and counseling regarding their condition or for health maintenance advice, as well as the wound care plan  and recommendations. They understand and agree with the plan.  They will follow back up here in the clinic but are instructed to contact us in the interim should they have any new, returning, or worsening symptoms or concerns. Please see specific information pulled into the AVS if appropriate.     Dragon Dictation utilized for chart completion.    Follow Up   Return in about 3 weeks (around 8/30/2024) for Recheck.      Valentina Sinha PA-C

## 2024-08-23 ENCOUNTER — LAB (OUTPATIENT)
Dept: LAB | Facility: HOSPITAL | Age: 64
End: 2024-08-23
Payer: COMMERCIAL

## 2024-08-23 ENCOUNTER — OFFICE VISIT (OUTPATIENT)
Dept: FAMILY MEDICINE CLINIC | Facility: CLINIC | Age: 64
End: 2024-08-23
Payer: COMMERCIAL

## 2024-08-23 ENCOUNTER — HOSPITAL ENCOUNTER (OUTPATIENT)
Dept: GENERAL RADIOLOGY | Facility: HOSPITAL | Age: 64
Discharge: HOME | End: 2024-08-23
Payer: COMMERCIAL

## 2024-08-23 VITALS
RESPIRATION RATE: 18 BRPM | BODY MASS INDEX: 34.63 KG/M2 | TEMPERATURE: 97 F | HEART RATE: 73 BPM | SYSTOLIC BLOOD PRESSURE: 138 MMHG | HEIGHT: 69 IN | OXYGEN SATURATION: 98 % | DIASTOLIC BLOOD PRESSURE: 80 MMHG | WEIGHT: 233.8 LBS

## 2024-08-23 DIAGNOSIS — M25.571 ACUTE RIGHT ANKLE PAIN: ICD-10-CM

## 2024-08-23 DIAGNOSIS — E11.621 DIABETIC ULCER OF RIGHT GREAT TOE: ICD-10-CM

## 2024-08-23 DIAGNOSIS — M79.675 GREAT TOE PAIN, LEFT: Primary | ICD-10-CM

## 2024-08-23 DIAGNOSIS — M79.675 GREAT TOE PAIN, LEFT: ICD-10-CM

## 2024-08-23 DIAGNOSIS — L97.519 DIABETIC ULCER OF RIGHT GREAT TOE: ICD-10-CM

## 2024-08-23 LAB — URATE SERPL-MCNC: 4.9 MG/DL (ref 3.4–7)

## 2024-08-23 PROCEDURE — 99213 OFFICE O/P EST LOW 20 MIN: CPT | Performed by: NURSE PRACTITIONER

## 2024-08-23 PROCEDURE — 36415 COLL VENOUS BLD VENIPUNCTURE: CPT

## 2024-08-23 PROCEDURE — 73610 X-RAY EXAM OF ANKLE: CPT

## 2024-08-23 PROCEDURE — 73630 X-RAY EXAM OF FOOT: CPT

## 2024-08-23 PROCEDURE — 84550 ASSAY OF BLOOD/URIC ACID: CPT

## 2024-08-23 NOTE — PROGRESS NOTES
Chief Complaint  Toe Pain (Left great toe)    Rachel Park presents to [facility]  History of Present Illness  Toe pain great toe pain.  Toe Pain   Pertinent negatives include no numbness.       The following portions of the patient's history were personally reviewed and updated as appropriate: allergies, current medications, past medical history, past surgical history, past family history, and past social history.     Body mass index is 34.51 kg/m².           Past History:    Medical History: has a past medical history of Arthritis, COVID-19, Diabetes, Hyperlipidemia, Limb pain, and Limb swelling.     Surgical History: has a past surgical history that includes Joint replacement (Left); Hernia repair (1967); Other surgical history; Dental surgery; External ear surgery (Bilateral); and Knee Arthroplasty (Right).     Family History: family history includes Breast cancer (age of onset: 50) in his mother; Cancer in his mother; Other in his father.     Social History: reports that he has never smoked. He has been exposed to tobacco smoke. He has never used smokeless tobacco. He reports that he does not currently use alcohol. He reports that he does not use drugs.    Allergies: Cephalexin          Current Outpatient Medications:     DULoxetine (CYMBALTA) 30 MG capsule, TAKE 1 CAPSULE BY MOUTH DAILY, Disp: 90 capsule, Rfl: 1    etodolac (LODINE) 300 MG capsule, TAKE 1 CAPSULE BY MOUTH TWICE A DAY WITH A MEAL, Disp: 180 capsule, Rfl: 1    gabapentin (NEURONTIN) 300 MG capsule, TAKE 1 CAPSULE BY MOUTH 3 TIMES A DAY, Disp: 270 capsule, Rfl: 0    coenzyme Q10 100 MG capsule, Take 1 capsule by mouth Daily. (Patient not taking: Reported on 2/12/2024), Disp: , Rfl:     cyclobenzaprine (FLEXERIL) 10 MG tablet, Take 1 tablet by mouth 3 (Three) Times a Day As Needed for Muscle Spasms. (Patient not taking: Reported on 7/26/2024), Disp: 30 tablet, Rfl: 1    hydroCHLOROthiazide  "(HYDRODIURIL) 25 MG tablet, TAKE ONE TABLET BY MOUTH DAILY (Patient not taking: Reported on 2/12/2024), Disp: 90 tablet, Rfl: 1    rOPINIRole (REQUIP) 1 MG tablet, TAKE ONE TABLET BY MOUTH 1 HOUR BEFORE BEDTIME (Patient not taking: Reported on 2/12/2024), Disp: 90 tablet, Rfl: 1    rosuvastatin (CRESTOR) 5 MG tablet, Take 1 tablet by mouth Daily. (Patient not taking: Reported on 2/12/2024), Disp: 90 tablet, Rfl: 1    Semaglutide,0.25 or 0.5MG/DOS, (Ozempic, 0.25 or 0.5 MG/DOSE,) 2 MG/3ML solution pen-injector, DIAL AND INJECT UNDER THE SKIN 0.25 MG WEEKLY (Patient not taking: Reported on 7/26/2024), Disp: 3 mL, Rfl: 1    Medications Discontinued During This Encounter   Medication Reason    azithromycin (Zithromax Z-Virgilio) 250 MG tablet *Therapy completed    sulfamethoxazole-trimethoprim (Bactrim DS) 800-160 MG per tablet *Therapy completed         Review of Systems   Constitutional:  Negative for fever.   Respiratory:  Negative for cough and shortness of breath.    Cardiovascular:  Negative for chest pain, palpitations and leg swelling.   Neurological:  Negative for dizziness, weakness, numbness and headache.        Objective         Vitals:    08/23/24 1420   BP: 138/80   BP Location: Right arm   Patient Position: Sitting   Cuff Size: Adult   Pulse: 73   Resp: 18   Temp: 97 °F (36.1 °C)   TempSrc: Temporal   SpO2: 98%   Weight: 106 kg (233 lb 12.8 oz)   Height: 175.3 cm (69.02\")     Body mass index is 34.51 kg/m².         Physical Exam  Vitals reviewed.   Constitutional:       Appearance: Normal appearance. He is well-developed.   HENT:      Head: Normocephalic and atraumatic.      Mouth/Throat:      Pharynx: No oropharyngeal exudate.   Eyes:      Conjunctiva/sclera: Conjunctivae normal.      Pupils: Pupils are equal, round, and reactive to light.   Cardiovascular:      Rate and Rhythm: Normal rate and regular rhythm.      Heart sounds: Normal heart sounds. No murmur heard.     No friction rub. No gallop. "   Pulmonary:      Effort: Pulmonary effort is normal.      Breath sounds: Normal breath sounds. No wheezing or rhonchi.   Musculoskeletal:        Feet:    Feet:      Comments: Small open ulcer that is granulating in but no redness.  Skin:     General: Skin is warm and dry.   Neurological:      Mental Status: He is alert and oriented to person, place, and time.   Psychiatric:         Mood and Affect: Mood and affect normal.         Behavior: Behavior normal.         Thought Content: Thought content normal.         Judgment: Judgment normal.             Result Review :               Assessment and Plan     Diagnoses and all orders for this visit:    1. Great toe pain, left (Primary)  -     XR Foot 3+ View Left; Future  -     Uric acid; Future    2. Diabetic ulcer of right great toe    3. Acute right ankle pain  -     XR Ankle 3+ View Right; Future              Follow Up     Return for Next scheduled follow up.    Patient was given instructions and counseling regarding his condition or for health maintenance advice. Please see specific information pulled into the AVS if appropriate.

## 2024-08-28 ENCOUNTER — OFFICE VISIT (OUTPATIENT)
Dept: ORTHOPEDIC SURGERY | Facility: CLINIC | Age: 64
End: 2024-08-28
Payer: COMMERCIAL

## 2024-08-28 VITALS
HEART RATE: 66 BPM | WEIGHT: 228 LBS | SYSTOLIC BLOOD PRESSURE: 131 MMHG | BODY MASS INDEX: 33.77 KG/M2 | HEIGHT: 69 IN | OXYGEN SATURATION: 94 % | DIASTOLIC BLOOD PRESSURE: 77 MMHG

## 2024-08-28 DIAGNOSIS — M17.11 OSTEOARTHRITIS OF RIGHT KNEE, UNSPECIFIED OSTEOARTHRITIS TYPE: ICD-10-CM

## 2024-08-28 DIAGNOSIS — M25.561 RIGHT KNEE PAIN, UNSPECIFIED CHRONICITY: Primary | ICD-10-CM

## 2024-08-28 RX ORDER — TRIAMCINOLONE ACETONIDE 40 MG/ML
40 INJECTION, SUSPENSION INTRA-ARTICULAR; INTRAMUSCULAR
Status: COMPLETED | OUTPATIENT
Start: 2024-08-28 | End: 2024-08-28

## 2024-08-28 RX ORDER — LIDOCAINE HYDROCHLORIDE 10 MG/ML
5 INJECTION, SOLUTION INFILTRATION; PERINEURAL
Status: COMPLETED | OUTPATIENT
Start: 2024-08-28 | End: 2024-08-28

## 2024-08-28 RX ADMIN — TRIAMCINOLONE ACETONIDE 40 MG: 40 INJECTION, SUSPENSION INTRA-ARTICULAR; INTRAMUSCULAR at 08:27

## 2024-08-28 RX ADMIN — LIDOCAINE HYDROCHLORIDE 5 ML: 10 INJECTION, SOLUTION INFILTRATION; PERINEURAL at 08:27

## 2024-08-28 NOTE — PROGRESS NOTES
"Chief Complaint  Follow-up of the Right Knee     Subjective      Idris Park presents to [facility] for follow-up of right knee pain and osteoarthritis, which she has managed conservatively with intermittent steroid injections in the past. He was last seen in office for this on 1/11/23.  He has had increase pain the last few months causing difficulty with prolonged ambulation, stairs and walking on uneven ground.      Allergies   Allergen Reactions    Cephalexin Rash        Social History     Socioeconomic History    Marital status:    Tobacco Use    Smoking status: Never     Passive exposure: Past    Smokeless tobacco: Never   Vaping Use    Vaping status: Never Used   Substance and Sexual Activity    Alcohol use: Not Currently     Comment: socially-BEER MONTHLY    Drug use: Never    Sexual activity: Yes     Partners: Female        I reviewed the patient's chief complaint, history of present illness, review of systems, past medical history, surgical history, family history, social history, medications, and allergy list.     Review of Systems     Constitutional: Denies fevers, chills, weight loss  Cardiovascular: Denies chest pain, shortness of breath  Skin: Denies rashes, acute skin changes  Neurologic: Denies headache, loss of consciousness        Vital Signs:   /77   Pulse 66   Ht 175.3 cm (69.02\")   Wt 103 kg (228 lb)   SpO2 94%   BMI 33.65 kg/m²          Physical Exam  General: Alert. No acute distress    Ortho Exam        Right knee: Skin is warm, dry, and intact. Mild edema. Tenderness to palpation of medial and lateral joint lines. Crepitus with ROM. Full knee extension and flexion to 12 degrees. Full plantarflexion and dorsiflexion of the ankle. Sensation intact to light touch. Distal neurovascular intact. Patient is fully ambulatory with mildly antalgic gait.       Large Joint Arthrocentesis: R knee  Date/Time: 8/28/2024 8:27 AM  Consent given by: " patient  Site marked: site marked  Timeout: Immediately prior to procedure a time out was called to verify the correct patient, procedure, equipment, support staff and site/side marked as required   Supporting Documentation  Indications: pain   Procedure Details  Location: knee - R knee  Needle gauge: 21 G.  Medications administered: 5 mL lidocaine 1 %; 40 mg triamcinolone acetonide 40 MG/ML  Patient tolerance: patient tolerated the procedure well with no immediate complications      This injection documentation was Scribed for Jb Campos MD by Shaun Price.  08/28/24   08:28 EDT      Imaging Results (Most Recent)       None             Result Review :                 Assessment and Plan     Diagnoses and all orders for this visit:    1. Right knee pain, unspecified chronicity (Primary)    2. Osteoarthritis of right knee, unspecified osteoarthritis type        Discussed the treatment plan with the patient.     Discussed the risks and benefits of conservative measures. The patient expressed understanding and wished to proceed with a right knee steroid injection.  He tolerated the injection well.     Call or return if worsening symptoms.    Follow Up     PRN      Patient was given instructions and counseling regarding his condition or for health maintenance advice. Please see specific information pulled into the AVS if appropriate.     Scribed for Jb Campos MD by Ann Guaman MA.  08/28/24   08:02 EDT    I have personally performed the services described in this document as scribed by the above individual and it is both accurate and complete. Jb Campos MD 08/28/24

## 2024-09-03 ENCOUNTER — PATIENT MESSAGE (OUTPATIENT)
Dept: FAMILY MEDICINE CLINIC | Facility: CLINIC | Age: 64
End: 2024-09-03
Payer: COMMERCIAL

## 2024-09-03 DIAGNOSIS — L03.119 CELLULITIS OF LOWER EXTREMITY, UNSPECIFIED LATERALITY: Primary | ICD-10-CM

## 2024-09-03 RX ORDER — SULFAMETHOXAZOLE/TRIMETHOPRIM 800-160 MG
1 TABLET ORAL 2 TIMES DAILY
Qty: 14 TABLET | Refills: 0 | Status: SHIPPED | OUTPATIENT
Start: 2024-09-03

## 2024-09-06 ENCOUNTER — OFFICE VISIT (OUTPATIENT)
Dept: WOUND CARE | Facility: HOSPITAL | Age: 64
End: 2024-09-06
Payer: COMMERCIAL

## 2024-09-06 VITALS
HEART RATE: 64 BPM | TEMPERATURE: 97.2 F | DIASTOLIC BLOOD PRESSURE: 72 MMHG | RESPIRATION RATE: 16 BRPM | SYSTOLIC BLOOD PRESSURE: 120 MMHG

## 2024-09-06 DIAGNOSIS — L08.9 WOUND INFECTION: ICD-10-CM

## 2024-09-06 DIAGNOSIS — E11.621 DIABETIC ULCER OF TOE OF RIGHT FOOT ASSOCIATED WITH TYPE 2 DIABETES MELLITUS, WITH NECROSIS OF MUSCLE: Primary | ICD-10-CM

## 2024-09-06 DIAGNOSIS — L03.115 CELLULITIS OF RIGHT LOWER EXTREMITY: ICD-10-CM

## 2024-09-06 DIAGNOSIS — L97.513 DIABETIC ULCER OF TOE OF RIGHT FOOT ASSOCIATED WITH TYPE 2 DIABETES MELLITUS, WITH NECROSIS OF MUSCLE: Primary | ICD-10-CM

## 2024-09-06 DIAGNOSIS — T14.8XXA WOUND INFECTION: ICD-10-CM

## 2024-09-06 PROCEDURE — G0463 HOSPITAL OUTPT CLINIC VISIT: HCPCS | Performed by: EMERGENCY MEDICINE

## 2024-09-06 RX ORDER — DOXYCYCLINE 100 MG/1
100 CAPSULE ORAL 2 TIMES DAILY
Qty: 84 CAPSULE | Refills: 0 | Status: SHIPPED | OUTPATIENT
Start: 2024-09-06 | End: 2024-10-18

## 2024-09-06 NOTE — PROGRESS NOTES
Chief Complaint  Wound Check (Wound check to right great toe, pt states the wound is about the same but he developed increased redness and pain in RLE on 9/3/24,  PCP called in Bactrim, pt is currently taking Bactrim and changing dressing BID. ( CBG - not monitoring) )    Subjective      History of Present Illness    Idris Park  is a 64 y.o. male here today for a wound on his right great toe.  His wound developed 5 months ago without known injury. He notes that the wound is bigger today. He also developed cellulitis of his right leg that extends from his great toe 3 days ago. He called his PCP and they started him on Bactrim. He previously has seen [facility] for several months.  They had suggested surgery but patient did not opt to have surgery.  He is on his feet 8 to 10 hours daily and works as a .  He is a diabetic and is on Ozempic.  His last hemoglobin A1c on 6/2/2023 was 7.6.  He does not monitor his blood glucose levels regularly.  He currently is applying Aquacel Ago to the wound bed 2x daily.  He notes that his drainage continues. We cut out a hole in his shoe insert at last appointment in order to offload pressure onto the wound.  Recent xrays and labs suggested no osteomyelitis.     Allergies:  Cephalexin      Current Outpatient Medications:     coenzyme Q10 100 MG capsule, Take 1 capsule by mouth Daily. (Patient not taking: Reported on 2/12/2024), Disp: , Rfl:     cyclobenzaprine (FLEXERIL) 10 MG tablet, Take 1 tablet by mouth 3 (Three) Times a Day As Needed for Muscle Spasms. (Patient not taking: Reported on 7/26/2024), Disp: 30 tablet, Rfl: 1    doxycycline (VIBRAMYCIN) 100 MG capsule, Take 1 capsule by mouth 2 (Two) Times a Day for 42 days. Do not take at the same time as any vitamin or mineral supplements., Disp: 84 capsule, Rfl: 0    DULoxetine (CYMBALTA) 30 MG capsule, TAKE 1 CAPSULE BY MOUTH DAILY, Disp: 90 capsule, Rfl: 1    etodolac (LODINE) 300 MG capsule, TAKE 1 CAPSULE  BY MOUTH TWICE A DAY WITH A MEAL, Disp: 180 capsule, Rfl: 1    gabapentin (NEURONTIN) 300 MG capsule, TAKE 1 CAPSULE BY MOUTH 3 TIMES A DAY, Disp: 270 capsule, Rfl: 0    hydroCHLOROthiazide (HYDRODIURIL) 25 MG tablet, TAKE ONE TABLET BY MOUTH DAILY (Patient not taking: Reported on 2/12/2024), Disp: 90 tablet, Rfl: 1    rOPINIRole (REQUIP) 1 MG tablet, TAKE ONE TABLET BY MOUTH 1 HOUR BEFORE BEDTIME (Patient not taking: Reported on 2/12/2024), Disp: 90 tablet, Rfl: 1    rosuvastatin (CRESTOR) 5 MG tablet, Take 1 tablet by mouth Daily. (Patient not taking: Reported on 2/12/2024), Disp: 90 tablet, Rfl: 1    Semaglutide,0.25 or 0.5MG/DOS, (Ozempic, 0.25 or 0.5 MG/DOSE,) 2 MG/3ML solution pen-injector, DIAL AND INJECT UNDER THE SKIN 0.25 MG WEEKLY, Disp: 3 mL, Rfl: 1    sulfamethoxazole-trimethoprim (Bactrim DS) 800-160 MG per tablet, Take 1 tablet by mouth 2 (Two) Times a Day., Disp: 14 tablet, Rfl: 0    Past Medical History:   Diagnosis Date    Arthritis     COVID-19     Diabetes     Hyperlipidemia     Limb pain     Limb swelling      Past Surgical History:   Procedure Laterality Date    DENTAL PROCEDURE      EXTERNAL EAR SURGERY Bilateral     HERNIA REPAIR  1967    also 2011    JOINT REPLACEMENT Left     Artificial Joints/Limbs    KNEE ARTHROPLASTY Right     OTHER SURGICAL HISTORY      JOINT SURGERY     Social History     Socioeconomic History    Marital status:    Tobacco Use    Smoking status: Never     Passive exposure: Past    Smokeless tobacco: Never   Vaping Use    Vaping status: Never Used   Substance and Sexual Activity    Alcohol use: Not Currently     Comment: socially-BEER MONTHLY    Drug use: Never    Sexual activity: Yes     Partners: Female       Objective     Vitals:    09/06/24 1450   BP: 120/72   BP Location: Left arm   Patient Position: Sitting   Cuff Size: Adult   Pulse: 64   Resp: 16  Comment: 95% RA   Temp: 97.2 °F (36.2 °C)   TempSrc: Temporal   PainSc:   3   PainLoc: Toe     Review of  Systems     ROS:  Per HPI.     I have reviewed the HPI and ROS as documented by MA/RN. Valentina Sinha PA-C    Physical Exam     NAD  AAOx3, pleasant, cooperative    Right great toe: Patient has a small sized open wound to the distal aspect of the patient's right great toe.  The wound is bigger today. There is mild callous build up around wound today. This was peeled away. The wound has a good beefy base but does appear slightly stalled.  There is improved bogginess noted to the plantar surface of the right great toe. Erythema to the distal aspect of the right great toe is now streaking up to his right lower leg with cellulitis in right lower leg.     See photos for details:    Right great toe:        Result Review :  The following data was reviewed by: Valentina Sinha PA-C on 09/06/2024:    Prior notes and images.    Reviewed today:     Xray completed on 7/23/24 showed no osteomyelitis    Labwork completed on 7/27/24 showed normal Prealbumin/sed rate/CRP.          Assessment and Plan   Diagnoses and all orders for this visit:    1. Diabetic ulcer of toe of right foot associated with type 2 diabetes mellitus, with necrosis of muscle (Primary)    2. Cellulitis of right lower extremity    3. Wound infection    Other orders  -     doxycycline (VIBRAMYCIN) 100 MG capsule; Take 1 capsule by mouth 2 (Two) Times a Day for 42 days. Do not take at the same time as any vitamin or mineral supplements.  Dispense: 84 capsule; Refill: 0    Patient has a worsening diabetic foot ulcer to his right great toe.  It now has streaking up his leg with cellulitis in right lower leg.  Xray and labs showed no osteomyelitis.  However, I am highly suspicious of osteomyelitis of his right great toe. We will start him on 6 weeks of doxycycline today. We may consider an MRI of foot at next appointment. Patient should now apply Aquacel Ag to the wound daily and cover with light gauze wrap. He will paint his wound with betadine and  let dry in between dressing changes. We cut out a hole in his shoe insert in order to offload pressure onto the wound.  We discussed the importance of offloading the wound is much as possible as well as staying off the foot is much as possible.  This will be difficult for patient as he does work and is up on his feet a lot.  We also discussed the importance of monitoring his blood glucose levels very closely as well is consuming a healthy diet with plenty of protein to allow for wound healing.  He will follow back up with us in 2 weeks for further evaluation.    Patient was given instructions and counseling regarding their condition or for health maintenance advice, as well as the wound care plan and recommendations. They understand and agree with the plan.  They will follow back up here in the clinic but are instructed to contact us in the interim should they have any new, returning, or worsening symptoms or concerns. Please see specific information pulled into the AVS if appropriate.     Dragon Dictation utilized for chart completion.    Follow Up   Return in about 2 weeks (around 9/20/2024) for Recheck.      Valentina Sinha PA-C

## 2024-09-20 ENCOUNTER — OFFICE VISIT (OUTPATIENT)
Dept: WOUND CARE | Facility: HOSPITAL | Age: 64
End: 2024-09-20
Payer: COMMERCIAL

## 2024-09-20 VITALS
DIASTOLIC BLOOD PRESSURE: 80 MMHG | SYSTOLIC BLOOD PRESSURE: 124 MMHG | RESPIRATION RATE: 18 BRPM | HEART RATE: 58 BPM | TEMPERATURE: 97.1 F

## 2024-09-20 DIAGNOSIS — E11.621 DIABETIC ULCER OF TOE OF RIGHT FOOT ASSOCIATED WITH TYPE 2 DIABETES MELLITUS, WITH NECROSIS OF MUSCLE: Primary | ICD-10-CM

## 2024-09-20 DIAGNOSIS — L97.513 DIABETIC ULCER OF TOE OF RIGHT FOOT ASSOCIATED WITH TYPE 2 DIABETES MELLITUS, WITH NECROSIS OF MUSCLE: Primary | ICD-10-CM

## 2024-09-20 PROCEDURE — G0463 HOSPITAL OUTPT CLINIC VISIT: HCPCS | Performed by: PHYSICIAN ASSISTANT

## 2024-09-25 DIAGNOSIS — S92.302D: Primary | ICD-10-CM

## 2024-10-09 ENCOUNTER — OFFICE VISIT (OUTPATIENT)
Dept: ORTHOPEDIC SURGERY | Facility: CLINIC | Age: 64
End: 2024-10-09
Payer: COMMERCIAL

## 2024-10-09 VITALS
BODY MASS INDEX: 33.33 KG/M2 | HEART RATE: 58 BPM | SYSTOLIC BLOOD PRESSURE: 136 MMHG | HEIGHT: 69 IN | WEIGHT: 225 LBS | OXYGEN SATURATION: 95 % | DIASTOLIC BLOOD PRESSURE: 78 MMHG

## 2024-10-09 DIAGNOSIS — M75.41 IMPINGEMENT SYNDROME OF RIGHT SHOULDER: ICD-10-CM

## 2024-10-09 DIAGNOSIS — M25.511 RIGHT SHOULDER PAIN, UNSPECIFIED CHRONICITY: Primary | ICD-10-CM

## 2024-10-09 RX ORDER — TRIAMCINOLONE ACETONIDE 40 MG/ML
40 INJECTION, SUSPENSION INTRA-ARTICULAR; INTRAMUSCULAR
Status: COMPLETED | OUTPATIENT
Start: 2024-10-09 | End: 2024-10-09

## 2024-10-09 RX ORDER — LIDOCAINE HYDROCHLORIDE 10 MG/ML
5 INJECTION, SOLUTION INFILTRATION; PERINEURAL
Status: COMPLETED | OUTPATIENT
Start: 2024-10-09 | End: 2024-10-09

## 2024-10-09 RX ADMIN — LIDOCAINE HYDROCHLORIDE 5 ML: 10 INJECTION, SOLUTION INFILTRATION; PERINEURAL at 09:37

## 2024-10-09 RX ADMIN — TRIAMCINOLONE ACETONIDE 40 MG: 40 INJECTION, SUSPENSION INTRA-ARTICULAR; INTRAMUSCULAR at 09:37

## 2024-10-09 NOTE — PROGRESS NOTES
"Chief Complaint  Initial Evaluation of the Right Shoulder     Subjective      Idris Park presents to [facility] for initial evaluation of the right shoulder.  He has pain in the armpit and the anterior aspect of the shoulder and down the arm.  He has pain with overhead and forward ROM of the shoulder.  He has had no injury in the past.  He has had an AC separation in the past.      Allergies   Allergen Reactions    Cephalexin Rash        Social History     Socioeconomic History    Marital status:    Tobacco Use    Smoking status: Never     Passive exposure: Past    Smokeless tobacco: Never   Vaping Use    Vaping status: Never Used   Substance and Sexual Activity    Alcohol use: Not Currently     Comment: socially-BEER MONTHLY    Drug use: Never    Sexual activity: Yes     Partners: Female        I reviewed the patient's chief complaint, history of present illness, review of systems, past medical history, surgical history, family history, social history, medications, and allergy list.     Review of Systems     Constitutional: Denies fevers, chills, weight loss  Cardiovascular: Denies chest pain, shortness of breath  Skin: Denies rashes, acute skin changes  Neurologic: Denies headache, loss of consciousness        Vital Signs:   /78   Pulse 58   Ht 175.3 cm (69\")   Wt 102 kg (225 lb)   SpO2 95%   BMI 33.23 kg/m²          Physical Exam  General: Alert. No acute distress    Ortho Exam        RIGHT SHOULDER Forward flexion 180. Abduction 100. External rotation 60. Internal rotation SI joint. Positive Cross body adduction. Supraspinatus strength 5/5. Infraspinatus Strength 5/5. Infrared subscap 5/5. Positive Guzman. Positive Neer. Negative Apprehension. Negative Lift off. (Negative Obriens. Sensation intact to light touch, median, radial, ulnar nerve. Positive AIN, PIN, ulnar nerve motor. Positive pulses. Positive Impingement signs. Good strength in triceps, biceps, " deltoid, wrist extensors and wrist flexors. Tender to palpation to the anterior aspect of the shoulder and down the arm.        Large Joint Arthrocentesis: R subacromial bursa  Date/Time: 10/9/2024 9:37 AM  Consent given by: patient  Site marked: site marked  Timeout: Immediately prior to procedure a time out was called to verify the correct patient, procedure, equipment, support staff and site/side marked as required   Supporting Documentation  Indications: pain   Procedure Details  Location: shoulder - R subacromial bursa  Needle gauge: 21 G.  Medications administered: 5 mL lidocaine 1 %; 40 mg triamcinolone acetonide 40 MG/ML  Patient tolerance: patient tolerated the procedure well with no immediate complications        This injection documentation was Scribed for Jb Campos MD by Shaun Price.  10/09/24   09:38 EDT      Imaging Results (Most Recent)       Procedure Component Value Units Date/Time    XR Scapula Right [[id number]] Resulted: 10/09/24 0927     Updated: 10/09/24 0928             Result Review :     X-Ray Report:  Right scapula X-Ray  Indication: Evaluation of the right scapula  AP/Lateral view(s)  Findings: Moderate to severe AC joint arthritis.   Prior studies available for comparison: no             Assessment and Plan     Diagnoses and all orders for this visit:    1. Right shoulder pain, unspecified chronicity (Primary)  -     XR Scapula Right  -     Large Joint Arthrocentesis: R subacromial bursa    2. Impingement syndrome of right shoulder        Discussed the treatment plan with the patient. I reviewed the X-rays that were obtained today with the patient.     Discussed the risks and benefits of conservative measures. The patient expressed understanding and wished to proceed with a right shoulder steroid injection.  He tolerated the injection well.       Call or return if worsening symptoms.    Follow Up     4-6 weeks to assess if injection was helpful.       Patient was given instructions and  counseling regarding his condition or for health maintenance advice. Please see specific information pulled into the AVS if appropriate.     Scribed for Jb Campos MD by Ann Guaman MA.  10/09/24   09:32 EDT    I have personally performed the services described in this document as scribed by the above individual and it is both accurate and complete. Jb Campos MD 10/09/24

## 2024-10-16 DIAGNOSIS — M19.90 ARTHRITIS: ICD-10-CM

## 2024-10-16 RX ORDER — ETODOLAC 300 MG
CAPSULE ORAL
Qty: 180 CAPSULE | Refills: 1 | Status: SHIPPED | OUTPATIENT
Start: 2024-10-16

## 2024-10-18 ENCOUNTER — OFFICE VISIT (OUTPATIENT)
Dept: WOUND CARE | Facility: HOSPITAL | Age: 64
End: 2024-10-18
Payer: COMMERCIAL

## 2024-10-18 VITALS
RESPIRATION RATE: 18 BRPM | TEMPERATURE: 98.1 F | DIASTOLIC BLOOD PRESSURE: 76 MMHG | SYSTOLIC BLOOD PRESSURE: 121 MMHG | HEART RATE: 63 BPM

## 2024-10-18 DIAGNOSIS — L08.9 WOUND INFECTION: ICD-10-CM

## 2024-10-18 DIAGNOSIS — L03.115 CELLULITIS OF RIGHT LOWER EXTREMITY: ICD-10-CM

## 2024-10-18 DIAGNOSIS — L97.524 DIABETIC ULCER OF TOE OF LEFT FOOT ASSOCIATED WITH TYPE 2 DIABETES MELLITUS, WITH NECROSIS OF BONE: ICD-10-CM

## 2024-10-18 DIAGNOSIS — E11.621 DIABETIC ULCER OF TOE OF LEFT FOOT ASSOCIATED WITH TYPE 2 DIABETES MELLITUS, WITH NECROSIS OF BONE: ICD-10-CM

## 2024-10-18 DIAGNOSIS — L97.513 DIABETIC ULCER OF TOE OF RIGHT FOOT ASSOCIATED WITH TYPE 2 DIABETES MELLITUS, WITH NECROSIS OF MUSCLE: Primary | ICD-10-CM

## 2024-10-18 DIAGNOSIS — E11.621 DIABETIC ULCER OF TOE OF RIGHT FOOT ASSOCIATED WITH TYPE 2 DIABETES MELLITUS, WITH NECROSIS OF MUSCLE: Primary | ICD-10-CM

## 2024-10-18 DIAGNOSIS — T14.8XXA WOUND INFECTION: ICD-10-CM

## 2024-10-18 PROCEDURE — G0463 HOSPITAL OUTPT CLINIC VISIT: HCPCS | Performed by: PHYSICIAN ASSISTANT

## 2024-10-18 PROCEDURE — 87205 SMEAR GRAM STAIN: CPT | Performed by: PHYSICIAN ASSISTANT

## 2024-10-18 PROCEDURE — 87070 CULTURE OTHR SPECIMN AEROBIC: CPT | Performed by: PHYSICIAN ASSISTANT

## 2024-10-18 NOTE — PROGRESS NOTES
Chief Complaint  Wound Check (Wound check to right great toe, pt changing dressing daily, pt states the wound is 'up and down. It gets better on the weekends when he is off of it but it gets worse when up on his feet at work'. (Not monitoring CBG). Pt continues ABX (Doxycyline). )    Subjective      History of Present Illness    Idris Park  is a 64 y.o. male here today for a wound on his right great toe. His toe wound developed 8 months ago without known injury.He finished Bactrim from his PCP and is currently on 6 weeks of doxycycline from our clinic for suspected osteomyelitis. [facility]  They had suggested surgery but patient did not opt to have surgery.  He is on his feet 8 to 10 hours daily and works as a .  He is a diabetic and is on Ozempic.  He does not monitor his blood glucose levels regularly.  He currently is applying Aquacel Ag to the wound bed daily.  He notes that his drainage continues. We cut out a hole in his shoe insert at previous appointment in order to offload pressure onto the wound.  His wound is worse today.  He continues to have pain and redness into the dorsal aspect of his right foot.  He notes that he has not been monitoring his blood glucose levels very well and that they are likely pretty high.    Allergies:  Cephalexin      Current Outpatient Medications:     coenzyme Q10 100 MG capsule, Take 1 capsule by mouth Daily., Disp: , Rfl:     cyclobenzaprine (FLEXERIL) 10 MG tablet, Take 1 tablet by mouth 3 (Three) Times a Day As Needed for Muscle Spasms., Disp: 30 tablet, Rfl: 1    doxycycline (VIBRAMYCIN) 100 MG capsule, Take 1 capsule by mouth 2 (Two) Times a Day for 42 days. Do not take at the same time as any vitamin or mineral supplements., Disp: 84 capsule, Rfl: 0    DULoxetine (CYMBALTA) 30 MG capsule, TAKE 1 CAPSULE BY MOUTH DAILY, Disp: 90 capsule, Rfl: 1    etodolac (LODINE) 300 MG capsule, TAKE 1 CAPSULE BY MOUTH  TWICE A DAY WITH A MEAL, Disp: 180 capsule, Rfl: 1    gabapentin (NEURONTIN) 300 MG capsule, TAKE 1 CAPSULE BY MOUTH 3 TIMES A DAY, Disp: 270 capsule, Rfl: 0    hydroCHLOROthiazide (HYDRODIURIL) 25 MG tablet, TAKE ONE TABLET BY MOUTH DAILY, Disp: 90 tablet, Rfl: 1    rOPINIRole (REQUIP) 1 MG tablet, TAKE ONE TABLET BY MOUTH 1 HOUR BEFORE BEDTIME, Disp: 90 tablet, Rfl: 1    rosuvastatin (CRESTOR) 5 MG tablet, Take 1 tablet by mouth Daily., Disp: 90 tablet, Rfl: 1    Semaglutide,0.25 or 0.5MG/DOS, (Ozempic, 0.25 or 0.5 MG/DOSE,) 2 MG/3ML solution pen-injector, DIAL AND INJECT UNDER THE SKIN 0.25 MG WEEKLY, Disp: 3 mL, Rfl: 1    Past Medical History:   Diagnosis Date    Arthritis     COVID-19     Diabetes     Hyperlipidemia     Limb pain     Limb swelling      Past Surgical History:   Procedure Laterality Date    DENTAL PROCEDURE      EXTERNAL EAR SURGERY Bilateral     HERNIA REPAIR  1967    also 2011    JOINT REPLACEMENT Left     Artificial Joints/Limbs    KNEE ARTHROPLASTY Right     OTHER SURGICAL HISTORY      JOINT SURGERY     Social History     Socioeconomic History    Marital status:    Tobacco Use    Smoking status: Never     Passive exposure: Past    Smokeless tobacco: Never   Vaping Use    Vaping status: Never Used   Substance and Sexual Activity    Alcohol use: Not Currently     Comment: socially-BEER MONTHLY    Drug use: Never    Sexual activity: Yes     Partners: Female     Objective     Vitals:    10/18/24 0838   BP: 121/76   BP Location: Left arm   Patient Position: Sitting   Cuff Size: Adult   Pulse: 63   Resp: 18  Comment: 97% RA   Temp: 98.1 °F (36.7 °C)   TempSrc: Temporal   PainSc: 0-No pain     Review of Systems     ROS:  Per HPI.     I have reviewed the HPI and ROS as documented by MA/RN. Valentina Sinha PA-C    Physical Exam     NAD  AAOx3, pleasant, cooperative    Right great toe: Patient has a moderate sized open wound to the distal aspect of the patient's right great toe.  The  wound is worsened today. There is increased callous build up and maceration around wound today. This was peeled away. The wound does appear opaque today.  There is increased drainage and wound base today.  We did obtain a wound culture today.    See photos for details:    Right great toe:            Result Review :  The following data was reviewed by: Valentina Sinha PA-C on 10/18/2024:    Prior notes and images.    Reviewed today:     Xray completed on 7/23/24 showed no osteomyelitis    Labwork completed on 7/27/24 showed normal Prealbumin/sed rate/CRP.          Assessment and Plan   Diagnoses and all orders for this visit:    1. Diabetic ulcer of toe of right foot associated with type 2 diabetes mellitus, with necrosis of muscle (Primary)  -     MRI Foot Right With & Without Contrast; Future  -     Comprehensive Metabolic Panel; Future  -     C-reactive Protein; Future  -     Prealbumin; Future  -     Wound Culture - Wound, Toe, Right    2. Cellulitis of right lower extremity  -     MRI Foot Right With & Without Contrast; Future  -     Comprehensive Metabolic Panel; Future  -     C-reactive Protein; Future  -     Prealbumin; Future    3. Wound infection  -     MRI Foot Right With & Without Contrast; Future  -     Comprehensive Metabolic Panel; Future  -     C-reactive Protein; Future  -     Prealbumin; Future    4. Diabetic ulcer of toe of left foot associated with type 2 diabetes mellitus, with necrosis of bone  -     MRI Foot Right With & Without Contrast; Future  -     CBC & Differential; Future  -     Comprehensive Metabolic Panel; Future  -     Sedimentation Rate; Future  -     C-reactive Protein; Future  -     Prealbumin; Future      Patient has a worsening diabetic foot ulcer to his right great toe. Xray and labs showed no osteomyelitis.  However, I am highly suspicious of osteomyelitis of his right great toe. He is on doxycycline today. Wound is worse with increased drainage. Wound culture was taken  today. Will change antibiotic as necessary. Today we will order repeat labwork and an MRI of foot. Patient should now apply Aquacel Ag to the wound daily and cover with light gauze wrap. He will paint his wound with betadine and let dry in between dressing changes. We cut out a hole in his shoe insert in order to offload pressure onto the wound.  We discussed the importance of offloading the wound is much as possible as well as staying off the foot is much as possible. He will take off work the next week. We also discussed the importance of monitoring his blood glucose levels very closely as well is consuming a healthy diet with plenty of protein to allow for wound healing.  He will follow back up with us in 2 weeks for further evaluation.    Patient was given instructions and counseling regarding their condition or for health maintenance advice, as well as the wound care plan and recommendations. They understand and agree with the plan.  They will follow back up here in the clinic but are instructed to contact us in the interim should they have any new, returning, or worsening symptoms or concerns. Please see specific information pulled into the AVS if appropriate.     Dragon Dictation utilized for chart completion.    Follow Up   Return in about 2 weeks (around 11/1/2024) for Recheck.      Valentina Sinha PA-C

## 2024-10-18 NOTE — PROGRESS NOTES
Wound Location: Right great toe    Wound Exudate: moderate  Wound Debridement: Mechanically debrided with normal saline & gauze  Wound Thickness: full    Cleanse with:  NS or antibacterial soap and water    Primary: Aquacel ag  Secondary: 4x4 gauze  Secured with: tape  Romina-wound: Dry    Instructions: Cleanse wound with ns or antibacterial soap and water, pat dry, cut and apply Aquacel Ag to wound bed, wrap and cover the great toe with dry 4x4 gauze and secure with tape. Change Daily.     Education: Educated patient/family member/CG on how to apply new dressings, verbal understanding provided. Patient/family member/CG instructed to call with any questions or concerns

## 2024-10-21 LAB
BACTERIA SPEC AEROBE CULT: NORMAL
GRAM STN SPEC: NORMAL

## 2024-11-01 ENCOUNTER — OFFICE VISIT (OUTPATIENT)
Dept: WOUND CARE | Facility: HOSPITAL | Age: 64
End: 2024-11-01
Payer: COMMERCIAL

## 2024-11-01 VITALS
DIASTOLIC BLOOD PRESSURE: 70 MMHG | RESPIRATION RATE: 18 BRPM | SYSTOLIC BLOOD PRESSURE: 138 MMHG | HEART RATE: 82 BPM | TEMPERATURE: 97.2 F

## 2024-11-01 DIAGNOSIS — E11.621 DIABETIC ULCER OF TOE OF RIGHT FOOT ASSOCIATED WITH TYPE 2 DIABETES MELLITUS, WITH NECROSIS OF MUSCLE: Primary | ICD-10-CM

## 2024-11-01 DIAGNOSIS — L97.513 DIABETIC ULCER OF TOE OF RIGHT FOOT ASSOCIATED WITH TYPE 2 DIABETES MELLITUS, WITH NECROSIS OF MUSCLE: Primary | ICD-10-CM

## 2024-11-01 DIAGNOSIS — L03.115 CELLULITIS OF RIGHT LOWER EXTREMITY: ICD-10-CM

## 2024-11-01 PROCEDURE — G0463 HOSPITAL OUTPT CLINIC VISIT: HCPCS | Performed by: PHYSICIAN ASSISTANT

## 2024-11-01 NOTE — PROGRESS NOTES
Chief Complaint  Wound Check (Follow up visit for DFU to right great toe.  BS does not check.  HgA1C 8.1 done in PCP office earlier this year. Patient finished course of antibiotics.  Patient is using aquacel ag daily to wound.)    Subjective      History of Present Illness    Idris Park  is a 64 y.o. male here today for a wound on his right great toe. His toe wound developed 8 months ago without known injury. He finished Bactrim from his PCP and most recently finished 6 weeks of doxycycline from our clinic for suspected osteomyelitis. He previously has seen [facility] for several months.  They had suggested surgery but patient did not opt to have surgery.  He is on his feet 8 to 10 hours daily and works as a .  He is a diabetic and is on Ozempic.  He does not monitor his blood glucose levels regularly.  His most recent hemoglobin A1c was 8.1.  He currently is applying Aquacel Ag to the wound bed daily.  He notes that his drainage continues. We cut out a hole in his shoe insert at previous appointment in order to offload pressure onto the wound.  We ordered an MRI and lab work at his last appointment but he has not got this completed just yet.  His MRI is scheduled in 1 week.      Allergies:  Cephalexin      Current Outpatient Medications:     coenzyme Q10 100 MG capsule, Take 1 capsule by mouth Daily., Disp: , Rfl:     cyclobenzaprine (FLEXERIL) 10 MG tablet, Take 1 tablet by mouth 3 (Three) Times a Day As Needed for Muscle Spasms., Disp: 30 tablet, Rfl: 1    DULoxetine (CYMBALTA) 30 MG capsule, TAKE 1 CAPSULE BY MOUTH DAILY, Disp: 90 capsule, Rfl: 1    etodolac (LODINE) 300 MG capsule, TAKE 1 CAPSULE BY MOUTH TWICE A DAY WITH A MEAL, Disp: 180 capsule, Rfl: 1    gabapentin (NEURONTIN) 300 MG capsule, TAKE 1 CAPSULE BY MOUTH 3 TIMES A DAY, Disp: 270 capsule, Rfl: 0    hydroCHLOROthiazide (HYDRODIURIL) 25 MG tablet, TAKE ONE TABLET BY MOUTH DAILY, Disp: 90 tablet, Rfl: 1    rOPINIRole (REQUIP)  1 MG tablet, TAKE ONE TABLET BY MOUTH 1 HOUR BEFORE BEDTIME, Disp: 90 tablet, Rfl: 1    rosuvastatin (CRESTOR) 5 MG tablet, Take 1 tablet by mouth Daily., Disp: 90 tablet, Rfl: 1    Semaglutide,0.25 or 0.5MG/DOS, (Ozempic, 0.25 or 0.5 MG/DOSE,) 2 MG/3ML solution pen-injector, DIAL AND INJECT UNDER THE SKIN 0.25 MG WEEKLY, Disp: 3 mL, Rfl: 1    Past Medical History:   Diagnosis Date    Arthritis     COVID-19     Diabetes     Hyperlipidemia     Limb pain     Limb swelling      Past Surgical History:   Procedure Laterality Date    DENTAL PROCEDURE      EXTERNAL EAR SURGERY Bilateral     HERNIA REPAIR  1967    also 2011    JOINT REPLACEMENT Left     Artificial Joints/Limbs    KNEE ARTHROPLASTY Right     OTHER SURGICAL HISTORY      JOINT SURGERY     Social History     Socioeconomic History    Marital status:    Tobacco Use    Smoking status: Never     Passive exposure: Past    Smokeless tobacco: Never   Vaping Use    Vaping status: Never Used   Substance and Sexual Activity    Alcohol use: Not Currently     Comment: socially-BEER MONTHLY    Drug use: Never    Sexual activity: Yes     Partners: Female     Objective     Vitals:    11/01/24 0859   BP: 138/70   BP Location: Left arm   Patient Position: Sitting   Cuff Size: Adult   Pulse: 82   Resp: 18  Comment: 94% room air   Temp: 97.2 °F (36.2 °C)   TempSrc: Temporal   PainSc: 0-No pain     Review of Systems     ROS:  Per HPI.     I have reviewed the HPI and ROS as documented by MA/RN. Valentina Sinha PA-C    Physical Exam     NAD  AAOx3, pleasant, cooperative    Right great toe: Patient has a moderate sized open wound to the distal aspect of the patient's right great toe.  The wound is somewhat improved today. There is callous build up and maceration around wound today. This was peeled away. Wound base appears improved today.     See photos for details:    Right great toe:        Result Review :  The following data was reviewed by: Valentina Sinha  RUDY on 11/01/2024:    Prior notes and images.    Reviewed today:     Xray completed on 7/23/24 showed no osteomyelitis    Labwork completed on 7/27/24 showed normal Prealbumin/sed rate/CRP.          Assessment and Plan   Diagnoses and all orders for this visit:    1. Diabetic ulcer of toe of right foot associated with type 2 diabetes mellitus, with necrosis of muscle (Primary)    2. Cellulitis of right lower extremity    Wound is slightly improved today but still slow healing. I am highly suspicious of osteomyelitis of his right great toe. We have ordered repeat labwork and an MRI of foot. Patient should now apply Aquacel Ag to the wound daily and cover with light gauze wrap. He will paint his wound with betadine and let dry in between dressing changes. We cut out a hole in his shoe insert in order to offload pressure onto the wound.  We discussed the importance of offloading the wound is much as possible as well as staying off the foot is much as possible. He will take off work the next week. We also discussed the importance of monitoring his blood glucose levels very closely as well is consuming a healthy diet with plenty of protein to allow for wound healing.  He will follow back up with us in 2 weeks for further evaluation.    Patient was given instructions and counseling regarding their condition or for health maintenance advice, as well as the wound care plan and recommendations. They understand and agree with the plan.  They will follow back up here in the clinic but are instructed to contact us in the interim should they have any new, returning, or worsening symptoms or concerns. Please see specific information pulled into the AVS if appropriate.     Dragon Dictation utilized for chart completion.    Follow Up   Return in about 2 weeks (around 11/15/2024) for Recheck.      Valentina Sinha PA-C

## 2024-11-07 ENCOUNTER — HOSPITAL ENCOUNTER (OUTPATIENT)
Dept: MRI IMAGING | Facility: HOSPITAL | Age: 64
Discharge: HOME | End: 2024-11-07
Admitting: PHYSICIAN ASSISTANT
Payer: COMMERCIAL

## 2024-11-07 DIAGNOSIS — E11.621 DIABETIC ULCER OF TOE OF LEFT FOOT ASSOCIATED WITH TYPE 2 DIABETES MELLITUS, WITH NECROSIS OF BONE: ICD-10-CM

## 2024-11-07 DIAGNOSIS — L97.513 DIABETIC ULCER OF TOE OF RIGHT FOOT ASSOCIATED WITH TYPE 2 DIABETES MELLITUS, WITH NECROSIS OF MUSCLE: ICD-10-CM

## 2024-11-07 DIAGNOSIS — T14.8XXA WOUND INFECTION: ICD-10-CM

## 2024-11-07 DIAGNOSIS — L97.524 DIABETIC ULCER OF TOE OF LEFT FOOT ASSOCIATED WITH TYPE 2 DIABETES MELLITUS, WITH NECROSIS OF BONE: ICD-10-CM

## 2024-11-07 DIAGNOSIS — L03.115 CELLULITIS OF RIGHT LOWER EXTREMITY: ICD-10-CM

## 2024-11-07 DIAGNOSIS — L08.9 WOUND INFECTION: ICD-10-CM

## 2024-11-07 DIAGNOSIS — E11.621 DIABETIC ULCER OF TOE OF RIGHT FOOT ASSOCIATED WITH TYPE 2 DIABETES MELLITUS, WITH NECROSIS OF MUSCLE: ICD-10-CM

## 2024-11-07 LAB
CREAT BLDA-MCNC: 1.1 MG/DL (ref 0.6–1.3)
EGFRCR SERPLBLD CKD-EPI 2021: 75.4 ML/MIN/1.73

## 2024-11-07 PROCEDURE — 73720 MRI LWR EXTREMITY W/O&W/DYE: CPT

## 2024-11-07 PROCEDURE — 0 GADOBENATE DIMEGLUMINE 529 MG/ML SOLUTION: Performed by: PHYSICIAN ASSISTANT

## 2024-11-07 PROCEDURE — 82565 ASSAY OF CREATININE: CPT

## 2024-11-07 PROCEDURE — A9577 INJ MULTIHANCE: HCPCS | Performed by: PHYSICIAN ASSISTANT

## 2024-11-07 RX ADMIN — GADOBENATE DIMEGLUMINE 20 ML: 529 INJECTION, SOLUTION INTRAVENOUS at 10:08

## 2024-11-10 DIAGNOSIS — G89.29 CHRONIC LOW BACK PAIN, UNSPECIFIED BACK PAIN LATERALITY, UNSPECIFIED WHETHER SCIATICA PRESENT: ICD-10-CM

## 2024-11-10 DIAGNOSIS — M54.50 CHRONIC LOW BACK PAIN, UNSPECIFIED BACK PAIN LATERALITY, UNSPECIFIED WHETHER SCIATICA PRESENT: ICD-10-CM

## 2024-11-11 ENCOUNTER — LAB (OUTPATIENT)
Facility: HOSPITAL | Age: 64
End: 2024-11-11
Payer: COMMERCIAL

## 2024-11-11 DIAGNOSIS — E11.621 DIABETIC ULCER OF TOE OF LEFT FOOT ASSOCIATED WITH TYPE 2 DIABETES MELLITUS, WITH NECROSIS OF BONE: ICD-10-CM

## 2024-11-11 DIAGNOSIS — E11.621 DIABETIC ULCER OF TOE OF RIGHT FOOT ASSOCIATED WITH TYPE 2 DIABETES MELLITUS, WITH NECROSIS OF MUSCLE: ICD-10-CM

## 2024-11-11 DIAGNOSIS — L97.513 DIABETIC ULCER OF TOE OF RIGHT FOOT ASSOCIATED WITH TYPE 2 DIABETES MELLITUS, WITH NECROSIS OF MUSCLE: ICD-10-CM

## 2024-11-11 DIAGNOSIS — L08.9 WOUND INFECTION: ICD-10-CM

## 2024-11-11 DIAGNOSIS — L97.524 DIABETIC ULCER OF TOE OF LEFT FOOT ASSOCIATED WITH TYPE 2 DIABETES MELLITUS, WITH NECROSIS OF BONE: ICD-10-CM

## 2024-11-11 DIAGNOSIS — L03.115 CELLULITIS OF RIGHT LOWER EXTREMITY: ICD-10-CM

## 2024-11-11 DIAGNOSIS — T14.8XXA WOUND INFECTION: ICD-10-CM

## 2024-11-11 LAB
ALBUMIN SERPL-MCNC: 4.3 G/DL (ref 3.5–5.2)
ALBUMIN/GLOB SERPL: 1.4 G/DL
ALP SERPL-CCNC: 134 U/L (ref 39–117)
ALT SERPL W P-5'-P-CCNC: 19 U/L (ref 1–41)
ANION GAP SERPL CALCULATED.3IONS-SCNC: 11.2 MMOL/L (ref 5–15)
AST SERPL-CCNC: 24 U/L (ref 1–40)
BASOPHILS # BLD AUTO: 0.06 10*3/MM3 (ref 0–0.2)
BASOPHILS NFR BLD AUTO: 0.7 % (ref 0–1.5)
BILIRUB SERPL-MCNC: 0.3 MG/DL (ref 0–1.2)
BUN SERPL-MCNC: 17 MG/DL (ref 8–23)
BUN/CREAT SERPL: 15.9 (ref 7–25)
CALCIUM SPEC-SCNC: 9.9 MG/DL (ref 8.6–10.5)
CHLORIDE SERPL-SCNC: 100 MMOL/L (ref 98–107)
CO2 SERPL-SCNC: 27.8 MMOL/L (ref 22–29)
CREAT SERPL-MCNC: 1.07 MG/DL (ref 0.76–1.27)
CRP SERPL-MCNC: 0.73 MG/DL (ref 0–0.5)
DEPRECATED RDW RBC AUTO: 41.4 FL (ref 37–54)
EGFRCR SERPLBLD CKD-EPI 2021: 78 ML/MIN/1.73
EOSINOPHIL # BLD AUTO: 0.23 10*3/MM3 (ref 0–0.4)
EOSINOPHIL NFR BLD AUTO: 2.6 % (ref 0.3–6.2)
ERYTHROCYTE [DISTWIDTH] IN BLOOD BY AUTOMATED COUNT: 12.9 % (ref 12.3–15.4)
ERYTHROCYTE [SEDIMENTATION RATE] IN BLOOD: 8 MM/HR (ref 0–20)
GLOBULIN UR ELPH-MCNC: 3.1 GM/DL
GLUCOSE SERPL-MCNC: 160 MG/DL (ref 65–99)
HCT VFR BLD AUTO: 44.7 % (ref 37.5–51)
HGB BLD-MCNC: 15 G/DL (ref 13–17.7)
IMM GRANULOCYTES # BLD AUTO: 0.05 10*3/MM3 (ref 0–0.05)
IMM GRANULOCYTES NFR BLD AUTO: 0.6 % (ref 0–0.5)
LYMPHOCYTES # BLD AUTO: 1.55 10*3/MM3 (ref 0.7–3.1)
LYMPHOCYTES NFR BLD AUTO: 17.3 % (ref 19.6–45.3)
MCH RBC QN AUTO: 29.9 PG (ref 26.6–33)
MCHC RBC AUTO-ENTMCNC: 33.6 G/DL (ref 31.5–35.7)
MCV RBC AUTO: 89.2 FL (ref 79–97)
MONOCYTES # BLD AUTO: 0.69 10*3/MM3 (ref 0.1–0.9)
MONOCYTES NFR BLD AUTO: 7.7 % (ref 5–12)
NEUTROPHILS NFR BLD AUTO: 6.4 10*3/MM3 (ref 1.7–7)
NEUTROPHILS NFR BLD AUTO: 71.1 % (ref 42.7–76)
NRBC BLD AUTO-RTO: 0 /100 WBC (ref 0–0.2)
PLATELET # BLD AUTO: 266 10*3/MM3 (ref 140–450)
PMV BLD AUTO: 9.8 FL (ref 6–12)
POTASSIUM SERPL-SCNC: 5.4 MMOL/L (ref 3.5–5.2)
PREALB SERPL-MCNC: 26.4 MG/DL (ref 20–40)
PROT SERPL-MCNC: 7.4 G/DL (ref 6–8.5)
RBC # BLD AUTO: 5.01 10*6/MM3 (ref 4.14–5.8)
SODIUM SERPL-SCNC: 139 MMOL/L (ref 136–145)
WBC NRBC COR # BLD AUTO: 8.98 10*3/MM3 (ref 3.4–10.8)

## 2024-11-11 PROCEDURE — 80053 COMPREHEN METABOLIC PANEL: CPT

## 2024-11-11 PROCEDURE — 85652 RBC SED RATE AUTOMATED: CPT

## 2024-11-11 PROCEDURE — 84134 ASSAY OF PREALBUMIN: CPT

## 2024-11-11 PROCEDURE — 86140 C-REACTIVE PROTEIN: CPT

## 2024-11-11 PROCEDURE — 85025 COMPLETE CBC W/AUTO DIFF WBC: CPT

## 2024-11-11 PROCEDURE — 36415 COLL VENOUS BLD VENIPUNCTURE: CPT

## 2024-11-11 RX ORDER — GABAPENTIN 300 MG/1
300 CAPSULE ORAL 3 TIMES DAILY
Qty: 270 CAPSULE | Refills: 0 | Status: SHIPPED | OUTPATIENT
Start: 2024-11-11

## 2024-11-11 NOTE — TELEPHONE ENCOUNTER
UPCOMING APPTS  With Family Medicine (LORENA Olivares)  12/02/2024 at 7:30 AM  LAST OFFICE VISIT - THIS DEPT  8/23/2024 Dae Yna APRN    Uds 5/29/24

## 2024-11-20 ENCOUNTER — OFFICE VISIT (OUTPATIENT)
Dept: WOUND CARE | Facility: HOSPITAL | Age: 64
End: 2024-11-20
Payer: COMMERCIAL

## 2024-11-20 VITALS
TEMPERATURE: 98.9 F | RESPIRATION RATE: 18 BRPM | DIASTOLIC BLOOD PRESSURE: 62 MMHG | SYSTOLIC BLOOD PRESSURE: 122 MMHG | HEART RATE: 71 BPM

## 2024-11-20 DIAGNOSIS — L03.115 CELLULITIS OF RIGHT LOWER EXTREMITY: ICD-10-CM

## 2024-11-20 DIAGNOSIS — E11.621 DIABETIC ULCER OF TOE OF RIGHT FOOT ASSOCIATED WITH TYPE 2 DIABETES MELLITUS, WITH NECROSIS OF MUSCLE: Primary | ICD-10-CM

## 2024-11-20 DIAGNOSIS — L97.513 DIABETIC ULCER OF TOE OF RIGHT FOOT ASSOCIATED WITH TYPE 2 DIABETES MELLITUS, WITH NECROSIS OF MUSCLE: Primary | ICD-10-CM

## 2024-11-20 PROCEDURE — G0463 HOSPITAL OUTPT CLINIC VISIT: HCPCS | Performed by: PHYSICIAN ASSISTANT

## 2024-11-20 NOTE — PROGRESS NOTES
Chief Complaint  Wound Check (Right great toe wound-states its improved.  Diabetic but does not check sugar.)    Subjective      History of Present Illness    Idris Park  is a 64 y.o. male here today for a wound on his right great toe. His toe wound developed several months ago without known injury. He finished Bactrim from his PCP and 6 weeks of doxycycline from our clinic for suspected osteomyelitis. He previously has seen [facility] for several months.  They had suggested surgery but patient did not opt to have surgery.  He is on his feet 8 to 10 hours daily and works as a .  He is a diabetic and is on Ozempic.  He does not monitor his blood glucose levels regularly.  His most recent hemoglobin A1c was 8.1.  He currently is applying Aquacel Ag to the wound bed daily.  He notes that his drainage continues but has decreased recently. We cut out a hole in his shoe insert at previous appointment in order to offload pressure onto the wound.  His wound has improved today and is more dry.    MRI of right foot completed on 11/7/2024 revealed 1. Susceptibility artifact limiting the exam. There does appear to be some focal edema at the tip of the first distal phalanx with adjacent soft tissue skin ulceration and edema for which osteomyelitis cannot be excluded.     Labs on 11/11/24 revealed an elevated CRP.  Due to this fact, patient was placed on 2 additional weeks of Augmentin to treat for assumed osteomyelitis.    Allergies:  Cephalexin      Current Outpatient Medications:     amoxicillin-clavulanate (AUGMENTIN) 875-125 MG per tablet, Take 1 tablet by mouth 2 (Two) Times a Day for 14 days., Disp: 28 tablet, Rfl: 0    coenzyme Q10 100 MG capsule, Take 1 capsule by mouth Daily., Disp: , Rfl:     cyclobenzaprine (FLEXERIL) 10 MG tablet, Take 1 tablet by mouth 3 (Three) Times a Day As Needed for Muscle Spasms., Disp: 30 tablet, Rfl: 1    DULoxetine (CYMBALTA) 30 MG capsule, TAKE 1 CAPSULE BY MOUTH  DAILY, Disp: 90 capsule, Rfl: 1    etodolac (LODINE) 300 MG capsule, TAKE 1 CAPSULE BY MOUTH TWICE A DAY WITH A MEAL, Disp: 180 capsule, Rfl: 1    gabapentin (NEURONTIN) 300 MG capsule, TAKE 1 CAPSULE BY MOUTH 3 TIMES A DAY, Disp: 270 capsule, Rfl: 0    hydroCHLOROthiazide (HYDRODIURIL) 25 MG tablet, TAKE ONE TABLET BY MOUTH DAILY, Disp: 90 tablet, Rfl: 1    rOPINIRole (REQUIP) 1 MG tablet, TAKE ONE TABLET BY MOUTH 1 HOUR BEFORE BEDTIME, Disp: 90 tablet, Rfl: 1    rosuvastatin (CRESTOR) 5 MG tablet, Take 1 tablet by mouth Daily., Disp: 90 tablet, Rfl: 1    Semaglutide,0.25 or 0.5MG/DOS, (Ozempic, 0.25 or 0.5 MG/DOSE,) 2 MG/3ML solution pen-injector, DIAL AND INJECT UNDER THE SKIN 0.25 MG WEEKLY, Disp: 3 mL, Rfl: 1    Past Medical History:   Diagnosis Date    Arthritis     COVID-19     Diabetes     Hyperlipidemia     Limb pain     Limb swelling      Past Surgical History:   Procedure Laterality Date    DENTAL PROCEDURE      EXTERNAL EAR SURGERY Bilateral     HERNIA REPAIR  1967    also 2011    JOINT REPLACEMENT Left     Artificial Joints/Limbs    KNEE ARTHROPLASTY Right     OTHER SURGICAL HISTORY      JOINT SURGERY     Social History     Socioeconomic History    Marital status:    Tobacco Use    Smoking status: Never     Passive exposure: Past    Smokeless tobacco: Never   Vaping Use    Vaping status: Never Used   Substance and Sexual Activity    Alcohol use: Not Currently     Comment: socially-BEER MONTHLY    Drug use: Never    Sexual activity: Yes     Partners: Female     Objective     Vitals:    11/20/24 0857   BP: 122/62   BP Location: Left arm   Patient Position: Sitting   Cuff Size: Adult   Pulse: 71   Resp: 18   Temp: 98.9 °F (37.2 °C)   TempSrc: Temporal   PainSc: 0-No pain     Review of Systems     ROS:  Per HPI.     I have reviewed the HPI and ROS as documented by MA/RN. Valentina Sinha PA-C    Physical Exam     NAD  AAOx3, pleasant, cooperative    Right great toe: Patient has a moderate sized  open wound to the distal aspect of the patient's right great toe.  The wound is somewhat improved today and smaller.  The base appears more drier today.  There is callous build up and maceration around wound today. This was peeled away. Wound base appears improved today.     See photos for details:    Right great toe:      Result Review :  The following data was reviewed by: Valentina Sinha PA-C on 11/20/2024:    Prior notes and images.    Reviewed today:     Xray completed on 7/23/24 showed no osteomyelitis    Labwork completed on 7/27/24 showed normal Prealbumin/sed rate/CRP.          Assessment and Plan   Diagnoses and all orders for this visit:    1. Diabetic ulcer of toe of right foot associated with type 2 diabetes mellitus, with necrosis of muscle (Primary)    2. Cellulitis of right lower extremity    Wound is slightly improved today but still slow healing.  We are treating patient for assumed osteomyelitis.  He is continuing 2 additional weeks of Augmentin for osteomyelitis treatment.  Patient should now apply Cierra Ag to the wound daily and cover with light gauze wrap. We cut out a hole in his shoe insert in order to offload pressure onto the wound.  We discussed the importance of offloading the wound is much as possible as well as staying off the foot is much as possible. We also discussed the importance of monitoring his blood glucose levels very closely as well is consuming a healthy diet with plenty of protein to allow for wound healing.  He will follow back up with us in 2 weeks for further evaluation.    Patient was given instructions and counseling regarding their condition or for health maintenance advice, as well as the wound care plan and recommendations. They understand and agree with the plan.  They will follow back up here in the clinic but are instructed to contact us in the interim should they have any new, returning, or worsening symptoms or concerns. Please see specific information  pulled into the AVS if appropriate.     Dragon Dictation utilized for chart completion.    Follow Up   Return in about 2 weeks (around 12/4/2024) for Recheck.      Valentina Sinha PA-C

## 2024-11-29 ENCOUNTER — LAB (OUTPATIENT)
Facility: HOSPITAL | Age: 64
End: 2024-11-29
Payer: COMMERCIAL

## 2024-11-29 DIAGNOSIS — E11.65 TYPE 2 DIABETES MELLITUS WITH HYPERGLYCEMIA, WITHOUT LONG-TERM CURRENT USE OF INSULIN: ICD-10-CM

## 2024-11-29 LAB
ALBUMIN SERPL-MCNC: 4.3 G/DL (ref 3.5–5.2)
ALBUMIN UR-MCNC: <1.2 MG/DL
ALBUMIN/GLOB SERPL: 1.5 G/DL
ALP SERPL-CCNC: 116 U/L (ref 39–117)
ALT SERPL W P-5'-P-CCNC: 13 U/L (ref 1–41)
ANION GAP SERPL CALCULATED.3IONS-SCNC: 9 MMOL/L (ref 5–15)
AST SERPL-CCNC: 18 U/L (ref 1–40)
BASOPHILS # BLD AUTO: 0.07 10*3/MM3 (ref 0–0.2)
BASOPHILS NFR BLD AUTO: 1.1 % (ref 0–1.5)
BILIRUB SERPL-MCNC: 0.6 MG/DL (ref 0–1.2)
BILIRUB UR QL STRIP: NEGATIVE
BUN SERPL-MCNC: 8 MG/DL (ref 8–23)
BUN/CREAT SERPL: 8.5 (ref 7–25)
CALCIUM SPEC-SCNC: 9.5 MG/DL (ref 8.6–10.5)
CHLORIDE SERPL-SCNC: 102 MMOL/L (ref 98–107)
CHOLEST SERPL-MCNC: 219 MG/DL (ref 0–200)
CLARITY UR: CLEAR
CO2 SERPL-SCNC: 28 MMOL/L (ref 22–29)
COLOR UR: YELLOW
CREAT SERPL-MCNC: 0.94 MG/DL (ref 0.76–1.27)
CREAT UR-MCNC: 60 MG/DL
DEPRECATED RDW RBC AUTO: 40.1 FL (ref 37–54)
EGFRCR SERPLBLD CKD-EPI 2021: 91.1 ML/MIN/1.73
EOSINOPHIL # BLD AUTO: 0.32 10*3/MM3 (ref 0–0.4)
EOSINOPHIL NFR BLD AUTO: 5.1 % (ref 0.3–6.2)
ERYTHROCYTE [DISTWIDTH] IN BLOOD BY AUTOMATED COUNT: 12.4 % (ref 12.3–15.4)
GLOBULIN UR ELPH-MCNC: 2.8 GM/DL
GLUCOSE SERPL-MCNC: 132 MG/DL (ref 65–99)
GLUCOSE UR STRIP-MCNC: NEGATIVE MG/DL
HBA1C MFR BLD: 6.7 % (ref 4.8–5.6)
HCT VFR BLD AUTO: 46.7 % (ref 37.5–51)
HDLC SERPL-MCNC: 38 MG/DL (ref 40–60)
HGB BLD-MCNC: 15.7 G/DL (ref 13–17.7)
HGB UR QL STRIP.AUTO: NEGATIVE
HOLD SPECIMEN: NORMAL
IMM GRANULOCYTES # BLD AUTO: 0.01 10*3/MM3 (ref 0–0.05)
IMM GRANULOCYTES NFR BLD AUTO: 0.2 % (ref 0–0.5)
KETONES UR QL STRIP: NEGATIVE
LDLC SERPL CALC-MCNC: 146 MG/DL (ref 0–100)
LDLC/HDLC SERPL: 3.75 {RATIO}
LEUKOCYTE ESTERASE UR QL STRIP.AUTO: NEGATIVE
LYMPHOCYTES # BLD AUTO: 1.65 10*3/MM3 (ref 0.7–3.1)
LYMPHOCYTES NFR BLD AUTO: 26.5 % (ref 19.6–45.3)
MCH RBC QN AUTO: 29.7 PG (ref 26.6–33)
MCHC RBC AUTO-ENTMCNC: 33.6 G/DL (ref 31.5–35.7)
MCV RBC AUTO: 88.3 FL (ref 79–97)
MICROALBUMIN/CREAT UR: NORMAL MG/G{CREAT}
MONOCYTES # BLD AUTO: 0.56 10*3/MM3 (ref 0.1–0.9)
MONOCYTES NFR BLD AUTO: 9 % (ref 5–12)
NEUTROPHILS NFR BLD AUTO: 3.62 10*3/MM3 (ref 1.7–7)
NEUTROPHILS NFR BLD AUTO: 58.1 % (ref 42.7–76)
NITRITE UR QL STRIP: NEGATIVE
NRBC BLD AUTO-RTO: 0 /100 WBC (ref 0–0.2)
PH UR STRIP.AUTO: 7 [PH] (ref 5–8)
PLATELET # BLD AUTO: 251 10*3/MM3 (ref 140–450)
PMV BLD AUTO: 10 FL (ref 6–12)
POTASSIUM SERPL-SCNC: 4.8 MMOL/L (ref 3.5–5.2)
PROT SERPL-MCNC: 7.1 G/DL (ref 6–8.5)
PROT UR QL STRIP: NEGATIVE
RBC # BLD AUTO: 5.29 10*6/MM3 (ref 4.14–5.8)
SODIUM SERPL-SCNC: 139 MMOL/L (ref 136–145)
SP GR UR STRIP: 1.01 (ref 1–1.03)
TRIGL SERPL-MCNC: 192 MG/DL (ref 0–150)
UROBILINOGEN UR QL STRIP: NORMAL
VLDLC SERPL-MCNC: 35 MG/DL (ref 5–40)
WBC NRBC COR # BLD AUTO: 6.23 10*3/MM3 (ref 3.4–10.8)

## 2024-11-29 PROCEDURE — 80053 COMPREHEN METABOLIC PANEL: CPT

## 2024-11-29 PROCEDURE — 36415 COLL VENOUS BLD VENIPUNCTURE: CPT

## 2024-11-29 PROCEDURE — 80061 LIPID PANEL: CPT

## 2024-11-29 PROCEDURE — 85025 COMPLETE CBC W/AUTO DIFF WBC: CPT

## 2024-11-29 PROCEDURE — 81003 URINALYSIS AUTO W/O SCOPE: CPT

## 2024-11-29 PROCEDURE — 83036 HEMOGLOBIN GLYCOSYLATED A1C: CPT

## 2024-11-29 PROCEDURE — 82570 ASSAY OF URINE CREATININE: CPT

## 2024-11-29 PROCEDURE — 82043 UR ALBUMIN QUANTITATIVE: CPT

## 2024-12-02 ENCOUNTER — OFFICE VISIT (OUTPATIENT)
Dept: FAMILY MEDICINE CLINIC | Facility: CLINIC | Age: 64
End: 2024-12-02
Payer: COMMERCIAL

## 2024-12-02 VITALS
DIASTOLIC BLOOD PRESSURE: 86 MMHG | RESPIRATION RATE: 18 BRPM | WEIGHT: 230.4 LBS | HEIGHT: 69 IN | HEART RATE: 88 BPM | BODY MASS INDEX: 34.13 KG/M2 | OXYGEN SATURATION: 99 % | TEMPERATURE: 98.6 F | SYSTOLIC BLOOD PRESSURE: 138 MMHG

## 2024-12-02 DIAGNOSIS — E11.65 TYPE 2 DIABETES MELLITUS WITH HYPERGLYCEMIA, WITHOUT LONG-TERM CURRENT USE OF INSULIN: ICD-10-CM

## 2024-12-02 DIAGNOSIS — Z00.00 ANNUAL PHYSICAL EXAM: Primary | ICD-10-CM

## 2024-12-02 DIAGNOSIS — E78.5 HYPERLIPIDEMIA, UNSPECIFIED HYPERLIPIDEMIA TYPE: ICD-10-CM

## 2024-12-02 DIAGNOSIS — M19.90 ARTHRITIS: ICD-10-CM

## 2024-12-02 PROCEDURE — 99396 PREV VISIT EST AGE 40-64: CPT | Performed by: NURSE PRACTITIONER

## 2024-12-02 RX ORDER — ROSUVASTATIN CALCIUM 5 MG/1
5 TABLET, COATED ORAL DAILY
Qty: 90 TABLET | Refills: 3 | Status: SHIPPED | OUTPATIENT
Start: 2024-12-02

## 2024-12-02 RX ORDER — SEMAGLUTIDE 0.68 MG/ML
0.25 INJECTION, SOLUTION SUBCUTANEOUS WEEKLY
Qty: 3 ML | Refills: 1 | Status: SHIPPED | OUTPATIENT
Start: 2024-12-02

## 2024-12-02 NOTE — PROGRESS NOTES
Chief Complaint  Diabetes, Restless Legs Syndrome, Hyperlipidemia, Hypertension, and Annual Exam    Subjective        Idris Park presents to [facility]  History of Present Illness  Diabetes:  doing well controlled at 6.7%    RLS:  doing well     Hyperlipidemia:  doing well     Annual exam:  Diabetes  Pertinent negatives for hypoglycemia include no dizziness. Pertinent negatives for diabetes include no chest pain and no weakness.   Hyperlipidemia  Pertinent negatives include no chest pain or shortness of breath.   Hypertension  Pertinent negatives include no chest pain, palpitations or shortness of breath.       The following portions of the patient's history were personally reviewed and updated as appropriate: allergies, current medications, past medical history, past surgical history, past family history, and past social history.     Body mass index is 34.01 kg/m².           Past History:    Medical History: has a past medical history of Arthritis, COVID-19, Diabetes, Hyperlipidemia, Limb pain, and Limb swelling.     Surgical History: has a past surgical history that includes Joint replacement (Left); Hernia repair (1967); Other surgical history; Dental surgery; External ear surgery (Bilateral); and Knee Arthroplasty (Right).     Family History: family history includes Breast cancer (age of onset: 50) in his mother; Cancer in his mother.     Social History: reports that he has never smoked. He has been exposed to tobacco smoke. He has never used smokeless tobacco. He reports that he does not currently use alcohol. He reports that he does not use drugs.    Allergies: Cephalexin          Current Outpatient Medications:     cyclobenzaprine (FLEXERIL) 10 MG tablet, Take 1 tablet by mouth 3 (Three) Times a Day As Needed for Muscle Spasms., Disp: 30 tablet, Rfl: 1    DULoxetine (CYMBALTA) 30 MG capsule, TAKE 1 CAPSULE BY MOUTH DAILY, Disp: 90 capsule, Rfl: 1    etodolac (LODINE) 300  "MG capsule, TAKE 1 CAPSULE BY MOUTH TWICE A DAY WITH A MEAL, Disp: 180 capsule, Rfl: 1    gabapentin (NEURONTIN) 300 MG capsule, TAKE 1 CAPSULE BY MOUTH 3 TIMES A DAY, Disp: 270 capsule, Rfl: 0    rosuvastatin (CRESTOR) 5 MG tablet, Take 1 tablet by mouth Daily., Disp: 90 tablet, Rfl: 3    Semaglutide,0.25 or 0.5MG/DOS, (Ozempic, 0.25 or 0.5 MG/DOSE,) 2 MG/3ML solution pen-injector, Inject 0.25 mg under the skin into the appropriate area as directed 1 (One) Time Per Week., Disp: 3 mL, Rfl: 1    Medications Discontinued During This Encounter   Medication Reason    coenzyme Q10 100 MG capsule *Therapy completed    rosuvastatin (CRESTOR) 5 MG tablet *Therapy completed    rOPINIRole (REQUIP) 1 MG tablet *Therapy completed    hydroCHLOROthiazide (HYDRODIURIL) 25 MG tablet *Therapy completed    Semaglutide,0.25 or 0.5MG/DOS, (Ozempic, 0.25 or 0.5 MG/DOSE,) 2 MG/3ML solution pen-injector Reorder         Review of Systems   Constitutional:  Negative for fever.   Respiratory:  Negative for cough and shortness of breath.    Cardiovascular:  Negative for chest pain, palpitations and leg swelling.   Neurological:  Negative for dizziness, weakness, numbness and headache.        Objective         Vitals:    12/02/24 0731   BP: 138/86   BP Location: Right arm   Patient Position: Sitting   Cuff Size: Adult   Pulse: 88   Resp: 18   Temp: 98.6 °F (37 °C)   TempSrc: Temporal   SpO2: 99%   Weight: 105 kg (230 lb 6.4 oz)   Height: 175.3 cm (69.02\")     Body mass index is 34.01 kg/m².         Physical Exam  Vitals reviewed.   Constitutional:       Appearance: Normal appearance. He is well-developed.   HENT:      Head: Normocephalic and atraumatic.      Mouth/Throat:      Pharynx: No oropharyngeal exudate.   Eyes:      Conjunctiva/sclera: Conjunctivae normal.      Pupils: Pupils are equal, round, and reactive to light.   Cardiovascular:      Rate and Rhythm: Normal rate and regular rhythm.      Heart sounds: Normal heart sounds. No murmur " heard.     No friction rub. No gallop.   Pulmonary:      Effort: Pulmonary effort is normal.      Breath sounds: Normal breath sounds. No wheezing or rhonchi.   Skin:     General: Skin is warm and dry.   Neurological:      Mental Status: He is alert and oriented to person, place, and time.   Psychiatric:         Mood and Affect: Mood and affect normal.         Behavior: Behavior normal.         Thought Content: Thought content normal.         Judgment: Judgment normal.             Result Review :  The 10-year ASCVD risk score (Juve MANZO, et al., 2019) is: 28.1%    Values used to calculate the score:      Age: 64 years      Sex: Male      Is Non- : No      Diabetic: Yes      Tobacco smoker: No      Systolic Blood Pressure: 138 mmHg      Is BP treated: No      HDL Cholesterol: 38 mg/dL      Total Cholesterol: 219 mg/dL               Assessment and Plan     Diagnoses and all orders for this visit:    1. Annual physical exam (Primary)  Comments:  discussed diet and exercise.    2. Hyperlipidemia, unspecified hyperlipidemia type    3. Type 2 diabetes mellitus with hyperglycemia, without long-term current use of insulin  -     CBC & Differential; Future  -     Comprehensive Metabolic Panel; Future  -     Hemoglobin A1c; Future  -     Urinalysis With Culture If Indicated -; Future  -     Lipid Panel; Future  -     Microalbumin / Creatinine Urine Ratio - Urine, Clean Catch; Future  -     rosuvastatin (CRESTOR) 5 MG tablet; Take 1 tablet by mouth Daily.  Dispense: 90 tablet; Refill: 3  -     Semaglutide,0.25 or 0.5MG/DOS, (Ozempic, 0.25 or 0.5 MG/DOSE,) 2 MG/3ML solution pen-injector; Inject 0.25 mg under the skin into the appropriate area as directed 1 (One) Time Per Week.  Dispense: 3 mL; Refill: 1    4. Arthritis    5. Type 2 diabetes mellitus with hyperglycemia, without long-term current use of insulin  Comments:  continue ozempic  Orders:  -     CBC & Differential; Future  -     Comprehensive  Metabolic Panel; Future  -     Hemoglobin A1c; Future  -     Urinalysis With Culture If Indicated -; Future  -     Lipid Panel; Future  -     Microalbumin / Creatinine Urine Ratio - Urine, Clean Catch; Future  -     rosuvastatin (CRESTOR) 5 MG tablet; Take 1 tablet by mouth Daily.  Dispense: 90 tablet; Refill: 3  -     Semaglutide,0.25 or 0.5MG/DOS, (Ozempic, 0.25 or 0.5 MG/DOSE,) 2 MG/3ML solution pen-injector; Inject 0.25 mg under the skin into the appropriate area as directed 1 (One) Time Per Week.  Dispense: 3 mL; Refill: 1              Follow Up     Return in about 6 months (around 6/2/2025).    Patient was given instructions and counseling regarding his condition or for health maintenance advice. Please see specific information pulled into the AVS if appropriate.

## 2024-12-03 ENCOUNTER — TELEPHONE (OUTPATIENT)
Dept: FAMILY MEDICINE CLINIC | Facility: CLINIC | Age: 64
End: 2024-12-03
Payer: COMMERCIAL

## 2024-12-10 ENCOUNTER — OFFICE VISIT (OUTPATIENT)
Dept: WOUND CARE | Facility: HOSPITAL | Age: 64
End: 2024-12-10
Payer: COMMERCIAL

## 2024-12-10 VITALS
HEART RATE: 76 BPM | TEMPERATURE: 97.6 F | RESPIRATION RATE: 18 BRPM | DIASTOLIC BLOOD PRESSURE: 76 MMHG | SYSTOLIC BLOOD PRESSURE: 140 MMHG

## 2024-12-10 DIAGNOSIS — E11.621 DIABETIC ULCER OF TOE OF RIGHT FOOT ASSOCIATED WITH TYPE 2 DIABETES MELLITUS, WITH NECROSIS OF MUSCLE: Primary | ICD-10-CM

## 2024-12-10 DIAGNOSIS — L97.513 DIABETIC ULCER OF TOE OF RIGHT FOOT ASSOCIATED WITH TYPE 2 DIABETES MELLITUS, WITH NECROSIS OF MUSCLE: Primary | ICD-10-CM

## 2024-12-10 PROCEDURE — G0463 HOSPITAL OUTPT CLINIC VISIT: HCPCS | Performed by: PHYSICIAN ASSISTANT

## 2024-12-10 NOTE — PROGRESS NOTES
Chief Complaint  Wound Check (Follow up visit for DFU to right great toe.  Patient does not check BS routinely.  HgA1C 6.7.)    Subjective      History of Present Illness    Idris Park  is a 64 y.o. male here again today for a wound on his right great toe. His toe wound developed several months ago without known injury. He finished Bactrim from his PCP and 6 weeks of doxycycline from our clinic for suspected osteomyelitis. He previously has seen [facility] for several months.  They had suggested surgery but patient did not opt to have surgery.  He is on his feet 8 to 10 hours daily and works as a .  He is a diabetic and is on Ozempic.  He does not monitor his blood glucose levels regularly.  His most recent hemoglobin A1c was 8.1.  He currently is currently applying Cierra Ag to wound bed daily.  We cut out a hole in his shoe insert at previous appointment in order to offload pressure onto the wound.  His wound is again improved today and is more dry.    MRI of right foot completed on 11/7/2024 revealed 1. Susceptibility artifact limiting the exam. There does appear to be some focal edema at the tip of the first distal phalanx with adjacent soft tissue skin ulceration and edema for which osteomyelitis cannot be excluded.     Labs on 11/11/24 revealed an elevated CRP.  Due to this fact, patient was placed on 2 additional weeks of Augmentin to treat for assumed osteomyelitis.  He has now finished this.    Allergies:  Cephalexin      Current Outpatient Medications:     cyclobenzaprine (FLEXERIL) 10 MG tablet, Take 1 tablet by mouth 3 (Three) Times a Day As Needed for Muscle Spasms., Disp: 30 tablet, Rfl: 1    DULoxetine (CYMBALTA) 30 MG capsule, TAKE 1 CAPSULE BY MOUTH DAILY, Disp: 90 capsule, Rfl: 1    etodolac (LODINE) 300 MG capsule, TAKE 1 CAPSULE BY MOUTH TWICE A DAY WITH A MEAL, Disp: 180 capsule, Rfl: 1    gabapentin (NEURONTIN) 300 MG capsule, TAKE 1 CAPSULE BY MOUTH 3 TIMES A DAY, Disp:  270 capsule, Rfl: 0    rosuvastatin (CRESTOR) 5 MG tablet, Take 1 tablet by mouth Daily., Disp: 90 tablet, Rfl: 3    Semaglutide,0.25 or 0.5MG/DOS, (Ozempic, 0.25 or 0.5 MG/DOSE,) 2 MG/3ML solution pen-injector, Inject 0.25 mg under the skin into the appropriate area as directed 1 (One) Time Per Week., Disp: 3 mL, Rfl: 1    Past Medical History:   Diagnosis Date    Arthritis     COVID-19     Diabetes     Hyperlipidemia     Limb pain     Limb swelling      Past Surgical History:   Procedure Laterality Date    DENTAL PROCEDURE      EXTERNAL EAR SURGERY Bilateral     HERNIA REPAIR  1967    also 2011    JOINT REPLACEMENT Left     Artificial Joints/Limbs    KNEE ARTHROPLASTY Right     OTHER SURGICAL HISTORY      JOINT SURGERY     Social History     Socioeconomic History    Marital status:    Tobacco Use    Smoking status: Never     Passive exposure: Past    Smokeless tobacco: Never   Vaping Use    Vaping status: Never Used   Substance and Sexual Activity    Alcohol use: Not Currently     Comment: socially-BEER MONTHLY    Drug use: Never    Sexual activity: Yes     Partners: Female     Objective     Vitals:    12/10/24 0905   BP: 140/76   BP Location: Left arm   Patient Position: Sitting   Cuff Size: Adult   Pulse: 76   Resp: 18  Comment: 97% room air   Temp: 97.6 °F (36.4 °C)   TempSrc: Temporal   PainSc: 0-No pain     Review of Systems     ROS:  Per HPI.     I have reviewed the HPI and ROS as documented by MA/RN. Valentina Trotter PA-C    Physical Exam     NAD  AAOx3, pleasant, cooperative    Right great toe: Patient has a moderate sized open wound to the distal aspect of the patient's right great toe.  The wound is improved today and smaller.  The base appears more dry today.  There is callous build up and maceration around wound today. This was peeled away. Wound base appears stalled today.  Silver nitrate was applied.      See photos for details:    Right great toe:      Result Review :  The following data  was reviewed by: Valentina Trotter PA-C on 12/10/2024:    Prior notes and images.    Reviewed today:     Xray completed on 7/23/24 showed no osteomyelitis    Labwork completed on 7/27/24 showed normal Prealbumin/sed rate/CRP.          Assessment and Plan   Diagnoses and all orders for this visit:    1. Diabetic ulcer of toe of right foot associated with type 2 diabetes mellitus, with necrosis of muscle (Primary)      Wound is improved today but still slow healing.  We are treating patient for assumed osteomyelitis.  He has now finished Augmentin for osteomyelitis treatment.  Patient should continue to apply Cierra Ag to the wound daily and cover with light gauze wrap. We did cut out a hole in his shoe insert in order to offload pressure onto the wound.  We discussed the importance of offloading the wound is much as possible as well as staying off the foot is much as possible. We also discussed the importance of monitoring his blood glucose levels very closely as well is consuming a healthy diet with plenty of protein to allow for wound healing.  He will follow back up with us in 23weeks for further evaluation.    Patient was given instructions and counseling regarding their condition or for health maintenance advice, as well as the wound care plan and recommendations. They understand and agree with the plan.  They will follow back up here in the clinic but are instructed to contact us in the interim should they have any new, returning, or worsening symptoms or concerns. Please see specific information pulled into the AVS if appropriate.     Dragon Dictation utilized for chart completion.    Follow Up   Return in about 3 weeks (around 12/31/2024) for Recheck.      Valentina Trotter PA-C

## 2024-12-23 ENCOUNTER — OFFICE VISIT (OUTPATIENT)
Dept: ORTHOPEDIC SURGERY | Facility: CLINIC | Age: 64
End: 2024-12-23
Payer: COMMERCIAL

## 2024-12-23 VITALS
SYSTOLIC BLOOD PRESSURE: 129 MMHG | HEIGHT: 69 IN | HEART RATE: 64 BPM | WEIGHT: 230 LBS | DIASTOLIC BLOOD PRESSURE: 82 MMHG | OXYGEN SATURATION: 94 % | BODY MASS INDEX: 34.07 KG/M2

## 2024-12-23 DIAGNOSIS — M17.11 OSTEOARTHRITIS OF RIGHT KNEE, UNSPECIFIED OSTEOARTHRITIS TYPE: ICD-10-CM

## 2024-12-23 DIAGNOSIS — M25.561 RIGHT KNEE PAIN, UNSPECIFIED CHRONICITY: Primary | ICD-10-CM

## 2024-12-23 RX ORDER — TRIAMCINOLONE ACETONIDE 40 MG/ML
40 INJECTION, SUSPENSION INTRA-ARTICULAR; INTRAMUSCULAR
Status: COMPLETED | OUTPATIENT
Start: 2024-12-23 | End: 2024-12-23

## 2024-12-23 RX ORDER — LIDOCAINE HYDROCHLORIDE 10 MG/ML
5 INJECTION, SOLUTION INFILTRATION; PERINEURAL
Status: COMPLETED | OUTPATIENT
Start: 2024-12-23 | End: 2024-12-23

## 2024-12-23 RX ADMIN — LIDOCAINE HYDROCHLORIDE 5 ML: 10 INJECTION, SOLUTION INFILTRATION; PERINEURAL at 16:04

## 2024-12-23 RX ADMIN — TRIAMCINOLONE ACETONIDE 40 MG: 40 INJECTION, SUSPENSION INTRA-ARTICULAR; INTRAMUSCULAR at 16:04

## 2024-12-23 NOTE — PROGRESS NOTES
"Chief Complaint  Follow-up of the Right Knee     Subjective      Idris Park presents to [facility] for a follow up for his right knee. She has been treating his right knee osteoarthritis conservatively. He was last seen in the office on 08/28/24 where he had a right knee steroid injection. He reports this injection gave him great relief but has recently worn off. He is requesting a repeat injection today in the office. He denies any new injury or falls since his last visit.     Allergies   Allergen Reactions    Cephalexin Rash        Social History     Socioeconomic History    Marital status:    Tobacco Use    Smoking status: Never     Passive exposure: Past    Smokeless tobacco: Never   Vaping Use    Vaping status: Never Used   Substance and Sexual Activity    Alcohol use: Not Currently     Comment: socially-BEER MONTHLY    Drug use: Never    Sexual activity: Yes     Partners: Female     Birth control/protection: None        I reviewed the patient's chief complaint, history of present illness, review of systems, past medical history, surgical history, family history, social history, medications, and allergy list.     Review of Systems     Constitutional: Denies fevers, chills, weight loss  Cardiovascular: Denies chest pain, shortness of breath  Skin: Denies rashes, acute skin changes  Neurologic: Denies headache, loss of consciousness  MSK: Right knee pain       Vital Signs:   /82   Pulse 64   Ht 175.3 cm (69\")   Wt 104 kg (230 lb)   SpO2 94%   BMI 33.97 kg/m²          Physical Exam  General: Alert. No acute distress    Ortho Exam      Right knee: Skin is warm, dry, and intact. Mild edema. Tenderness to palpation of medial and lateral joint lines. Crepitus with ROM. Full knee extension and flexion to 12 degrees. Full plantarflexion and dorsiflexion of the ankle. Sensation intact to light touch. Distal neurovascular intact. Patient is fully ambulatory with mildly " antalgic gait.     Large Joint Arthrocentesis: R knee  Date/Time: 12/23/2024 4:04 PM  Consent given by: patient  Site marked: site marked  Timeout: Immediately prior to procedure a time out was called to verify the correct patient, procedure, equipment, support staff and site/side marked as required   Supporting Documentation  Indications: pain   Procedure Details  Location: knee - R knee  Preparation: Patient was prepped and draped in the usual sterile fashion  Needle gauge: 21 G.  Medications administered: 5 mL lidocaine 1 %; 40 mg triamcinolone acetonide 40 MG/ML  Patient tolerance: patient tolerated the procedure well with no immediate complications    This injection documentation was Scribed for Jb Campos MD by Sarah Vega.  12/23/24   16:04 EST        Imaging Results (Most Recent)       None             Result Review :       No results found.           Assessment and Plan     Diagnoses and all orders for this visit:    1. Right knee pain, unspecified chronicity (Primary)    2. Osteoarthritis of right knee, unspecified osteoarthritis type        The patient presents here today for a follow up for his right knee osteoarthritis.     Discussed the risks and benefits of a right knee steroid injection today in the office. Patient expressed understanding and wishes to proceed. He tolerated the injection well and without any complications.     Home exercises given today and will continue current medications for pain control.     Call or return if worsening symptoms.    Follow Up     3 months       Patient was given instructions and counseling regarding his condition or for health maintenance advice. Please see specific information pulled into the AVS if appropriate.     Scribed for Jb Campos MD by Sowmya Park.  12/23/24   15:45 EST    I have personally performed the services described in this document as scribed by the above individual and it is both accurate and complete. Jb Campos MD  12/23/24

## 2024-12-27 ENCOUNTER — OFFICE VISIT (OUTPATIENT)
Dept: WOUND CARE | Facility: HOSPITAL | Age: 64
End: 2024-12-27
Payer: COMMERCIAL

## 2024-12-27 VITALS
DIASTOLIC BLOOD PRESSURE: 90 MMHG | RESPIRATION RATE: 18 BRPM | TEMPERATURE: 97.7 F | HEART RATE: 66 BPM | SYSTOLIC BLOOD PRESSURE: 148 MMHG

## 2024-12-27 DIAGNOSIS — E11.621 DIABETIC ULCER OF TOE OF RIGHT FOOT ASSOCIATED WITH TYPE 2 DIABETES MELLITUS, WITH NECROSIS OF MUSCLE: Primary | ICD-10-CM

## 2024-12-27 DIAGNOSIS — L97.513 DIABETIC ULCER OF TOE OF RIGHT FOOT ASSOCIATED WITH TYPE 2 DIABETES MELLITUS, WITH NECROSIS OF MUSCLE: Primary | ICD-10-CM

## 2024-12-27 PROCEDURE — G0463 HOSPITAL OUTPT CLINIC VISIT: HCPCS | Performed by: PHYSICIAN ASSISTANT

## 2024-12-27 NOTE — PROGRESS NOTES
Chief Complaint  Wound Check (Follow up visit for DFU to right great toe.  Does not check BS routinely.  Patient is using collagen ag to wound.)    Subjective      History of Present Illness    Idris Park  is a 64 y.o. male here again today for a wound on his right great toe. The wound appears improved today. His toe wound developed several months ago without known injury. He finished Bactrim from his PCP and 6 weeks of doxycycline from our clinic for suspected osteomyelitis. He previously saw [facility].  They had suggested surgery but patient did not opt to have surgery.  He is on his feet 8 to 10 hours daily and works as a .  He is a diabetic and is on Ozempic.  He does not monitor his blood glucose levels regularly.  His most recent hemoglobin A1c was 8.1.  He is currently applying Cierra Ag to wound daily.  We cut out a hole in his shoe insert at previous appointment in order to offload pressure onto the wound.      MRI of right foot completed on 11/7/2024 revealed 1. Susceptibility artifact limiting the exam. There does appear to be some focal edema at the tip of the first distal phalanx with adjacent soft tissue skin ulceration and edema for which osteomyelitis cannot be excluded.     Labs on 11/11/24 revealed an elevated CRP.  Due to this fact, patient was placed on 2 additional weeks of Augmentin to treat for assumed osteomyelitis.  He has now finished this.    Allergies:  Cephalexin      Current Outpatient Medications:     cyclobenzaprine (FLEXERIL) 10 MG tablet, Take 1 tablet by mouth 3 (Three) Times a Day As Needed for Muscle Spasms., Disp: 30 tablet, Rfl: 1    DULoxetine (CYMBALTA) 30 MG capsule, TAKE 1 CAPSULE BY MOUTH DAILY, Disp: 90 capsule, Rfl: 1    etodolac (LODINE) 300 MG capsule, TAKE 1 CAPSULE BY MOUTH TWICE A DAY WITH A MEAL, Disp: 180 capsule, Rfl: 1    gabapentin (NEURONTIN) 300 MG capsule, TAKE 1 CAPSULE BY MOUTH 3 TIMES A DAY, Disp: 270 capsule, Rfl: 0    rosuvastatin  (CRESTOR) 5 MG tablet, Take 1 tablet by mouth Daily., Disp: 90 tablet, Rfl: 3    Semaglutide,0.25 or 0.5MG/DOS, (Ozempic, 0.25 or 0.5 MG/DOSE,) 2 MG/3ML solution pen-injector, Inject 0.25 mg under the skin into the appropriate area as directed 1 (One) Time Per Week., Disp: 3 mL, Rfl: 1    Past Medical History:   Diagnosis Date    Acromioclavicular separation 2004    Arthritis     COVID-19     Diabetes     Hyperlipidemia     Knee swelling 2000    Limb pain     Limb swelling      Past Surgical History:   Procedure Laterality Date    DENTAL PROCEDURE      EXTERNAL EAR SURGERY Bilateral     HERNIA REPAIR  1967    also 2011    JOINT REPLACEMENT Left     Artificial Joints/Limbs    KNEE ARTHROPLASTY Right     OTHER SURGICAL HISTORY      JOINT SURGERY     Social History     Socioeconomic History    Marital status:    Tobacco Use    Smoking status: Never     Passive exposure: Past    Smokeless tobacco: Never   Vaping Use    Vaping status: Never Used   Substance and Sexual Activity    Alcohol use: Not Currently     Comment: socially-BEER MONTHLY    Drug use: Never    Sexual activity: Yes     Partners: Female     Birth control/protection: None     Objective     Vitals:    12/27/24 1538   BP: 148/90   BP Location: Right arm   Patient Position: Sitting   Cuff Size: Adult   Pulse: 66   Resp: 18  Comment: 96% room air   Temp: 97.7 °F (36.5 °C)   TempSrc: Temporal   PainSc:   4   PainLoc: Toe     Review of Systems     ROS:  Per HPI.     I have reviewed the HPI and ROS as documented by MA/RN. Valentina Trotter PA-C    Physical Exam     NAD  AAOx3, pleasant, cooperative    Right great toe: Patient has an open wound to the distal aspect of the patient's right great toe.  The wound is improved today and smaller.  The base appears healthy. There is callous build up around wound today. This was peeled away. Wound base appears stalled today.  Silver nitrate was applied.      See photos for details:    Right great  toe:      Result Review :  The following data was reviewed by: Valentina Trotter PA-C on 12/27/2024:    Prior notes and images.    Reviewed today:     Xray completed on 7/23/24 showed no osteomyelitis    Labwork completed on 7/27/24 showed normal Prealbumin/sed rate/CRP.          Assessment and Plan   Diagnoses and all orders for this visit:    1. Diabetic ulcer of toe of right foot associated with type 2 diabetes mellitus, with necrosis of muscle (Primary)      Wound is improved again today but still slow healing.  We are treating patient for assumed osteomyelitis.  He has finished Augmentin for osteomyelitis treatment.  Patient should continue to apply Cierra Ag to the wound daily and cover with gauze wrap. We did cut out a hole in his shoe insert previously to offload pressure onto the wound.  We discussed the importance of offloading the wound is much as possible as well as staying off the foot is much as possible. We also discussed the importance of monitoring his blood glucose levels very closely as well is consuming a healthy diet with plenty of protein to allow for wound healing.      Patient was given instructions and counseling regarding their condition or for health maintenance advice, as well as the wound care plan and recommendations. They understand and agree with the plan.  They will follow back up here in the clinic but are instructed to contact us in the interim should they have any new, returning, or worsening symptoms or concerns. Please see specific information pulled into the AVS if appropriate.     Dragon Dictation utilized for chart completion.    Follow Up   Return in about 4 weeks (around 1/24/2025) for Recheck.      Valentina Trotter PA-C

## 2025-01-02 DIAGNOSIS — L97.513 DIABETIC ULCER OF TOE OF RIGHT FOOT ASSOCIATED WITH TYPE 2 DIABETES MELLITUS, WITH NECROSIS OF MUSCLE: Primary | ICD-10-CM

## 2025-01-02 DIAGNOSIS — E11.621 DIABETIC ULCER OF TOE OF RIGHT FOOT ASSOCIATED WITH TYPE 2 DIABETES MELLITUS, WITH NECROSIS OF MUSCLE: Primary | ICD-10-CM

## 2025-01-09 ENCOUNTER — OFFICE VISIT (OUTPATIENT)
Dept: PODIATRY | Facility: CLINIC | Age: 65
End: 2025-01-09
Payer: COMMERCIAL

## 2025-01-09 VITALS
SYSTOLIC BLOOD PRESSURE: 127 MMHG | OXYGEN SATURATION: 94 % | DIASTOLIC BLOOD PRESSURE: 77 MMHG | BODY MASS INDEX: 35.1 KG/M2 | HEIGHT: 69 IN | TEMPERATURE: 97.3 F | HEART RATE: 65 BPM | WEIGHT: 237 LBS

## 2025-01-09 DIAGNOSIS — E11.42 TYPE 2 DIABETES MELLITUS WITH POLYNEUROPATHY: Primary | ICD-10-CM

## 2025-01-09 DIAGNOSIS — G62.9 NEUROPATHY: ICD-10-CM

## 2025-01-09 NOTE — LETTER
January 9, 2025     LORENA Olivares  [address]  [address]  [place] [address]    Patient: Idris Park   YOB: 1960   Date of Visit: 1/9/2025       Dear LORENA Olivares:    Thank you for referring Idris Park to me for evaluation. Below are the relevant portions of my assessment and plan of care.    Encounter Diagnosis and Orders:  Diagnoses and all orders for this visit:    1. Type 2 diabetes mellitus with polyneuropathy (Primary)    2. Neuropathy        If you have questions, please do not hesitate to call me. I look forward to following Idris along with you.         Sincerely,        Ervin Whitehead DPM        CC: Valentina Trotter PA-C

## 2025-01-09 NOTE — PROGRESS NOTES
[facility] - PODIATRY    Today's Date: 01/09/25    Patient Name: Idris Park  MRN: [id number]  CSN: [id number]  PCP: Dae Yan APRN, Last PCP Visit:  12/2/2024  Referring Provider: Valentina Trotter, *    SUBJECTIVE     Chief Complaint   Patient presents with    Right Foot - Follow-up, Foot Ulcer     Doing better       Left Foot - Pain     Fracture on inner side of great toe   Had been seeing [facility]   PCP ordered X-ray 8/23/24 and would like to discuss      HPI: Idris Park, a 64 y.o.male, presents to clinic for a diabetic foot evaluation.    New, Established, New Problem: New    Onset: Insidious    Nature: Weekly injections    Neuropathy    Patient states they are unsure of the most recent blood glucose reading.    Patient states she is being followed at wound care center for ulceration distal aspect of right great toe.    Patient relates stiffness in left first MPJ which has been treated last summer by [facility].  Patient is referred for follow-up for this.    Patient denies any fevers, chills, nausea, vomiting, shortness of breath, nor any other constitutional signs nor symptoms.  Past Medical History:   Diagnosis Date    Acromioclavicular separation 2004    Arthritis     COVID-19     Diabetes     Hyperlipidemia     Knee swelling 2000    Limb pain     Limb swelling      Past Surgical History:   Procedure Laterality Date    DENTAL PROCEDURE      EXTERNAL EAR SURGERY Bilateral     HERNIA REPAIR  1967    also 2011    JOINT REPLACEMENT Left     Artificial Joints/Limbs    KNEE ARTHROPLASTY Right     OTHER SURGICAL HISTORY      JOINT SURGERY     Family History   Problem Relation Age of Onset    Cancer Mother     Breast cancer Mother 50     Social History     Socioeconomic History    Marital status:    Tobacco Use    Smoking status: Never     Passive exposure: Past    Smokeless tobacco: Never   Vaping Use    Vaping status: Never Used   Substance and Sexual  Activity    Alcohol use: Not Currently     Comment: socially-BEER MONTHLY    Drug use: Never    Sexual activity: Yes     Partners: Female     Birth control/protection: None     Allergies   Allergen Reactions    Cephalexin Rash     Current Outpatient Medications   Medication Sig Dispense Refill    cyclobenzaprine (FLEXERIL) 10 MG tablet Take 1 tablet by mouth 3 (Three) Times a Day As Needed for Muscle Spasms. 30 tablet 1    DULoxetine (CYMBALTA) 30 MG capsule TAKE 1 CAPSULE BY MOUTH DAILY 90 capsule 1    etodolac (LODINE) 300 MG capsule TAKE 1 CAPSULE BY MOUTH TWICE A DAY WITH A MEAL 180 capsule 1    gabapentin (NEURONTIN) 300 MG capsule TAKE 1 CAPSULE BY MOUTH 3 TIMES A  capsule 0    rosuvastatin (CRESTOR) 5 MG tablet Take 1 tablet by mouth Daily. 90 tablet 3    Semaglutide,0.25 or 0.5MG/DOS, (Ozempic, 0.25 or 0.5 MG/DOSE,) 2 MG/3ML solution pen-injector Inject 0.25 mg under the skin into the appropriate area as directed 1 (One) Time Per Week. 3 mL 1     No current facility-administered medications for this visit.     Review of Systems   Constitutional: Negative.    Skin:         Ulceration on Right hallux.  Stiffness in Left first MPJ.   Neurological:  Positive for numbness.   All other systems reviewed and are negative.      OBJECTIVE     Vitals:    01/09/25 0800   BP: 127/77   Pulse: 65   Temp: 97.3 °F (36.3 °C)   SpO2: 94%       Body mass index is 34.98 kg/m².    Lab Results   Component Value Date    HGBA1C 6.70 (H) 11/29/2024       Lab Results   Component Value Date    GLUCOSE 132 (H) 11/29/2024    CALCIUM 9.5 11/29/2024     11/29/2024    K 4.8 11/29/2024    CO2 28.0 11/29/2024     11/29/2024    BUN 8 11/29/2024    CREATININE 0.94 11/29/2024    EGFRIFNONA 68 09/23/2021    BCR 8.5 11/29/2024    ANIONGAP 9.0 11/29/2024       Patient seen in no apparent distress.      PHYSICAL EXAM:     Foot/Ankle Exam    GENERAL  Diabetic foot exam performed    Appearance:  appears stated age  Orientation:   AAOx3  Affect:  appropriate  Gait:  unimpaired  Assistance:  independent  Right shoe gear: casual shoe  Left shoe gear: casual shoe    VASCULAR     Right Foot Vascularity   Dorsalis pedis:  2+  Posterior tibial:  2+  Skin temperature:  warm  Edema grading:  None  CFT:  < 3 seconds  Pedal hair growth:  Present  Varicosities:  moderate varicosities     Left Foot Vascularity   Dorsalis pedis:  2+  Posterior tibial:  2+  Skin temperature:  warm  Edema grading:  None  CFT:  < 3 seconds  Pedal hair growth:  Present  Varicosities:  moderate varicosities     NEUROLOGIC     Right Foot Neurologic   Light touch sensation: diminished  Vibratory sensation: diminished  Hot/Cold sensation: diminished  Protective Sensation using Wood[place]ull-Satya Monofilament:   Sites intact: 2  Sites tested: 10     Left Foot Neurologic   Light touch sensation: diminished  Vibratory sensation: diminished  Hot/Cold sensation:  diminished  Protective Sensation using [place]ull-Satya Monofilament:   Sites intact: 2  Sites tested: 10    MUSCULOSKELETAL     Right Foot Musculoskeletal   Hammertoe:  First toe     Left Foot Musculoskeletal   Hammertoe:  First toe  Hallux limitus: Yes      MUSCLE STRENGTH     Right Foot Muscle Strength   Foot dorsiflexion:  4  Foot plantar flexion:  4  Foot inversion:  4  Foot eversion:  4     Left Foot Muscle Strength   Foot dorsiflexion:  4  Foot plantar flexion:  4  Foot inversion:  4  Foot eversion:  4    RANGE OF MOTION     Right Foot Range of Motion   Foot and ankle ROM within normal limits       Left Foot Range of Motion   Foot and ankle ROM within normal limits      DERMATOLOGIC      Right Foot Dermatologic   Skin  Positive for ulcer.      Left Foot Dermatologic   Skin  Left foot skin is intact.      Right foot additional comments: Patient does aspect of right hallux.  No drainage.  No signs of edema, erythema, lymphangitis, nor signs of infection.    Diabetic Foot Exam Performed and Monofilament Test Performed    XR  FOOT 3+ VW RIGHT     Date of Exam: 7/29/2024 8:41 AM EDT     Indication: Right great toe wound, evaluate for osteomyelitis     Comparison: None available.     Findings:  Only the right toes were included in the field-of-view. There is soft tissue swelling involving the right great toe with a shallow ulcer. There is no cortical destruction or periostitis to indicate osteomyelitis. There is narrowing and spurring of the   inner-phalangeal joints of the digits and the first through third MTP joints consistent with osteoarthritis. There is deformity of the third metatarsal head which may be due to osteonecrosis.     IMPRESSION:  Impression:     1. Soft tissue swelling involving the right great toe with a shallow ulcer. There are no radiographic changes of osteomyelitis.  2. Degenerative changes.  3. Abnormal appearance of the third metatarsal head which may be due to osteonecrosis.        Electronically Signed: Raghavendra Zelaya MD    7/29/2024 2:39 PM EDT    Workstation ID: [id number]    Narrative & Impression   XR ANKLE 3+ VW RIGHT, XR FOOT 3+ VW LEFT     Date of Exam: 8/23/2024 3:16 PM EDT     Indication: right ankle pain     Comparison: None available.     Findings:  Ankle: Soft tissue swelling. No acute fracture. No acute bony abnormality     Foot: Probable nondisplaced fracture of the medial aspect of the head of the first metatarsal that extends deep to the articular surface cortex.     IMPRESSION:  Impression:     1. No acute abnormality of the ankle.  2. Probable nondisplaced fracture of the head of the first metatarsal        Electronically Signed: Volodymyr Pablo    8/23/2024 3:58 PM EDT    Workstation ID: [id number]       Narrative & Impression   XR ANKLE 3+ VW RIGHT, XR FOOT 3+ VW LEFT     Date of Exam: 8/23/2024 3:16 PM EDT     Indication: right ankle pain     Comparison: None available.     Findings:  Ankle: Soft tissue swelling. No acute fracture. No acute bony abnormality     Foot: Probable nondisplaced fracture of  the medial aspect of the head of the first metatarsal that extends deep to the articular surface cortex.     IMPRESSION:  Impression:     1. No acute abnormality of the ankle.  2. Probable nondisplaced fracture of the head of the first metatarsal        Electronically Signed: Volodymyr Pablo    8/23/2024 3:58 PM EDT    Workstation ID: [id number]       Narrative & Impression      MRI FOOT RIGHT W WO CONTRAST     Date of Exam: 11/7/2024 9:00 AM EST     Indication: right great toe wound and right foot pain.     Comparison: Right foot 7/29/2024     Technique:  Routine multiplanar/multisequence sequence images of the right foot were obtained before and after the uneventful administration of Multihance.          Findings:  Evaluation of the right foot demonstrates susceptibility artifact adjacent to the first distal phalanx noted on the majority of sequences limiting the exam. On the initial sagittal T1 and fluid sensitive T2 sequence there does appear to be low T1 high T2   signal at the tip of the distal phalanx compatible with confluent edema. There is some dorsal skin ulceration and soft tissue edema noted. Osteomyelitis cannot be excluded.     Additional findings include advanced degenerative changes at the fourth MTP joint with bony remodeling and joint space narrowing. There is degenerative arthrosis at the first MTP joint with a subchondral cyst along the plantar margin of the metatarsal   head and osteophyte formation. Subchondral cystic changes present at the base of the fourth metatarsal.     Postcontrast sequences are limited in evaluation. There is also limited evaluation of the fifth MTP joint by susceptibility artifact.     IMPRESSION:  Impression:     1. Susceptibility artifact limiting the exam. There does appear to be some focal edema at the tip of the first distal phalanx with adjacent soft tissue skin ulceration and edema for which osteomyelitis cannot be excluded.           Electronically Signed: [place]  MD Aldo    11/7/2024 11:07 AM EST    Workstation ID: [id number]       ASSESSMENT/PLAN     Diagnoses and all orders for this visit:    1. Type 2 diabetes mellitus with polyneuropathy (Primary)    2. Neuropathy    Comprehensive lower extremity examination and evaluation was performed.    Discussed findings and treatment plan including risks, benefits, and treatment options with patient in detail. Patient agreed with treatment plan.    Medications and allergies reviewed.  Reviewed available blood glucose and HgB A1C lab values along with other pertinent labs.  These were discussed with the patient as to their importance of diabetic maintenance.    Continue to follow-up with wound care center as already scheduled on 24 January 2025.    Diabetic foot exam performed and documented this date, compliant with CQM required standards. Detail of findings as noted in physical exam.  Lower extremity Neurologic exam for diabetic patient performed and documented this date, compliant with PQRS required standards. Detail of findings as noted in physical exam.  Advised patient importance of good routine lower extremity hygiene. Advised patient importance of evaluating for intact skin and pain free nail borders.  Advised patient to use mirror to evaluate plantar/ soles of feet for better visualization. Advised patient monitor and phone office to be seen if any cracking to skin, open lesions, painful nail borders or if nails become elongated prior to next visit. Advised patient importance of daily cleansing of lower extremities, followed by good skin cream to maintain normal hydration of skin. Also advised patient importance of close daily monitoring of blood sugar. Advised to regulate diet and medications to maintain control of blood sugar in optimal range. Contact primary care provider if difficulties maintaining blood sugar levels.  Advised Patient of presence of Diabetes Mellitus condition.  Advised Patient risk of progression and  worsening or improvement, then return of condition.  Will monitor condition for any change in future. Treat with most appropriate treatment pending status of condition.  Counseled and advised patient extensively on nature and ramifications of diabetes. Standard instructions given to patient for good diabetic foot care and maintenance. Advised importance of careful monitoring to avoid break down and complications secondary to diabetes. Advised patient importance of strict maintenance of blood sugar control. Advised patient of possible ominous results from neglect of condition, i.e.: amputation/ loss of digits, feet and legs, or even death.  Patient states understands counseling, will monitor closely, continue good hygiene and routine diabetic foot care. Patient will contact office is questions or problems.      An After Visit Summary was printed and given to the patient at discharge, including (if requested) any available informative/educational handouts regarding diagnosis, treatment, or medications. All questions were answered to patient/family satisfaction. Should symptoms fail to improve or worsen they agree to call or return to clinic or to go to the Emergency Department. Discussed the importance of following up with any needed screening tests/labs/specialist appointments and any requested follow-up recommended by me today. Importance of maintaining follow-up discussed and patient accepts that missed appointments can delay diagnosis and potentially lead to worsening of conditions.    Return in about 1 year (around 1/9/2026) for DFE., or sooner if acute issues arise.    This document has been electronically signed by Ervin Whitehead DPM on January 9, 2025 08:41 EST

## 2025-01-15 DIAGNOSIS — F33.1 MODERATE EPISODE OF RECURRENT MAJOR DEPRESSIVE DISORDER: ICD-10-CM

## 2025-01-15 RX ORDER — DULOXETIN HYDROCHLORIDE 30 MG/1
30 CAPSULE, DELAYED RELEASE ORAL DAILY
Qty: 90 CAPSULE | Refills: 1 | Status: SHIPPED | OUTPATIENT
Start: 2025-01-15

## 2025-01-24 ENCOUNTER — OFFICE VISIT (OUTPATIENT)
Dept: WOUND CARE | Facility: HOSPITAL | Age: 65
End: 2025-01-24
Payer: COMMERCIAL

## 2025-01-24 VITALS
RESPIRATION RATE: 16 BRPM | DIASTOLIC BLOOD PRESSURE: 74 MMHG | HEART RATE: 76 BPM | TEMPERATURE: 98.7 F | SYSTOLIC BLOOD PRESSURE: 140 MMHG

## 2025-01-24 DIAGNOSIS — L08.9 WOUND INFECTION: ICD-10-CM

## 2025-01-24 DIAGNOSIS — T14.8XXA WOUND INFECTION: ICD-10-CM

## 2025-01-24 DIAGNOSIS — E11.621 DIABETIC ULCER OF TOE OF RIGHT FOOT ASSOCIATED WITH TYPE 2 DIABETES MELLITUS, WITH NECROSIS OF MUSCLE: Primary | ICD-10-CM

## 2025-01-24 DIAGNOSIS — L97.513 DIABETIC ULCER OF TOE OF RIGHT FOOT ASSOCIATED WITH TYPE 2 DIABETES MELLITUS, WITH NECROSIS OF MUSCLE: Primary | ICD-10-CM

## 2025-01-24 PROCEDURE — G0463 HOSPITAL OUTPT CLINIC VISIT: HCPCS | Performed by: PHYSICIAN ASSISTANT

## 2025-01-24 RX ORDER — DOXYCYCLINE 100 MG/1
100 CAPSULE ORAL 2 TIMES DAILY
Qty: 28 CAPSULE | Refills: 0 | Status: SHIPPED | OUTPATIENT
Start: 2025-01-24 | End: 2025-02-07

## 2025-01-24 NOTE — PROGRESS NOTES
Chief Complaint  Wound Check (Pt here for wound check on right great toe. No complaints of pain at the moment.  He does have pain sometimes while walking. Pt does not check blood sugar.)    Subjective      History of Present Illness    Idris Park  is a 64 y.o. male here today for a wound on his right great toe. The wound has not changed much since last appointment. His toe wound developed several months ago without known injury. He finished Bactrim from his PCP and 6 weeks of augmentin from our clinic for suspected osteomyelitis. He previously saw [facility].  He is on his feet 8 to 10 hours daily and works as a .  He is a diabetic and is on Ozempic.  He does not monitor his blood glucose levels regularly.  His most recent hemoglobin A1c was 8.1.  He is currently applying Cierra Ag to wound daily.  We cut out a hole in his shoe insert at previous appointment in order to offload pressure onto the wound.  He does still have pain sometimes in toe while walking but not other changes.     MRI of right foot completed on 11/7/2024 revealed 1. Susceptibility artifact limiting the exam. There does appear to be some focal edema at the tip of the first distal phalanx with adjacent soft tissue skin ulceration and edema for which osteomyelitis cannot be excluded.     Labs on 11/11/24 revealed an elevated CRP.  Due to this fact, patient was placed on 2 additional weeks of Augmentin to treat for assumed osteomyelitis.  He has now finished this.    Allergies:  Cephalexin      Current Outpatient Medications:     cyclobenzaprine (FLEXERIL) 10 MG tablet, Take 1 tablet by mouth 3 (Three) Times a Day As Needed for Muscle Spasms., Disp: 30 tablet, Rfl: 1    doxycycline (VIBRAMYCIN) 100 MG capsule, Take 1 capsule by mouth 2 (Two) Times a Day for 14 days. Do not take at the same time as any vitamin or mineral supplements., Disp: 28 capsule, Rfl: 0    DULoxetine (CYMBALTA) 30 MG capsule, TAKE 1 CAPSULE BY MOUTH DAILY, Disp:  90 capsule, Rfl: 1    etodolac (LODINE) 300 MG capsule, TAKE 1 CAPSULE BY MOUTH TWICE A DAY WITH A MEAL, Disp: 180 capsule, Rfl: 1    gabapentin (NEURONTIN) 300 MG capsule, TAKE 1 CAPSULE BY MOUTH 3 TIMES A DAY, Disp: 270 capsule, Rfl: 0    rosuvastatin (CRESTOR) 5 MG tablet, Take 1 tablet by mouth Daily., Disp: 90 tablet, Rfl: 3    Semaglutide,0.25 or 0.5MG/DOS, (Ozempic, 0.25 or 0.5 MG/DOSE,) 2 MG/3ML solution pen-injector, Inject 0.25 mg under the skin into the appropriate area as directed 1 (One) Time Per Week., Disp: 3 mL, Rfl: 1    Past Medical History:   Diagnosis Date    Acromioclavicular separation 2004    Arthritis     COVID-19     Diabetes     Hyperlipidemia     Knee swelling 2000    Limb pain     Limb swelling      Past Surgical History:   Procedure Laterality Date    DENTAL PROCEDURE      EXTERNAL EAR SURGERY Bilateral     HERNIA REPAIR  1967    also 2011    JOINT REPLACEMENT Left     Artificial Joints/Limbs    KNEE ARTHROPLASTY Right     OTHER SURGICAL HISTORY      JOINT SURGERY     Social History     Socioeconomic History    Marital status:    Tobacco Use    Smoking status: Never     Passive exposure: Past    Smokeless tobacco: Never   Vaping Use    Vaping status: Never Used   Substance and Sexual Activity    Alcohol use: Not Currently     Comment: socially-BEER MONTHLY    Drug use: Never    Sexual activity: Yes     Partners: Female     Birth control/protection: None     Objective     Vitals:    01/24/25 0856   BP: 140/74   BP Location: Right arm   Patient Position: Sitting   Cuff Size: Adult   Pulse: 76   Resp: 16  Comment: 98% RA   Temp: 98.7 °F (37.1 °C)   TempSrc: Temporal   PainSc: 0-No pain     Review of Systems     ROS:  Per HPI.     I have reviewed the HPI and ROS as documented by MA/RN. Valentina Trotter PA-C    Physical Exam     NAD  AAOx3, pleasant, cooperative    Right great toe: Patient has an open wound to the distal aspect of the patient's right great toe.  The wound has not  changed much since last appointment and seems stalled.  There is overlying callous but wound remains beneath. The base appears healthy. There is callous build up around wound today. This was peeled away.     See photos for details:    Right great toe:          Result Review :  The following data was reviewed by: Valentina Trotter PA-C on 01/24/2025:    Prior notes and images.    Reviewed today:     Xray completed on 7/23/24 showed no osteomyelitis    Labwork completed on 7/27/24 showed normal Prealbumin/sed rate/CRP.          Assessment and Plan   Diagnoses and all orders for this visit:    1. Diabetic ulcer of toe of right foot associated with type 2 diabetes mellitus, with necrosis of muscle (Primary)    2. Wound infection    Other orders  -     doxycycline (VIBRAMYCIN) 100 MG capsule; Take 1 capsule by mouth 2 (Two) Times a Day for 14 days. Do not take at the same time as any vitamin or mineral supplements.  Dispense: 28 capsule; Refill: 0      Wound is improved again today but still slow healing.  We are treating patient for assumed osteomyelitis.  He has finished Augmentin for osteomyelitis treatment. We will send in an additional 2 weeks of doxycycline today due to wound being stalled.  Patient should continue to apply Cierra Ag to the wound daily and cover with gauze wrap. We did cut out a hole in his shoe insert previously to offload pressure onto the wound.  We discussed the importance of offloading the wound is much as possible as well as staying off the foot is much as possible. We also discussed the importance of monitoring his blood glucose levels very closely as well is consuming a healthy diet with plenty of protein to allow for wound healing.  He will follow back up with us in 3 weeks.     Patient was given instructions and counseling regarding their condition or for health maintenance advice, as well as the wound care plan and recommendations. They understand and agree with the plan.  They will  follow back up here in the clinic but are instructed to contact us in the interim should they have any new, returning, or worsening symptoms or concerns. Please see specific information pulled into the AVS if appropriate.     Dragon Dictation utilized for chart completion.    Follow Up   Return in about 3 weeks (around 2/14/2025).      Valentina Trotter PA-C

## 2025-02-14 ENCOUNTER — OFFICE VISIT (OUTPATIENT)
Dept: WOUND CARE | Facility: HOSPITAL | Age: 65
End: 2025-02-14
Payer: COMMERCIAL

## 2025-02-14 VITALS
HEART RATE: 66 BPM | DIASTOLIC BLOOD PRESSURE: 78 MMHG | TEMPERATURE: 98.5 F | RESPIRATION RATE: 18 BRPM | SYSTOLIC BLOOD PRESSURE: 142 MMHG

## 2025-02-14 DIAGNOSIS — T14.8XXA BLISTER: ICD-10-CM

## 2025-02-14 DIAGNOSIS — L03.115 CELLULITIS OF RIGHT LOWER EXTREMITY: ICD-10-CM

## 2025-02-14 DIAGNOSIS — L97.513 DIABETIC ULCER OF TOE OF RIGHT FOOT ASSOCIATED WITH TYPE 2 DIABETES MELLITUS, WITH NECROSIS OF MUSCLE: Primary | ICD-10-CM

## 2025-02-14 DIAGNOSIS — T14.8XXA WOUND INFECTION: ICD-10-CM

## 2025-02-14 DIAGNOSIS — E11.621 DIABETIC ULCER OF TOE OF RIGHT FOOT ASSOCIATED WITH TYPE 2 DIABETES MELLITUS, WITH NECROSIS OF MUSCLE: Primary | ICD-10-CM

## 2025-02-14 DIAGNOSIS — L08.9 WOUND INFECTION: ICD-10-CM

## 2025-02-14 PROCEDURE — G0463 HOSPITAL OUTPT CLINIC VISIT: HCPCS | Performed by: PHYSICIAN ASSISTANT

## 2025-02-14 NOTE — PROGRESS NOTES
Chief Complaint  Wound Check (Patient is here for a wound check on his right great toe.  Patient has no complaints of pain at this time.  He uses collagen ag and 4x4 gauze that he changes daily.  He is diabetic but does not check his blood sugar. Last Hgb A1C was 6.7 in 11/2024. He is currently taking doxycycline.)    Subjective      History of Present Illness    Idris Park  is a 64 y.o. male here today for a wound on his right great toe. The wound has not changed much since last appointment. His toe wound developed several months ago without known injury. He finished Bactrim from his PCP and 6 weeks of augmentin from our clinic for suspected osteomyelitis. He previously [facility].  He is on his feet 8 to 10 hours daily and works as a .  He is a diabetic and is on Ozempic.  He does not monitor his blood glucose levels regularly.  His most recent hemoglobin A1c was 8.1.  He is currently applying Cierra Ag to wound daily.  We cut out a hole in his shoe insert at previous appointment in order to offload pressure onto the wound.  He does still have pain sometimes in toe while walking but not other changes.     MRI of right foot completed on 11/7/2024 revealed 1. Susceptibility artifact limiting the exam. There does appear to be some focal edema at the tip of the first distal phalanx with adjacent soft tissue skin ulceration and edema for which osteomyelitis cannot be excluded.     Labs on 11/11/24 revealed an elevated CRP.  Due to this fact, patient was placed on 2 additional weeks of Augmentin to treat for assumed osteomyelitis.  He also was placed on 2 weeks of doxycycline at last appointment due to wound base appearing opaque and stalled. His pharmacy made a mistake and patient has only been taking the doxycycline for the past 3 days. The wound seems to have somewhat improved since last appointment. However, his right foot appears slightly red on the dorsal aspect and there is slight redness to  his shin. However, patient is not sure if this is related to wrapping. He has a small new blister to the dorsal aspect of his right foot.  He denies pain at this point    Allergies:  Cephalexin      Current Outpatient Medications:     cyclobenzaprine (FLEXERIL) 10 MG tablet, Take 1 tablet by mouth 3 (Three) Times a Day As Needed for Muscle Spasms., Disp: 30 tablet, Rfl: 1    DULoxetine (CYMBALTA) 30 MG capsule, TAKE 1 CAPSULE BY MOUTH DAILY, Disp: 90 capsule, Rfl: 1    etodolac (LODINE) 300 MG capsule, TAKE 1 CAPSULE BY MOUTH TWICE A DAY WITH A MEAL, Disp: 180 capsule, Rfl: 1    gabapentin (NEURONTIN) 300 MG capsule, TAKE 1 CAPSULE BY MOUTH 3 TIMES A DAY, Disp: 270 capsule, Rfl: 0    rosuvastatin (CRESTOR) 5 MG tablet, Take 1 tablet by mouth Daily., Disp: 90 tablet, Rfl: 3    Semaglutide,0.25 or 0.5MG/DOS, (Ozempic, 0.25 or 0.5 MG/DOSE,) 2 MG/3ML solution pen-injector, Inject 0.25 mg under the skin into the appropriate area as directed 1 (One) Time Per Week., Disp: 3 mL, Rfl: 1    Past Medical History:   Diagnosis Date    Acromioclavicular separation 2004    Arthritis     COVID-19     Diabetes     Hyperlipidemia     Knee swelling 2000    Limb pain     Limb swelling      Past Surgical History:   Procedure Laterality Date    DENTAL PROCEDURE      EXTERNAL EAR SURGERY Bilateral     HERNIA REPAIR  1967    also 2011    JOINT REPLACEMENT Left     Artificial Joints/Limbs    KNEE ARTHROPLASTY Right     OTHER SURGICAL HISTORY      JOINT SURGERY     Social History     Socioeconomic History    Marital status:    Tobacco Use    Smoking status: Never     Passive exposure: Past    Smokeless tobacco: Never   Vaping Use    Vaping status: Never Used   Substance and Sexual Activity    Alcohol use: Not Currently     Comment: socially-BEER MONTHLY    Drug use: Never    Sexual activity: Yes     Partners: Female     Birth control/protection: None     Objective     Vitals:    02/14/25 0855   BP: 142/78   BP Location: Right arm    Patient Position: Sitting   Cuff Size: Adult   Pulse: 66   Resp: 18  Comment: 96% RA   Temp: 98.5 °F (36.9 °C)   TempSrc: Temporal   PainSc: 0-No pain     Review of Systems     ROS:  Per HPI.     I have reviewed the HPI and ROS as documented by MA/RN. Valentina Trotter PA-C    Physical Exam     NAD  AAOx3, pleasant, cooperative    Right great toe: Patient has an open wound to the distal aspect of the patient's right great toe.  The wound has not changed much since last appointment.  It may be a bit smaller.  There is overlying callous but wound remains beneath. The base appears healthy. There is callous build up around wound today. This was peeled away.     The patient has a small clear blister to the dorsal aspect of his right foot.  The contents of blister was expressed today.  This blister may be due to bandaging.    See photos for details:    Right great toe:        Result Review :  The following data was reviewed by: Valentina Trotter PA-C on 02/14/2025:    Prior notes and images.         Assessment and Plan   Diagnoses and all orders for this visit:    1. Diabetic ulcer of toe of right foot associated with type 2 diabetes mellitus, with necrosis of muscle (Primary)    2. Wound infection    3. Cellulitis of right lower extremity    4. Blister        Wound is improved again today but still slow healing.  We are treating patient for assumed osteomyelitis.  He has finished Augmentin for osteomyelitis treatment. We sent in an additional 2 weeks of doxycycline today due to wound being stalled.  Patient should continue to apply Cierra Ag to the wound daily and cover with gauze wrap. He should apply betadine once daily to his new blister to the dorsal foot. We did cut out a hole in his shoe insert previously to offload pressure onto the wound.  We discussed the importance of offloading the wound is much as possible as well as staying off the foot is much as possible. We also discussed the importance of  monitoring his blood glucose levels very closely as well is consuming a healthy diet with plenty of protein to allow for wound healing.  He will follow back up with us in 2 weeks.  The patient has some redness to his right dorsal foot and his shin.  It is hard to know whether this is cellulitis but given the infected toe it could be.  He has only been on the doxycycline for 3 days.  He should finish this prescription.  If he has any worsening redness or swelling to his right foot or increased drainage he should go to the emergency department over the weekend.  He will call us with any other worsening symptoms otherwise.    I spent 38 minutes in both face-to-face and nonface-to-face time for patient care today including, but not limited to, review of old records, reviewing old images, history and physical exam, reviewing test results, counseling patient, entering orders, coordinating care, and documenting.     Patient was given instructions and counseling regarding their condition or for health maintenance advice, as well as the wound care plan and recommendations. They understand and agree with the plan.  They will follow back up here in the clinic but are instructed to contact us in the interim should they have any new, returning, or worsening symptoms or concerns. Please see specific information pulled into the AVS if appropriate.     Dragon Dictation utilized for chart completion.    Follow Up   Return in about 2 weeks (around 2/28/2025) for Recheck.      Valentina Trotter PA-C

## 2025-02-21 DIAGNOSIS — M54.50 CHRONIC LOW BACK PAIN, UNSPECIFIED BACK PAIN LATERALITY, UNSPECIFIED WHETHER SCIATICA PRESENT: ICD-10-CM

## 2025-02-21 DIAGNOSIS — G89.29 CHRONIC LOW BACK PAIN, UNSPECIFIED BACK PAIN LATERALITY, UNSPECIFIED WHETHER SCIATICA PRESENT: ICD-10-CM

## 2025-02-24 RX ORDER — GABAPENTIN 300 MG/1
300 CAPSULE ORAL 3 TIMES DAILY
Qty: 270 CAPSULE | Refills: 0 | Status: SHIPPED | OUTPATIENT
Start: 2025-02-24

## 2025-02-24 NOTE — TELEPHONE ENCOUNTER
Upcoming Appts  With Family Medicine ([facility])  06/02/2025 at 7:30 AM  Last Office Visit - This Dept  12/2/2024 Dae Yan APRN    Uds5/29/24

## 2025-02-26 ENCOUNTER — OFFICE VISIT (OUTPATIENT)
Dept: FAMILY MEDICINE CLINIC | Facility: CLINIC | Age: 65
End: 2025-02-26
Payer: COMMERCIAL

## 2025-02-26 VITALS
HEART RATE: 60 BPM | BODY MASS INDEX: 35.66 KG/M2 | HEIGHT: 69 IN | WEIGHT: 240.8 LBS | TEMPERATURE: 98.6 F | SYSTOLIC BLOOD PRESSURE: 120 MMHG | DIASTOLIC BLOOD PRESSURE: 78 MMHG | OXYGEN SATURATION: 98 % | RESPIRATION RATE: 18 BRPM

## 2025-02-26 DIAGNOSIS — R52 GENERALIZED BODY ACHES: Primary | ICD-10-CM

## 2025-02-26 DIAGNOSIS — H66.90 ACUTE OTITIS MEDIA, UNSPECIFIED OTITIS MEDIA TYPE: ICD-10-CM

## 2025-02-26 DIAGNOSIS — E11.65 TYPE 2 DIABETES MELLITUS WITH HYPERGLYCEMIA, WITHOUT LONG-TERM CURRENT USE OF INSULIN: ICD-10-CM

## 2025-02-26 LAB
EXPIRATION DATE: NORMAL
FLUAV AG UPPER RESP QL IA.RAPID: NOT DETECTED
FLUBV AG UPPER RESP QL IA.RAPID: NOT DETECTED
INTERNAL CONTROL: NORMAL
Lab: NORMAL
SARS-COV-2 AG UPPER RESP QL IA.RAPID: NOT DETECTED

## 2025-02-26 PROCEDURE — 99213 OFFICE O/P EST LOW 20 MIN: CPT | Performed by: NURSE PRACTITIONER

## 2025-02-26 PROCEDURE — 87428 SARSCOV & INF VIR A&B AG IA: CPT | Performed by: NURSE PRACTITIONER

## 2025-02-26 RX ORDER — AZITHROMYCIN 250 MG/1
TABLET, FILM COATED ORAL
Qty: 6 TABLET | Refills: 0 | Status: SHIPPED | OUTPATIENT
Start: 2025-02-26

## 2025-02-26 NOTE — PROGRESS NOTES
Chief Complaint  Cough and Generalized Body Aches    Subjective        Idris Park presents to [facility]  History of Present Illness  Started 2 days ago with congestion and cough.  Has had PND and runny nose.  Coughing up yellow sputum.  Denies fever.  Son had been sick.  Cough  Pertinent negatives include no chest pain, fever or shortness of breath.       The following portions of the patient's history were personally reviewed and updated as appropriate: allergies, current medications, past medical history, past surgical history, past family history, and past social history.     Body mass index is 35.54 kg/m².           Past History:    Medical History: has a past medical history of Acromioclavicular separation (2004), Arthritis, COVID-19, Diabetes, Hyperlipidemia, Knee swelling (2000), Limb pain, and Limb swelling.     Surgical History: has a past surgical history that includes Joint replacement (Left); Hernia repair (1967); Other surgical history; Dental surgery; External ear surgery (Bilateral); and Knee Arthroplasty (Right).     Family History: family history includes Breast cancer (age of onset: 50) in his mother; Cancer in his mother.     Social History: reports that he has never smoked. He has been exposed to tobacco smoke. He has never used smokeless tobacco. He reports that he does not currently use alcohol. He reports that he does not use drugs.    Allergies: Cephalexin          Current Outpatient Medications:     cyclobenzaprine (FLEXERIL) 10 MG tablet, Take 1 tablet by mouth 3 (Three) Times a Day As Needed for Muscle Spasms., Disp: 30 tablet, Rfl: 1    DULoxetine (CYMBALTA) 30 MG capsule, TAKE 1 CAPSULE BY MOUTH DAILY, Disp: 90 capsule, Rfl: 1    etodolac (LODINE) 300 MG capsule, TAKE 1 CAPSULE BY MOUTH TWICE A DAY WITH A MEAL, Disp: 180 capsule, Rfl: 1    gabapentin (NEURONTIN) 300 MG capsule, TAKE 1 CAPSULE BY MOUTH 3 TIMES A DAY, Disp: 270 capsule, Rfl: 0     "rosuvastatin (CRESTOR) 5 MG tablet, Take 1 tablet by mouth Daily., Disp: 90 tablet, Rfl: 3    azithromycin (Zithromax Z-Virgilio) 250 MG tablet, Take 2 tablets by mouth on day 1, then 1 tablet daily on days 2-5, Disp: 6 tablet, Rfl: 0    Semaglutide,0.25 or 0.5MG/DOS, (OZEMPIC) 2 MG/3ML solution pen-injector, Inject 0.25 mg under the skin into the appropriate area as directed 1 (One) Time Per Week., Disp: 3 mL, Rfl: 0    Semaglutide,0.25 or 0.5MG/DOS, (Ozempic, 0.25 or 0.5 MG/DOSE,) 2 MG/3ML solution pen-injector, Inject 0.25 mg under the skin into the appropriate area as directed 1 (One) Time Per Week. (Patient not taking: Reported on 2/26/2025), Disp: 3 mL, Rfl: 1    There are no discontinued medications.      Review of Systems   Constitutional:  Negative for fever.   Respiratory:  Positive for cough. Negative for shortness of breath.    Cardiovascular:  Negative for chest pain, palpitations and leg swelling.   Neurological:  Negative for dizziness, weakness, numbness and headache.        Objective         Vitals:    02/26/25 0820   BP: 120/78   BP Location: Right arm   Patient Position: Sitting   Cuff Size: Adult   Pulse: 60   Resp: 18   Temp: 98.6 °F (37 °C)   TempSrc: Temporal   SpO2: 98%   Weight: 109 kg (240 lb 12.8 oz)   Height: 175.3 cm (69.02\")     Body mass index is 35.54 kg/m².         Physical Exam  Vitals reviewed.   Constitutional:       Appearance: Normal appearance. He is well-developed.   HENT:      Head: Normocephalic and atraumatic.      Right Ear: Tympanic membrane is erythematous.      Left Ear: Tympanic membrane is erythematous.      Mouth/Throat:      Pharynx: Posterior oropharyngeal erythema present. No oropharyngeal exudate.   Eyes:      Conjunctiva/sclera: Conjunctivae normal.      Pupils: Pupils are equal, round, and reactive to light.   Cardiovascular:      Rate and Rhythm: Normal rate and regular rhythm.      Heart sounds: Normal heart sounds. No murmur heard.     No friction rub. No gallop. "   Pulmonary:      Effort: Pulmonary effort is normal.      Breath sounds: Normal breath sounds. No wheezing or rhonchi.   Skin:     General: Skin is warm and dry.   Neurological:      Mental Status: He is alert and oriented to person, place, and time.   Psychiatric:         Mood and Affect: Mood and affect normal.         Behavior: Behavior normal.         Thought Content: Thought content normal.         Judgment: Judgment normal.             Result Review :               Assessment and Plan     Diagnoses and all orders for this visit:    1. Generalized body aches (Primary)  -     POCT SARS-CoV-2 + Flu Antigen NADIA    2. Type 2 diabetes mellitus with hyperglycemia, without long-term current use of insulin  -     Semaglutide,0.25 or 0.5MG/DOS, (OZEMPIC) 2 MG/3ML solution pen-injector; Inject 0.25 mg under the skin into the appropriate area as directed 1 (One) Time Per Week.  Dispense: 3 mL; Refill: 0    3. Acute otitis media, unspecified otitis media type  -     azithromycin (Zithromax Z-Virgilio) 250 MG tablet; Take 2 tablets by mouth on day 1, then 1 tablet daily on days 2-5  Dispense: 6 tablet; Refill: 0        Encouraged to check blood sugars occasionally.  Still a hold up with ozempic will check and find out.      Follow Up     Return for Next scheduled follow up.    Patient was given instructions and counseling regarding his condition or for health maintenance advice. Please see specific information pulled into the AVS if appropriate.

## 2025-03-07 ENCOUNTER — OFFICE VISIT (OUTPATIENT)
Dept: WOUND CARE | Facility: HOSPITAL | Age: 65
End: 2025-03-07
Payer: COMMERCIAL

## 2025-03-07 VITALS
RESPIRATION RATE: 16 BRPM | SYSTOLIC BLOOD PRESSURE: 147 MMHG | HEART RATE: 68 BPM | TEMPERATURE: 98.2 F | DIASTOLIC BLOOD PRESSURE: 72 MMHG

## 2025-03-07 DIAGNOSIS — L97.513 DIABETIC ULCER OF TOE OF RIGHT FOOT ASSOCIATED WITH TYPE 2 DIABETES MELLITUS, WITH NECROSIS OF MUSCLE: ICD-10-CM

## 2025-03-07 DIAGNOSIS — I87.2 VENOUS STASIS DERMATITIS: Primary | ICD-10-CM

## 2025-03-07 DIAGNOSIS — L89.896 PRESSURE INJURY OF DEEP TISSUE OF TOE OF LEFT FOOT: ICD-10-CM

## 2025-03-07 DIAGNOSIS — E11.621 DIABETIC ULCER OF TOE OF RIGHT FOOT ASSOCIATED WITH TYPE 2 DIABETES MELLITUS, WITH NECROSIS OF MUSCLE: ICD-10-CM

## 2025-03-07 PROCEDURE — G0463 HOSPITAL OUTPT CLINIC VISIT: HCPCS | Performed by: PHYSICIAN ASSISTANT

## 2025-03-07 RX ORDER — SODIUM HYPOCHLORITE 1.25 MG/ML
10 SOLUTION TOPICAL 2 TIMES DAILY
Qty: 1000 ML | Refills: 1 | Status: SHIPPED | OUTPATIENT
Start: 2025-03-07

## 2025-03-07 NOTE — PROGRESS NOTES
Chief Complaint  Wound Check (Follow up on right great toe wound.Pt applying collagen daily.  Wife states he has rash on left foot  started about a week ago & it presents same as right did initially.  )    Subjective      History of Present Illness    Idris Park  is a 64 y.o. male here for a wound on his right great toe. It developed several months ago without known injury. He finished Bactrim from his PCP and 6 weeks of augmentin from our clinic for suspected osteomyelitis. He previously saw [facility].  He is on his feet 8 to 10 hours daily and works as a .  He is a diabetic and is on Ozempic.  He does not monitor his blood glucose levels regularly.  His most recent hemoglobin A1c was 8.1.  He is currently applying Cierra Ag to wound daily.  We cut out a hole in his shoe insert at previous appointment in order to offload pressure onto the wound.  He does still have pain sometimes in toe while walking but not other changes.     MRI of right foot completed on 11/7/2024 revealed 1. Susceptibility artifact limiting the exam. There does appear to be some focal edema at the tip of the first distal phalanx with adjacent soft tissue skin ulceration and edema for which osteomyelitis cannot be excluded.     Labs on 11/11/24 revealed an elevated CRP.  Due to this fact, patient was placed on 2 additional weeks of Augmentin to treat for assumed osteomyelitis.  He also was placed on 2 weeks of doxycycline at last appointment due to wound base appearing opaque and stalled.  He notes that he has a small adjacent wound from peeling skin and due to tape.     He also has new callouses to left 2nd and 3rd toes today with developing wounds. He has had recent swelling and a rash to left foot. His wife is present with him today.     Allergies:  Cephalexin      Current Outpatient Medications:     azithromycin (Zithromax Z-Virgilio) 250 MG tablet, Take 2 tablets by mouth on day 1, then 1 tablet daily on days 2-5, Disp: 6  tablet, Rfl: 0    cyclobenzaprine (FLEXERIL) 10 MG tablet, Take 1 tablet by mouth 3 (Three) Times a Day As Needed for Muscle Spasms., Disp: 30 tablet, Rfl: 1    DULoxetine (CYMBALTA) 30 MG capsule, TAKE 1 CAPSULE BY MOUTH DAILY, Disp: 90 capsule, Rfl: 1    etodolac (LODINE) 300 MG capsule, TAKE 1 CAPSULE BY MOUTH TWICE A DAY WITH A MEAL, Disp: 180 capsule, Rfl: 1    gabapentin (NEURONTIN) 300 MG capsule, TAKE 1 CAPSULE BY MOUTH 3 TIMES A DAY, Disp: 270 capsule, Rfl: 0    rosuvastatin (CRESTOR) 5 MG tablet, Take 1 tablet by mouth Daily., Disp: 90 tablet, Rfl: 3    Semaglutide,0.25 or 0.5MG/DOS, (OZEMPIC) 2 MG/3ML solution pen-injector, Inject 0.25 mg under the skin into the appropriate area as directed 1 (One) Time Per Week., Disp: 3 mL, Rfl: 0    Semaglutide,0.25 or 0.5MG/DOS, (Ozempic, 0.25 or 0.5 MG/DOSE,) 2 MG/3ML solution pen-injector, Inject 0.25 mg under the skin into the appropriate area as directed 1 (One) Time Per Week. (Patient not taking: Reported on 2/26/2025), Disp: 3 mL, Rfl: 1    sodium hypochlorite (DAKIN'S 1/4 STRENGTH) 0.125 % solution topical solution 0.125%, Apply 10 mL topically to the appropriate area as directed 2 (Two) Times a Day., Disp: 1000 mL, Rfl: 1    Past Medical History:   Diagnosis Date    Acromioclavicular separation 2004    Arthritis     COVID-19     Diabetes     Hyperlipidemia     Knee swelling 2000    Limb pain     Limb swelling      Past Surgical History:   Procedure Laterality Date    DENTAL PROCEDURE      EXTERNAL EAR SURGERY Bilateral     HERNIA REPAIR  1967    also 2011    JOINT REPLACEMENT Left     Artificial Joints/Limbs    KNEE ARTHROPLASTY Right     OTHER SURGICAL HISTORY      JOINT SURGERY     Social History     Socioeconomic History    Marital status:    Tobacco Use    Smoking status: Never     Passive exposure: Past    Smokeless tobacco: Never   Vaping Use    Vaping status: Never Used   Substance and Sexual Activity    Alcohol use: Not Currently     Comment:  socially-BEER MONTHLY    Drug use: Never    Sexual activity: Yes     Partners: Female     Birth control/protection: None     Objective     Vitals:    03/07/25 1501   BP: 147/72   BP Location: Right arm   Patient Position: Sitting   Cuff Size: Adult   Pulse: 68   Resp: 16  Comment: o2 sat 96%   Temp: 98.2 °F (36.8 °C)   PainSc: 0-No pain     Review of Systems     ROS:  Per HPI.     I have reviewed the HPI and ROS as documented by MA/RN. Valentina Trotter PA-C    Physical Exam     NAD  AAOx3, pleasant, cooperative    Right great toe: Patient has an open wound to the distal aspect of the patient's right great toe.  The wound has not changed much since last appointment.  It may be a bit smaller.  There is overlying callous but wound remains beneath. The base appears healthy. There is callous build up around wound today. This was peeled away. There is adjacent skin peeling today. No signs of infection.     2nd and 3rd toes to left foot: distal tips has pressure injury with callous buildup and hemosiderin deposit. Callous peeled away today. He does have a slight dry rash to shin and left inner foot.     See photos for details:    Right great toe:              Result Review :  The following data was reviewed by: Valentina Trotter PA-C on 03/07/2025:    Prior notes and images.         Assessment and Plan   Diagnoses and all orders for this visit:    1. Venous stasis dermatitis (Primary)    2. Diabetic ulcer of toe of right foot associated with type 2 diabetes mellitus, with necrosis of muscle    3. Pressure injury of deep tissue of toe of left foot    Other orders  -     sodium hypochlorite (DAKIN'S 1/4 STRENGTH) 0.125 % solution topical solution 0.125%; Apply 10 mL topically to the appropriate area as directed 2 (Two) Times a Day.  Dispense: 1000 mL; Refill: 1      We will now have patient apply Dakin's wet to dry dressing changes to his left great toe wound, change twice daily.  His wife was educated on this plan  today.  She will be the 1 performing the dressing changes.  We did apply triamcinolone to the left ankle today but I do not feel that it needs it any additionally as it is almost completely healed up.  Patient has been having swelling and a rash to his legs frequently and I feel that he now has venous stasis and venous stasis dermatitis.  He has new pressure wounds to his second and third toes on his left foot today.  We will now have him apply Betadine to these areas and keep them dry.  He could consider being refitted for shoes as I feel that his shoes may be causing the pressure to his toes.  His left great toe wound is still slow healing.  We are treating patient for assumed osteomyelitis.  He has finished Augmentin for osteomyelitis treatment. We did cut out a hole in his shoe insert previously to offload pressure onto the wound.  We discussed the importance of offloading the wound as much as possible as well as staying off the foot is much as possible. We also discussed the importance of monitoring his blood glucose levels very closely as well is consuming a healthy diet with plenty of protein to allow for wound healing.  He will follow back up with us in 3 weeks.  He finished doxycycline for 3 weeks.  He will call us with any other worsening symptoms otherwise.    I spent 42 minutes in both face-to-face and nonface-to-face time for patient care today including, but not limited to, review of old records, reviewing old images, history and physical exam, reviewing test results, counseling patient, entering orders, coordinating care, and documenting.       Patient was given instructions and counseling regarding their condition or for health maintenance advice, as well as the wound care plan and recommendations. They understand and agree with the plan.  They will follow back up here in the clinic but are instructed to contact us in the interim should they have any new, returning, or worsening symptoms or concerns.  Please see specific information pulled into the AVS if appropriate.     Dragon Dictation utilized for chart completion.    Follow Up   Return in about 3 weeks (around 3/28/2025) for Recheck.      Valentina Trotter PA-C

## 2025-03-28 ENCOUNTER — OFFICE VISIT (OUTPATIENT)
Dept: WOUND CARE | Facility: HOSPITAL | Age: 65
End: 2025-03-28
Payer: COMMERCIAL

## 2025-03-28 VITALS
RESPIRATION RATE: 18 BRPM | SYSTOLIC BLOOD PRESSURE: 137 MMHG | HEART RATE: 60 BPM | TEMPERATURE: 97.4 F | DIASTOLIC BLOOD PRESSURE: 78 MMHG

## 2025-03-28 DIAGNOSIS — E11.621 DIABETIC ULCER OF TOE OF RIGHT FOOT ASSOCIATED WITH TYPE 2 DIABETES MELLITUS, WITH NECROSIS OF MUSCLE: Primary | ICD-10-CM

## 2025-03-28 DIAGNOSIS — L97.513 DIABETIC ULCER OF TOE OF RIGHT FOOT ASSOCIATED WITH TYPE 2 DIABETES MELLITUS, WITH NECROSIS OF MUSCLE: Primary | ICD-10-CM

## 2025-03-28 DIAGNOSIS — E11.621 DIABETIC ULCER OF TOE OF LEFT FOOT ASSOCIATED WITH TYPE 2 DIABETES MELLITUS, LIMITED TO BREAKDOWN OF SKIN: ICD-10-CM

## 2025-03-28 DIAGNOSIS — L97.521 DIABETIC ULCER OF TOE OF LEFT FOOT ASSOCIATED WITH TYPE 2 DIABETES MELLITUS, LIMITED TO BREAKDOWN OF SKIN: ICD-10-CM

## 2025-03-28 PROCEDURE — 87205 SMEAR GRAM STAIN: CPT | Performed by: PHYSICIAN ASSISTANT

## 2025-03-28 PROCEDURE — 87186 SC STD MICRODIL/AGAR DIL: CPT | Performed by: PHYSICIAN ASSISTANT

## 2025-03-28 PROCEDURE — 87077 CULTURE AEROBIC IDENTIFY: CPT | Performed by: PHYSICIAN ASSISTANT

## 2025-03-28 PROCEDURE — 87070 CULTURE OTHR SPECIMN AEROBIC: CPT | Performed by: PHYSICIAN ASSISTANT

## 2025-03-28 PROCEDURE — G0463 HOSPITAL OUTPT CLINIC VISIT: HCPCS | Performed by: PHYSICIAN ASSISTANT

## 2025-03-28 RX ORDER — DOXYCYCLINE 100 MG/1
100 CAPSULE ORAL 2 TIMES DAILY
Qty: 14 CAPSULE | Refills: 0 | Status: SHIPPED | OUTPATIENT
Start: 2025-03-28 | End: 2025-04-04

## 2025-03-28 NOTE — PROGRESS NOTES
Chief Complaint  Wound Check (Follow up visit for wound to right foot and left toes.  Patient is diabetic.  Patient does not check his blood sugar.)    Subjective      History of Present Illness    Idris Park  is a 65 y.o. male here for a wound on his right great toe. It developed several months ago without known injury. He finished Bactrim from his PCP and 6 weeks of augmentin from our clinic for suspected osteomyelitis. He previously [facility].  He is on his feet 8 to 10 hours daily and works as a .  He is a diabetic. He never started the ozympic that was prescribed to him.   He does not monitor his blood glucose levels regularly.  His most recent hemoglobin A1c was 8.1.  He is currently applying dakins wet to dry dressing to wound daily.  We cut out a hole in his shoe insert at previous appointment in order to offload pressure onto the wound.      MRI of right foot completed on 11/7/2024 revealed 1. Susceptibility artifact limiting the exam. There does appear to be some focal edema at the tip of the first distal phalanx with adjacent soft tissue skin ulceration and edema for which osteomyelitis cannot be excluded.     Labs on 11/11/24 revealed an elevated CRP.  Due to this fact, patient was placed on 2 additional weeks of Augmentin to treat for assumed osteomyelitis.      He also has callouses to left 2nd and 3rd toes. He pulled a piece of skin on his 3rd toe which worsened this wound since last appointment.  His left great toe also developed a blister several days ago. It became red and then the blister popped leaving a central wound. He is unsure why this wound developed. He is not currently on any antibiotics.  He is currently applying triple antibiotic ointment to this area.    Allergies:  Cephalexin      Current Outpatient Medications:     azithromycin (Zithromax Z-Virgilio) 250 MG tablet, Take 2 tablets by mouth on day 1, then 1 tablet daily on days 2-5, Disp: 6 tablet, Rfl: 0     cyclobenzaprine (FLEXERIL) 10 MG tablet, Take 1 tablet by mouth 3 (Three) Times a Day As Needed for Muscle Spasms., Disp: 30 tablet, Rfl: 1    doxycycline (VIBRAMYCIN) 100 MG capsule, Take 1 capsule by mouth 2 (Two) Times a Day for 7 days. Do not take at the same time as any vitamin or mineral supplements., Disp: 14 capsule, Rfl: 0    DULoxetine (CYMBALTA) 30 MG capsule, TAKE 1 CAPSULE BY MOUTH DAILY, Disp: 90 capsule, Rfl: 1    etodolac (LODINE) 300 MG capsule, TAKE 1 CAPSULE BY MOUTH TWICE A DAY WITH A MEAL, Disp: 180 capsule, Rfl: 1    gabapentin (NEURONTIN) 300 MG capsule, TAKE 1 CAPSULE BY MOUTH 3 TIMES A DAY, Disp: 270 capsule, Rfl: 0    rosuvastatin (CRESTOR) 5 MG tablet, Take 1 tablet by mouth Daily., Disp: 90 tablet, Rfl: 3    Semaglutide,0.25 or 0.5MG/DOS, (OZEMPIC) 2 MG/3ML solution pen-injector, Inject 0.25 mg under the skin into the appropriate area as directed 1 (One) Time Per Week. (Patient not taking: Reported on 3/28/2025), Disp: 3 mL, Rfl: 0    sodium hypochlorite (DAKIN'S 1/4 STRENGTH) 0.125 % solution topical solution 0.125%, Apply 10 mL topically to the appropriate area as directed 2 (Two) Times a Day., Disp: 1000 mL, Rfl: 1    Past Medical History:   Diagnosis Date    Acromioclavicular separation 2004    Arthritis     COVID-19     Diabetes     Hyperlipidemia     Knee swelling 2000    Limb pain     Limb swelling      Past Surgical History:   Procedure Laterality Date    DENTAL PROCEDURE      EXTERNAL EAR SURGERY Bilateral     HERNIA REPAIR  1967    also 2011    JOINT REPLACEMENT Left     Artificial Joints/Limbs    KNEE ARTHROPLASTY Right     OTHER SURGICAL HISTORY      JOINT SURGERY     Social History     Socioeconomic History    Marital status:    Tobacco Use    Smoking status: Never     Passive exposure: Past    Smokeless tobacco: Never   Vaping Use    Vaping status: Never Used   Substance and Sexual Activity    Alcohol use: Not Currently     Comment: socially-BEER MONTHLY    Drug use:  Never    Sexual activity: Yes     Partners: Female     Birth control/protection: None     Objective     Vitals:    03/28/25 0824   BP: 137/78   BP Location: Right arm   Patient Position: Sitting   Cuff Size: Adult   Pulse: 60   Resp: 18  Comment: 96% room air   Temp: 97.4 °F (36.3 °C)   TempSrc: Temporal   PainSc: 0-No pain     Review of Systems     ROS:  Per HPI.     I have reviewed the HPI and ROS as documented by MA/RN. Valentina Trotter PA-C    Physical Exam     NAD  AAOx3, pleasant, cooperative    Right great toe: Patient has an open wound to the distal aspect of the patient's right great toe.  The wound has not changed much since last appointment.  It may be a bit smaller.  There is increased maceration around wound today.  The base appears healthy. There is callous build up around wound today. This was peeled away.  No signs of infection.    2nd and 3rd toes to left foot: distal tips has pressure injury with callous buildup and hemosiderin deposit. Skin tear noted on distal aspect of 3rd toe near pressure wound.     Left great toe: Patient has a blister to the dorsal aspect of his left great toe.  It does have a central wound.  This wound does have an opaque base today.  There is mild to moderate drainage from wound.  A wound culture was obtained from this area today.  There is surrounding erythema around blister and wound today.    See photos for details:    Left toe:          Right great toe:      Result Review :  The following data was reviewed by: Valentina Trotter PA-C on 03/28/2025:    Prior notes and images.         Assessment and Plan   Diagnoses and all orders for this visit:    1. Diabetic ulcer of toe of right foot associated with type 2 diabetes mellitus, with necrosis of muscle (Primary)  -     Wound Culture - Wound, Toe, Right    2. Diabetic ulcer of toe of left foot associated with type 2 diabetes mellitus, limited to breakdown of skin    Other orders  -     doxycycline (VIBRAMYCIN) 100  MG capsule; Take 1 capsule by mouth 2 (Two) Times a Day for 7 days. Do not take at the same time as any vitamin or mineral supplements.  Dispense: 14 capsule; Refill: 0      Patient has a new wound to his left great toe today.  There is surrounding erythema around this wound today. Obtained a wound culture from left great toe wound today. We will place him on doxycycline today.  We will call him once wound culture results if we need to change antibiotic.    His right great toe wound is still slow healing.  We will now have patient apply betadine to right great toe wound and left 2nd and 3rd toe wounds. He should continue to apply KAILEY to his left great toe wound. His wife does assist with dressing changes at times.     He could consider being refitted for shoes as I feel that his shoes may be causing the pressure to his toes.   We have treated patient for assumed osteomyelitis of right great toe.  He has finished Augmentin for osteomyelitis treatment. We did cut out a hole in his shoe insert previously to offload pressure onto the wound.  We discussed the importance of offloading the wound as much as possible as well as staying off the foot is much as possible. We also discussed the importance of monitoring his blood glucose levels very closely as well is consuming a healthy diet with plenty of protein to allow for wound healing.  He will follow back up with us in 3 weeks.  He has previously finished doxycycline for 3 weeks.  He will call us with any other worsening symptoms otherwise.    Patient was given instructions and counseling regarding their condition or for health maintenance advice, as well as the wound care plan and recommendations. They understand and agree with the plan.  They will follow back up here in the clinic but are instructed to contact us in the interim should they have any new, returning, or worsening symptoms or concerns. Please see specific information pulled into the AVS if appropriate.      Cordelia Dictation utilized for chart completion.    Follow Up   Return in about 3 weeks (around 4/18/2025) for Recheck.      Valentina Trotter PA-C

## 2025-03-28 NOTE — PATIENT INSTRUCTIONS
Patient reinstructed on glucose monitoring and control, protein intake, offloading foot and much as possible, follow up in 3 weeks, call with any issues or concerns.  Patient acknowledges understanding.

## 2025-03-30 LAB
BACTERIA SPEC AEROBE CULT: ABNORMAL
GRAM STN SPEC: ABNORMAL

## 2025-03-31 ENCOUNTER — DOCUMENTATION (OUTPATIENT)
Dept: WOUND CARE | Facility: HOSPITAL | Age: 65
End: 2025-03-31
Payer: COMMERCIAL

## 2025-04-18 ENCOUNTER — OFFICE VISIT (OUTPATIENT)
Dept: WOUND CARE | Facility: HOSPITAL | Age: 65
End: 2025-04-18
Payer: COMMERCIAL

## 2025-04-18 VITALS
HEART RATE: 66 BPM | SYSTOLIC BLOOD PRESSURE: 151 MMHG | TEMPERATURE: 98.4 F | DIASTOLIC BLOOD PRESSURE: 74 MMHG | RESPIRATION RATE: 20 BRPM

## 2025-04-18 DIAGNOSIS — L97.521 DIABETIC ULCER OF TOE OF LEFT FOOT ASSOCIATED WITH TYPE 2 DIABETES MELLITUS, LIMITED TO BREAKDOWN OF SKIN: ICD-10-CM

## 2025-04-18 DIAGNOSIS — L89.896 PRESSURE INJURY OF DEEP TISSUE OF TOE OF LEFT FOOT: ICD-10-CM

## 2025-04-18 DIAGNOSIS — E11.621 DIABETIC ULCER OF TOE OF LEFT FOOT ASSOCIATED WITH TYPE 2 DIABETES MELLITUS, LIMITED TO BREAKDOWN OF SKIN: ICD-10-CM

## 2025-04-18 DIAGNOSIS — L97.513 DIABETIC ULCER OF TOE OF RIGHT FOOT ASSOCIATED WITH TYPE 2 DIABETES MELLITUS, WITH NECROSIS OF MUSCLE: Primary | ICD-10-CM

## 2025-04-18 DIAGNOSIS — E11.621 DIABETIC ULCER OF TOE OF RIGHT FOOT ASSOCIATED WITH TYPE 2 DIABETES MELLITUS, WITH NECROSIS OF MUSCLE: Primary | ICD-10-CM

## 2025-04-18 PROCEDURE — G0463 HOSPITAL OUTPT CLINIC VISIT: HCPCS | Performed by: PHYSICIAN ASSISTANT

## 2025-04-18 NOTE — PROGRESS NOTES
Chief Complaint  Wound Check (Patient is here for a wound check on bilateral feet.  He uses dakins moistened gauze on his right foot and betadine on his left foot, changed daily.  He is diabetic and doesn't check his blood sugar.  Pain level is 0/10.)    Subjective      History of Present Illness    Idris Park  is a 65 y.o. male here for a wound on his right great toe. It developed several months ago without known injury. He finished Bactrim from his PCP and 6 weeks of augmentin from our clinic for suspected osteomyelitis. He previously saw [facility].  He is on his feet 8 to 10 hours daily and works as a .  He is a diabetic. He never started the ozympic that was prescribed to him.   He does not monitor his blood glucose levels regularly.  His most recent hemoglobin A1c was 8.1.   We cut out a hole in his shoe insert at previous appointment in order to offload pressure onto the wound.      MRI of right foot completed on 11/7/2024 revealed 1. Susceptibility artifact limiting the exam. There does appear to be some focal edema at the tip of the first distal phalanx with adjacent soft tissue skin ulceration and edema for which osteomyelitis cannot be excluded.     Labs on 11/11/24 revealed an elevated CRP.      He also has callouses to left 2nd and 3rd toes. His left great toe also developed a blister several weeks ago. It became red and then the blister popped leaving a central wound. He is currently applying dakins wet to dry dressing to wounds daily.     Wound culture on 3/28/2025 grew Staph aureus.  Patient was placed on Augmentin and recently finished this.  He notes that this did significantly help all of his wounds.  His wounds have improved since last appointment.      Allergies:  Cephalexin      Current Outpatient Medications:     amoxicillin-clavulanate (AUGMENTIN) 875-125 MG per tablet, Take 1 tablet by mouth 2 (Two) Times a Day for 14 days., Disp: 28 tablet, Rfl: 0    azithromycin (Zithromax  Z-Virgilio) 250 MG tablet, Take 2 tablets by mouth on day 1, then 1 tablet daily on days 2-5, Disp: 6 tablet, Rfl: 0    cyclobenzaprine (FLEXERIL) 10 MG tablet, Take 1 tablet by mouth 3 (Three) Times a Day As Needed for Muscle Spasms., Disp: 30 tablet, Rfl: 1    DULoxetine (CYMBALTA) 30 MG capsule, TAKE 1 CAPSULE BY MOUTH DAILY, Disp: 90 capsule, Rfl: 1    etodolac (LODINE) 300 MG capsule, TAKE 1 CAPSULE BY MOUTH TWICE A DAY WITH A MEAL, Disp: 180 capsule, Rfl: 1    gabapentin (NEURONTIN) 300 MG capsule, TAKE 1 CAPSULE BY MOUTH 3 TIMES A DAY, Disp: 270 capsule, Rfl: 0    rosuvastatin (CRESTOR) 5 MG tablet, Take 1 tablet by mouth Daily., Disp: 90 tablet, Rfl: 3    Semaglutide,0.25 or 0.5MG/DOS, (OZEMPIC) 2 MG/3ML solution pen-injector, Inject 0.25 mg under the skin into the appropriate area as directed 1 (One) Time Per Week. (Patient not taking: Reported on 3/28/2025), Disp: 3 mL, Rfl: 0    sodium hypochlorite (DAKIN'S 1/4 STRENGTH) 0.125 % solution topical solution 0.125%, Apply 10 mL topically to the appropriate area as directed 2 (Two) Times a Day., Disp: 1000 mL, Rfl: 1    Past Medical History:   Diagnosis Date    Acromioclavicular separation 2004    Arthritis     COVID-19     Diabetes     Hyperlipidemia     Knee swelling 2000    Limb pain     Limb swelling      Past Surgical History:   Procedure Laterality Date    DENTAL PROCEDURE      EXTERNAL EAR SURGERY Bilateral     HERNIA REPAIR  1967    also 2011    JOINT REPLACEMENT Left     Artificial Joints/Limbs    KNEE ARTHROPLASTY Right     OTHER SURGICAL HISTORY      JOINT SURGERY     Social History     Socioeconomic History    Marital status:    Tobacco Use    Smoking status: Never     Passive exposure: Past    Smokeless tobacco: Never   Vaping Use    Vaping status: Never Used   Substance and Sexual Activity    Alcohol use: Not Currently     Comment: socially-BEER MONTHLY    Drug use: Never    Sexual activity: Yes     Partners: Female     Birth  control/protection: None     Objective     Vitals:    04/18/25 0822   BP: 151/74   BP Location: Right arm   Patient Position: Sitting   Cuff Size: Adult   Pulse: 66   Resp: 20  Comment: 97% RA   Temp: 98.4 °F (36.9 °C)   TempSrc: Temporal   PainSc: 0-No pain     Review of Systems     ROS:  Per HPI.     I have reviewed the HPI and ROS as documented by MA/RN. Valentina Trotter PA-C    Physical Exam     NAD  AAOx3, pleasant, cooperative    Right great toe: Patient has an open wound to the distal aspect of the patient's right great toe.  Wound appears more healthy today.  The base appears healthy. There is callous build up around wound today. This was peeled away.  No signs of infection.    2nd and 3rd toes to left foot: distal tips has pressure injury with callous buildup and hemosiderin deposit. Skin tear noted on distal aspect of 3rd toe near pressure wound.  These have improved today.    Left great toe: Patient has a blister to the dorsal aspect of his left great toe.  It does have a central wound.  This wound does have an opaque base today.  There is mild drainage from wound.  Surrounding erythema has improved.  See photos for details:    Left toes:          Right great toe:      Result Review :  The following data was reviewed by: Valentina Trotter PA-C on 04/18/2025:    Prior notes and images.         Assessment and Plan   Diagnoses and all orders for this visit:    1. Diabetic ulcer of toe of right foot associated with type 2 diabetes mellitus, with necrosis of muscle (Primary)    2. Diabetic ulcer of toe of left foot associated with type 2 diabetes mellitus, limited to breakdown of skin    3. Pressure injury of deep tissue of toe of left foot    Other orders  -     amoxicillin-clavulanate (AUGMENTIN) 875-125 MG per tablet; Take 1 tablet by mouth 2 (Two) Times a Day for 14 days.  Dispense: 28 tablet; Refill: 0      Obtained a wound culture from left great toe at last appointment.  It did show Staph  aureus.  We placed patient on Augmentin.  Due to a history of osteomyelitis and right great toe we will extend his Augmentin for 2 additional weeks to hopefully completely resolve wound.    His right great toe wound is still slow healing.  He has some pressure injuries to the toes of his left foot as well as a healing wound to his left great toe.  He will continue to apply Dakin's wet-to-dry dressing changes to wounds.  His wife does assist with dressing changes at times.     He could consider being refitted for shoes as I feel that his shoes may be causing the pressure to his toes. We did cut out a hole in his shoe insert previously to offload pressure onto the wound.  We discussed the importance of offloading the wound as much as possible as well as staying off the foot is much as possible. We also discussed the importance of monitoring his blood glucose levels very closely as well is consuming a healthy diet with plenty of protein to allow for wound healing.  He will follow back up with us in 3 weeks. He will call us with any other worsening symptoms otherwise.    I spent 30 minutes in both face-to-face and nonface-to-face time for patient care today including, but not limited to, review of old records, reviewing old images, history and physical exam, reviewing test results, counseling patient, entering orders, coordinating care, and documenting.     Patient was given instructions and counseling regarding their condition or for health maintenance advice, as well as the wound care plan and recommendations. They understand and agree with the plan.  They will follow back up here in the clinic but are instructed to contact us in the interim should they have any new, returning, or worsening symptoms or concerns. Please see specific information pulled into the AVS if appropriate.     Dragon Dictation utilized for chart completion.    Follow Up   Return in about 3 weeks (around 5/9/2025) for Recheck.      Valentina Hartley  RUDY Trotter

## 2025-04-21 DIAGNOSIS — M19.90 ARTHRITIS: ICD-10-CM

## 2025-04-21 RX ORDER — ETODOLAC 300 MG/1
CAPSULE ORAL
Qty: 180 CAPSULE | Refills: 1 | Status: SHIPPED | OUTPATIENT
Start: 2025-04-21

## 2025-04-27 NOTE — PROGRESS NOTES
"Chief Complaint  Pain and Follow-up of the Right Knee     Subjective    History of Present Illness  The patient is a 65-year-old male here to follow up on his right knee. He has right knee osteoarthritis and has been managing it conservatively with intermittent injections. His last injection was administered by Dr. Campos on 12/23/2024.    Discomfort in the right knee has been reported for the past month. He prefers to contact the clinic when he feels the need for another injection rather than scheduling an appointment in advance. Care under Dr. Melchor has been ongoing since 2022, history of left TKA, and he is attempting to postpone knee replacement surgery as long as possible. Gel treatment has not been tried due to insurance coverage issues.    He is diabetic.      Allergies   Allergen Reactions    Cephalexin Rash        Social History     Socioeconomic History    Marital status:    Tobacco Use    Smoking status: Never     Passive exposure: Past    Smokeless tobacco: Never   Vaping Use    Vaping status: Never Used   Substance and Sexual Activity    Alcohol use: Not Currently     Comment: socially-BEER MONTHLY    Drug use: Never    Sexual activity: Yes     Partners: Female     Birth control/protection: None        Tobacco Use: Low Risk  (4/30/2025)    Patient History     Smoking Tobacco Use: Never     Smokeless Tobacco Use: Never     Passive Exposure: Past     Patient reports that they are a nonsmoker; cessation education not applicable.     I reviewed the patient's chief complaint, history of present illness, review of systems, past medical history, surgical history, family history, social history, medications, and allergy list.     Review of Systems     Constitutional: Denies fevers, chills, weight loss  Cardiovascular: Denies chest pain, shortness of breath  Skin: Denies rashes, acute skin changes  Neurologic: Denies headache, loss of consciousness    Vital Signs:   /74   Pulse 67   Ht 175.3 cm (69\") "   Wt 109 kg (241 lb 2.9 oz)   SpO2 96%   BMI 35.62 kg/m²          Physical Exam  General: Alert. No acute distress    Ortho Exam    General: Alert, no acute distress  Right lower extremity: Mild knee effusion.  No tenderness to the medial joint line.  No tenderness to the lateral joint line.  150 degrees of flexion.  Neuro degrees extension. Knee extensor mechanism intact. Knee stable to varus and valgus stress. Calf soft, nontender. Demonstrates active ankle plantarflexion and dorsiflexion. Sensation intact over the dorsal and plantar foot.  Palpable pedal pulses.          Physical Exam  Musculoskeletal:  Right Knee: Crepitus noted. No joint tenderness.      Large Joint Arthrocentesis: R knee  Date/Time: 4/30/2025 9:10 AM  Consent given by: patient  Site marked: site marked  Timeout: Immediately prior to procedure a time out was called to verify the correct patient, procedure, equipment, support staff and site/side marked as required   Supporting Documentation  Indications: pain   Procedure Details  Location: knee - R knee  Preparation: Patient was prepped and draped in the usual sterile fashion  Needle gauge: 21 G.  Approach: lateral  Medications administered: 5 mL lidocaine PF 1% 1 %; 40 mg triamcinolone acetonide 40 MG/ML  Patient tolerance: patient tolerated the procedure well with no immediate complications        This injection documentation was Scribed for Aviva Najera PA-C by Ami Elliott.  04/30/25   09:19 EDT             Assessment and Plan     Diagnoses and all orders for this visit:    1. Right knee pain, unspecified chronicity (Primary)    2. Osteoarthritis of right knee, unspecified osteoarthritis type    Other orders  -     Large Joint Arthrocentesis: R knee      Right knee Steroid injection administered in office today and tolerated the procedure well without complications.  Patient advised on 3 months duration between injections.     Advised of diabetic to monitor blood glucose levels  closely for the next 24 to 48 hours.    Discussed the risk, benefits and alternatives of injection.  Discussed potential complications such as increased pain, swelling and tenderness at the injection site.  Possibility of reaction.  Possibility that injection may not improve pain. Risk of infection.  Possibility of worsening pain after injection. Steroid injections can increase blood glucose levels and may be damaged bone if given over prolonged period of time.  Patient verbalized understanding and gives verbal consent to proceed.      Discussed follow-up, patient will follow-up as needed  Call with questions, concerns or worsening symptoms.    Assessment & Plan      Patient or patient representative verbalized consent for the use of Ambient Listening during the visit with  Aviva Najera PA-C for chart documentation. 4/30/2025  09:40 EDT    Follow Up   There are no Patient Instructions on file for this visit.        Patient was given instructions and counseling regarding his condition or for health maintenance advice. Please see specific information pulled into the AVS if appropriate.     Aviva Najera PA-C   04/30/2025  12:11 EDT        Dictated Utilizing Dragon Dictation. Please note that portions of this note were completed with a voice recognition program. Part of this note may be an electronic transcription/translation of spoken language to printed text using the Dragon Dictation System.

## 2025-04-30 ENCOUNTER — OFFICE VISIT (OUTPATIENT)
Dept: ORTHOPEDIC SURGERY | Facility: CLINIC | Age: 65
End: 2025-04-30
Payer: COMMERCIAL

## 2025-04-30 VITALS
HEIGHT: 69 IN | WEIGHT: 241.18 LBS | SYSTOLIC BLOOD PRESSURE: 152 MMHG | OXYGEN SATURATION: 96 % | HEART RATE: 67 BPM | BODY MASS INDEX: 35.72 KG/M2 | DIASTOLIC BLOOD PRESSURE: 74 MMHG

## 2025-04-30 DIAGNOSIS — M17.11 OSTEOARTHRITIS OF RIGHT KNEE, UNSPECIFIED OSTEOARTHRITIS TYPE: ICD-10-CM

## 2025-04-30 DIAGNOSIS — M25.561 RIGHT KNEE PAIN, UNSPECIFIED CHRONICITY: Primary | ICD-10-CM

## 2025-04-30 RX ORDER — TRIAMCINOLONE ACETONIDE 40 MG/ML
40 INJECTION, SUSPENSION INTRA-ARTICULAR; INTRAMUSCULAR
Status: COMPLETED | OUTPATIENT
Start: 2025-04-30 | End: 2025-04-30

## 2025-04-30 RX ORDER — LIDOCAINE HYDROCHLORIDE 10 MG/ML
5 INJECTION, SOLUTION EPIDURAL; INFILTRATION; INTRACAUDAL; PERINEURAL
Status: COMPLETED | OUTPATIENT
Start: 2025-04-30 | End: 2025-04-30

## 2025-04-30 RX ADMIN — TRIAMCINOLONE ACETONIDE 40 MG: 40 INJECTION, SUSPENSION INTRA-ARTICULAR; INTRAMUSCULAR at 09:10

## 2025-04-30 RX ADMIN — LIDOCAINE HYDROCHLORIDE 5 ML: 10 INJECTION, SOLUTION EPIDURAL; INFILTRATION; INTRACAUDAL; PERINEURAL at 09:10

## 2025-05-09 ENCOUNTER — OFFICE VISIT (OUTPATIENT)
Dept: WOUND CARE | Facility: HOSPITAL | Age: 65
End: 2025-05-09
Payer: COMMERCIAL

## 2025-05-09 VITALS
DIASTOLIC BLOOD PRESSURE: 75 MMHG | SYSTOLIC BLOOD PRESSURE: 141 MMHG | RESPIRATION RATE: 18 BRPM | HEART RATE: 70 BPM | TEMPERATURE: 97.4 F

## 2025-05-09 DIAGNOSIS — L97.521 DIABETIC ULCER OF TOE OF LEFT FOOT ASSOCIATED WITH TYPE 2 DIABETES MELLITUS, LIMITED TO BREAKDOWN OF SKIN: ICD-10-CM

## 2025-05-09 DIAGNOSIS — E11.621 DIABETIC ULCER OF TOE OF RIGHT FOOT ASSOCIATED WITH TYPE 2 DIABETES MELLITUS, WITH NECROSIS OF MUSCLE: Primary | ICD-10-CM

## 2025-05-09 DIAGNOSIS — L97.513 DIABETIC ULCER OF TOE OF RIGHT FOOT ASSOCIATED WITH TYPE 2 DIABETES MELLITUS, WITH NECROSIS OF MUSCLE: Primary | ICD-10-CM

## 2025-05-09 DIAGNOSIS — E11.621 DIABETIC ULCER OF TOE OF LEFT FOOT ASSOCIATED WITH TYPE 2 DIABETES MELLITUS, LIMITED TO BREAKDOWN OF SKIN: ICD-10-CM

## 2025-05-09 PROCEDURE — G0463 HOSPITAL OUTPT CLINIC VISIT: HCPCS | Performed by: PHYSICIAN ASSISTANT

## 2025-05-09 NOTE — PROGRESS NOTES
Chief Complaint  Wound Check (Patient is here for a bilateral foot wound check. He uses dakins moistened gauze, changed daily. Patient is diabetic but doesn't check his sugars.  No pain at this time. He just finished augmentin. )    Subjective      History of Present Illness    Idris Park  is a 65 y.o. male here for a wound on his right great toe. It developed several months ago without known injury. He finished 6 weeks of augmentin from our clinic for suspected osteomyelitis. He previously saw [facility].  He is on his feet 8 to 10 hours daily and works as a .  He is a diabetic. He never started the ozympic that was prescribed to him.   He does not monitor his blood glucose levels regularly.  His most recent hemoglobin A1c was 8.1.   We cut out a hole in his shoe insert at previous appointment in order to offload pressure onto the wound.      MRI of right foot completed on 11/7/2024 revealed 1. Susceptibility artifact limiting the exam. There does appear to be some focal edema at the tip of the first distal phalanx with adjacent soft tissue skin ulceration and edema for which osteomyelitis cannot be excluded.     Labs on 11/11/24 revealed an elevated CRP.      He also has callouses to left 2nd and 3rd toes. His left great toe also developed a blister several weeks ago. It became red and then the blister popped leaving a central wound. He is currently applying dakins wet to dry dressing to wounds daily. It is now mostly healed and just a callous.     Wound culture on 3/28/2025 grew Staph aureus.  Patient was placed on Augmentin and recently finished this.  He notes that this did significantly help all of his wounds.  His wounds have improved since last appointment.    Allergies:  Cephalexin      Current Outpatient Medications:     azithromycin (Zithromax Z-Virgilio) 250 MG tablet, Take 2 tablets by mouth on day 1, then 1 tablet daily on days 2-5, Disp: 6 tablet, Rfl: 0    cyclobenzaprine (FLEXERIL) 10 MG  tablet, Take 1 tablet by mouth 3 (Three) Times a Day As Needed for Muscle Spasms., Disp: 30 tablet, Rfl: 1    DULoxetine (CYMBALTA) 30 MG capsule, TAKE 1 CAPSULE BY MOUTH DAILY, Disp: 90 capsule, Rfl: 1    etodolac (LODINE) 300 MG capsule, TAKE 1 CAPSULE BY MOUTH TWICE A DAY WITH A MEAL, Disp: 180 capsule, Rfl: 1    gabapentin (NEURONTIN) 300 MG capsule, TAKE 1 CAPSULE BY MOUTH 3 TIMES A DAY, Disp: 270 capsule, Rfl: 0    rosuvastatin (CRESTOR) 5 MG tablet, Take 1 tablet by mouth Daily., Disp: 90 tablet, Rfl: 3    Semaglutide,0.25 or 0.5MG/DOS, (OZEMPIC) 2 MG/3ML solution pen-injector, Inject 0.25 mg under the skin into the appropriate area as directed 1 (One) Time Per Week. (Patient not taking: Reported on 4/30/2025), Disp: 3 mL, Rfl: 0    sodium hypochlorite (DAKIN'S 1/4 STRENGTH) 0.125 % solution topical solution 0.125%, Apply 10 mL topically to the appropriate area as directed 2 (Two) Times a Day., Disp: 1000 mL, Rfl: 1    Past Medical History:   Diagnosis Date    Acromioclavicular separation 2004    Arthritis     COVID-19     Diabetes     Hyperlipidemia     Knee swelling 2000    Limb pain     Limb swelling      Past Surgical History:   Procedure Laterality Date    DENTAL PROCEDURE      EXTERNAL EAR SURGERY Bilateral     HERNIA REPAIR  1967    also 2011    JOINT REPLACEMENT Left     Artificial Joints/Limbs    KNEE ARTHROPLASTY Right     OTHER SURGICAL HISTORY      JOINT SURGERY     Social History     Socioeconomic History    Marital status:    Tobacco Use    Smoking status: Never     Passive exposure: Past    Smokeless tobacco: Never   Vaping Use    Vaping status: Never Used   Substance and Sexual Activity    Alcohol use: Not Currently     Comment: socially-BEER MONTHLY    Drug use: Never    Sexual activity: Yes     Partners: Female     Birth control/protection: None     Objective     Vitals:    05/09/25 0827   BP: 141/75   BP Location: Right arm   Patient Position: Sitting   Cuff Size: Adult   Pulse: 70    Resp: 18  Comment: 98% RA   Temp: 97.4 °F (36.3 °C)   TempSrc: Temporal   PainSc: 0-No pain     Review of Systems     ROS:  Per HPI.     I have reviewed the HPI and ROS as documented by MA/RN. Valentina Trotter PA-C    Physical Exam     NAD  AAOx3, pleasant, cooperative    Right great toe: Patient has an open wound to the distal aspect of the patient's right great toe.  Wound appears more healthy today.  No worsening today. The base appears healthy. There is callous build up around wound today. This was peeled away.  No signs of infection.    2nd and 3rd toes to left foot: distal tips has pressure injury with callous buildup and hemosiderin deposit.  These have improved today.    Left great toe: Wound to dorsal left great toe has improved. Callous mainly remains.  Surrounding erythema has improved.    See photos for details:    Left toes:            Right great toe:          Result Review :  The following data was reviewed by: Valentina Trotter PA-C on 05/09/2025:    Prior notes and images.         Assessment and Plan   Diagnoses and all orders for this visit:    1. Diabetic ulcer of toe of right foot associated with type 2 diabetes mellitus, with necrosis of muscle (Primary)    2. Diabetic ulcer of toe of left foot associated with type 2 diabetes mellitus, limited to breakdown of skin      His right great toe wound is still slow healing.  He has some pressure injuries to the toes of his left foot as well as a healing wound to his left great toe.  He will continue to apply Dakin's wet-to-dry dressing changes to wounds.  His wife does assist with dressing changes at times.     He could consider being refitted for shoes as I feel that his shoes may be causing the pressure to his toes. We did cut out a hole in his shoe insert previously to offload pressure onto the wound.  We discussed the importance of offloading the wound as much as possible as well as staying off the foot is much as possible. We also  discussed the importance of monitoring his blood glucose levels very closely as well is consuming a healthy diet with plenty of protein to allow for wound healing.  He will follow back up with us in 3 weeks. He will call us with any other worsening symptoms otherwise.    Patient was given instructions and counseling regarding their condition or for health maintenance advice, as well as the wound care plan and recommendations. They understand and agree with the plan.  They will follow back up here in the clinic but are instructed to contact us in the interim should they have any new, returning, or worsening symptoms or concerns. Please see specific information pulled into the AVS if appropriate.     Dragon Dictation utilized for chart completion.    Follow Up   Return in about 3 weeks (around 5/30/2025).      Valentina Trotter PA-C

## 2025-05-21 RX ORDER — GABAPENTIN 300 MG/1
300 CAPSULE ORAL 3 TIMES DAILY
Qty: 270 CAPSULE | Refills: 0 | Status: CANCELLED | OUTPATIENT
Start: 2025-05-21

## 2025-05-21 RX ORDER — DULOXETIN HYDROCHLORIDE 30 MG/1
30 CAPSULE, DELAYED RELEASE ORAL DAILY
Qty: 90 CAPSULE | Refills: 1 | Status: CANCELLED | OUTPATIENT
Start: 2025-05-21

## 2025-05-30 ENCOUNTER — OFFICE VISIT (OUTPATIENT)
Dept: WOUND CARE | Facility: HOSPITAL | Age: 65
End: 2025-05-30
Payer: COMMERCIAL

## 2025-05-30 ENCOUNTER — LAB (OUTPATIENT)
Facility: HOSPITAL | Age: 65
End: 2025-05-30
Payer: COMMERCIAL

## 2025-05-30 VITALS
HEART RATE: 66 BPM | DIASTOLIC BLOOD PRESSURE: 70 MMHG | TEMPERATURE: 96.7 F | SYSTOLIC BLOOD PRESSURE: 120 MMHG | RESPIRATION RATE: 18 BRPM

## 2025-05-30 DIAGNOSIS — E11.621 DIABETIC ULCER OF TOE OF RIGHT FOOT ASSOCIATED WITH TYPE 2 DIABETES MELLITUS, WITH NECROSIS OF MUSCLE: Primary | ICD-10-CM

## 2025-05-30 DIAGNOSIS — L89.896 PRESSURE INJURY OF DEEP TISSUE OF TOE OF LEFT FOOT: ICD-10-CM

## 2025-05-30 DIAGNOSIS — E11.621 DIABETIC ULCER OF TOE OF LEFT FOOT ASSOCIATED WITH TYPE 2 DIABETES MELLITUS, LIMITED TO BREAKDOWN OF SKIN: ICD-10-CM

## 2025-05-30 DIAGNOSIS — L97.521 DIABETIC ULCER OF TOE OF LEFT FOOT ASSOCIATED WITH TYPE 2 DIABETES MELLITUS, LIMITED TO BREAKDOWN OF SKIN: ICD-10-CM

## 2025-05-30 DIAGNOSIS — L97.513 DIABETIC ULCER OF TOE OF RIGHT FOOT ASSOCIATED WITH TYPE 2 DIABETES MELLITUS, WITH NECROSIS OF MUSCLE: Primary | ICD-10-CM

## 2025-05-30 DIAGNOSIS — E11.65 TYPE 2 DIABETES MELLITUS WITH HYPERGLYCEMIA, WITHOUT LONG-TERM CURRENT USE OF INSULIN: ICD-10-CM

## 2025-05-30 LAB
ALBUMIN SERPL-MCNC: 4.3 G/DL (ref 3.5–5.2)
ALBUMIN UR-MCNC: <1.2 MG/DL
ALBUMIN/GLOB SERPL: 1.5 G/DL
ALP SERPL-CCNC: 113 U/L (ref 39–117)
ALT SERPL W P-5'-P-CCNC: 16 U/L (ref 1–41)
ANION GAP SERPL CALCULATED.3IONS-SCNC: 10.2 MMOL/L (ref 5–15)
AST SERPL-CCNC: 21 U/L (ref 1–40)
BASOPHILS # BLD AUTO: 0.06 10*3/MM3 (ref 0–0.2)
BASOPHILS NFR BLD AUTO: 0.9 % (ref 0–1.5)
BILIRUB SERPL-MCNC: 0.8 MG/DL (ref 0–1.2)
BILIRUB UR QL STRIP: NEGATIVE
BUN SERPL-MCNC: 17 MG/DL (ref 8–23)
BUN/CREAT SERPL: 13.5 (ref 7–25)
CALCIUM SPEC-SCNC: 9.4 MG/DL (ref 8.6–10.5)
CHLORIDE SERPL-SCNC: 102 MMOL/L (ref 98–107)
CHOLEST SERPL-MCNC: 155 MG/DL (ref 0–200)
CLARITY UR: CLEAR
CO2 SERPL-SCNC: 26.8 MMOL/L (ref 22–29)
COLOR UR: YELLOW
CREAT SERPL-MCNC: 1.26 MG/DL (ref 0.76–1.27)
CREAT UR-MCNC: 126.8 MG/DL
DEPRECATED RDW RBC AUTO: 43.8 FL (ref 37–54)
EGFRCR SERPLBLD CKD-EPI 2021: 63.7 ML/MIN/1.73
EOSINOPHIL # BLD AUTO: 0.37 10*3/MM3 (ref 0–0.4)
EOSINOPHIL NFR BLD AUTO: 5.5 % (ref 0.3–6.2)
ERYTHROCYTE [DISTWIDTH] IN BLOOD BY AUTOMATED COUNT: 13.1 % (ref 12.3–15.4)
GLOBULIN UR ELPH-MCNC: 2.9 GM/DL
GLUCOSE SERPL-MCNC: 144 MG/DL (ref 65–99)
GLUCOSE UR STRIP-MCNC: NEGATIVE MG/DL
HBA1C MFR BLD: 7.3 % (ref 4.8–5.6)
HCT VFR BLD AUTO: 46.5 % (ref 37.5–51)
HDLC SERPL-MCNC: 43 MG/DL (ref 40–60)
HGB BLD-MCNC: 15.3 G/DL (ref 13–17.7)
HGB UR QL STRIP.AUTO: NEGATIVE
HOLD SPECIMEN: NORMAL
IMM GRANULOCYTES # BLD AUTO: 0.02 10*3/MM3 (ref 0–0.05)
IMM GRANULOCYTES NFR BLD AUTO: 0.3 % (ref 0–0.5)
KETONES UR QL STRIP: ABNORMAL
LDLC SERPL CALC-MCNC: 91 MG/DL (ref 0–100)
LDLC/HDLC SERPL: 2.07 {RATIO}
LEUKOCYTE ESTERASE UR QL STRIP.AUTO: NEGATIVE
LYMPHOCYTES # BLD AUTO: 1.51 10*3/MM3 (ref 0.7–3.1)
LYMPHOCYTES NFR BLD AUTO: 22.6 % (ref 19.6–45.3)
MCH RBC QN AUTO: 30.2 PG (ref 26.6–33)
MCHC RBC AUTO-ENTMCNC: 32.9 G/DL (ref 31.5–35.7)
MCV RBC AUTO: 91.9 FL (ref 79–97)
MICROALBUMIN/CREAT UR: NORMAL MG/G{CREAT}
MONOCYTES # BLD AUTO: 0.77 10*3/MM3 (ref 0.1–0.9)
MONOCYTES NFR BLD AUTO: 11.5 % (ref 5–12)
NEUTROPHILS NFR BLD AUTO: 3.94 10*3/MM3 (ref 1.7–7)
NEUTROPHILS NFR BLD AUTO: 59.2 % (ref 42.7–76)
NITRITE UR QL STRIP: NEGATIVE
NRBC BLD AUTO-RTO: 0 /100 WBC (ref 0–0.2)
PH UR STRIP.AUTO: 6.5 [PH] (ref 5–8)
PLATELET # BLD AUTO: 211 10*3/MM3 (ref 140–450)
PMV BLD AUTO: 9.9 FL (ref 6–12)
POTASSIUM SERPL-SCNC: 4.8 MMOL/L (ref 3.5–5.2)
PROT SERPL-MCNC: 7.2 G/DL (ref 6–8.5)
PROT UR QL STRIP: NEGATIVE
RBC # BLD AUTO: 5.06 10*6/MM3 (ref 4.14–5.8)
SODIUM SERPL-SCNC: 139 MMOL/L (ref 136–145)
SP GR UR STRIP: 1.02 (ref 1–1.03)
TRIGL SERPL-MCNC: 115 MG/DL (ref 0–150)
UROBILINOGEN UR QL STRIP: ABNORMAL
VLDLC SERPL-MCNC: 21 MG/DL (ref 5–40)
WBC NRBC COR # BLD AUTO: 6.67 10*3/MM3 (ref 3.4–10.8)

## 2025-05-30 PROCEDURE — 80053 COMPREHEN METABOLIC PANEL: CPT

## 2025-05-30 PROCEDURE — 82570 ASSAY OF URINE CREATININE: CPT

## 2025-05-30 PROCEDURE — 85025 COMPLETE CBC W/AUTO DIFF WBC: CPT

## 2025-05-30 PROCEDURE — 80061 LIPID PANEL: CPT

## 2025-05-30 PROCEDURE — 83036 HEMOGLOBIN GLYCOSYLATED A1C: CPT

## 2025-05-30 PROCEDURE — 82043 UR ALBUMIN QUANTITATIVE: CPT

## 2025-05-30 PROCEDURE — 81003 URINALYSIS AUTO W/O SCOPE: CPT

## 2025-05-30 PROCEDURE — 36415 COLL VENOUS BLD VENIPUNCTURE: CPT

## 2025-05-30 PROCEDURE — G0463 HOSPITAL OUTPT CLINIC VISIT: HCPCS | Performed by: PHYSICIAN ASSISTANT

## 2025-05-30 NOTE — PROGRESS NOTES
Chief Complaint  Wound Check (Follow up visit for wound to right great toe and left great toe and 3rd toe.  Patient is diabetic.  Patient does not check BS routinely.  Patient has been using betadine to left 3rd toe and left great toe and dakin's to right great toe until he cannot pack it anymore due to size.)    Subjective      History of Present Illness    Idris Park  is a 65 y.o. male here for a wound on his right great toe. It developed several months ago without known injury. He finished 6 weeks of augmentin from our clinic for suspected osteomyelitis. He previously saw [facility].  He is on his feet 8 to 10 hours daily and works as a .  He is a diabetic. He never started the ozympic that was prescribed to him.   He does not monitor his blood glucose levels regularly.  His most recent hemoglobin A1c was 8.1.   We cut out a hole in his shoe insert at previous appointment in order to offload pressure onto the wound.      MRI of right foot completed on 11/7/2024 revealed 1. Susceptibility artifact limiting the exam. There does appear to be some focal edema at the tip of the first distal phalanx with adjacent soft tissue skin ulceration and edema for which osteomyelitis cannot be excluded.     Labs on 11/11/24 revealed an elevated CRP.      He also has callouses to left 2nd and 3rd toes. His left great toe also has a pressure wound. He is currently applying dakins wet to dry dressing to wounds daily.His wounds have improved since last appointment.    Wound culture on 3/28/2025 grew Staph aureus.  Patient was placed on Augmentin and recently finished this.  He notes that this did significantly help all of his wounds.  He did bring a shoe insert today for us to cut a hole to offload his right great toe wound.    Allergies:  Cephalexin      Current Outpatient Medications:     cyclobenzaprine (FLEXERIL) 10 MG tablet, Take 1 tablet by mouth 3 (Three) Times a Day As Needed for Muscle Spasms., Disp: 30  tablet, Rfl: 1    DULoxetine (CYMBALTA) 30 MG capsule, TAKE 1 CAPSULE BY MOUTH DAILY, Disp: 90 capsule, Rfl: 1    etodolac (LODINE) 300 MG capsule, TAKE 1 CAPSULE BY MOUTH TWICE A DAY WITH A MEAL, Disp: 180 capsule, Rfl: 1    gabapentin (NEURONTIN) 300 MG capsule, TAKE 1 CAPSULE BY MOUTH 3 TIMES A DAY, Disp: 270 capsule, Rfl: 0    rosuvastatin (CRESTOR) 5 MG tablet, Take 1 tablet by mouth Daily., Disp: 90 tablet, Rfl: 3    Semaglutide,0.25 or 0.5MG/DOS, (OZEMPIC) 2 MG/3ML solution pen-injector, Inject 0.25 mg under the skin into the appropriate area as directed 1 (One) Time Per Week. (Patient not taking: Reported on 4/30/2025), Disp: 3 mL, Rfl: 0    sodium hypochlorite (DAKIN'S 1/4 STRENGTH) 0.125 % solution topical solution 0.125%, Apply 10 mL topically to the appropriate area as directed 2 (Two) Times a Day., Disp: 1000 mL, Rfl: 1    Past Medical History:   Diagnosis Date    Acromioclavicular separation 2004    Arthritis     COVID-19     Diabetes     Hyperlipidemia     Knee swelling 2000    Limb pain     Limb swelling      Past Surgical History:   Procedure Laterality Date    DENTAL PROCEDURE      EXTERNAL EAR SURGERY Bilateral     HERNIA REPAIR  1967    also 2011    JOINT REPLACEMENT Left     Artificial Joints/Limbs    KNEE ARTHROPLASTY Right     OTHER SURGICAL HISTORY      JOINT SURGERY     Social History     Socioeconomic History    Marital status:    Tobacco Use    Smoking status: Never     Passive exposure: Past    Smokeless tobacco: Never   Vaping Use    Vaping status: Never Used   Substance and Sexual Activity    Alcohol use: Not Currently     Comment: socially-BEER MONTHLY    Drug use: Never    Sexual activity: Yes     Partners: Female     Birth control/protection: None     Objective     Vitals:    05/30/25 0849   BP: 120/70   BP Location: Right arm   Patient Position: Sitting   Cuff Size: Adult   Pulse: 66   Resp: 18  Comment: 94% room air   Temp: 96.7 °F (35.9 °C)   TempSrc: Temporal   PainSc:  0-No pain     Review of Systems     ROS:  Per HPI.     I have reviewed the HPI and ROS as documented by MA/RN. Valentina Trotter PA-C    Physical Exam     NAD  AAOx3, pleasant, cooperative    Right great toe: Patient has an open wound to the distal aspect of the patient's right great toe.  Wound appears more healthy today.  No worsening today. The base appears healthy. There is callous build up around wound today. This was peeled away.  No signs of infection.    2nd and 3rd toes to left foot: distal tips has pressure injury with callous buildup and hemosiderin deposit.  These have improved today.  Callus was peeled away today.    Left great toe: Wound to dorsal left great toe has improved. It appears healthy but still has a small open wound. Surrounding erythema has improved.    See photos for details:    Left toes:                  Right great toe:          Result Review :  The following data was reviewed by: Valentina Trotter PA-C on 05/30/2025:    Prior notes and images.         Assessment and Plan   Diagnoses and all orders for this visit:    1. Diabetic ulcer of toe of right foot associated with type 2 diabetes mellitus, with necrosis of muscle (Primary)    2. Diabetic ulcer of toe of left foot associated with type 2 diabetes mellitus, limited to breakdown of skin    3. Pressure injury of deep tissue of toe of left foot    His right great toe wound is still slow healing.  He has some pressure injuries to the toes of his left foot as well as a healing pressure wound to his left great toe.  All his wounds appear healthy.  Callus was peeled off of the wounds today.  He will continue to apply Dakin's wet-to-dry dressing changes to wounds.  His wife does assist with dressing changes at times. He did bring a shoe insert today for us to cut a hole to offload his right great toe wound.  We did cut out a hole today to offload this wound.  He did recently get new shoes.    We discussed the importance of  offloading the wound as much as possible as well as staying off the foot is much as possible. We also discussed the importance of monitoring his blood glucose levels very closely as well is consuming a healthy diet with plenty of protein to allow for wound healing.  He will follow back up with us in 3 weeks. He will call us with any other worsening symptoms otherwise.    I spent 32 minutes in both face-to-face and nonface-to-face time for patient care today including, but not limited to, review of old records, reviewing old images, history and physical exam, reviewing test results, counseling patient, entering orders, coordinating care, and documenting.     Patient was given instructions and counseling regarding their condition or for health maintenance advice, as well as the wound care plan and recommendations. They understand and agree with the plan.  They will follow back up here in the clinic but are instructed to contact us in the interim should they have any new, returning, or worsening symptoms or concerns. Please see specific information pulled into the AVS if appropriate.     Dragon Dictation utilized for chart completion.    Follow Up   Return in about 3 weeks (around 6/20/2025).      Valentina Trotter PA-C

## 2025-06-02 ENCOUNTER — OFFICE VISIT (OUTPATIENT)
Dept: FAMILY MEDICINE CLINIC | Facility: CLINIC | Age: 65
End: 2025-06-02
Payer: COMMERCIAL

## 2025-06-02 VITALS
TEMPERATURE: 96.9 F | DIASTOLIC BLOOD PRESSURE: 80 MMHG | WEIGHT: 243.6 LBS | BODY MASS INDEX: 36.08 KG/M2 | SYSTOLIC BLOOD PRESSURE: 116 MMHG | RESPIRATION RATE: 16 BRPM | HEART RATE: 72 BPM | OXYGEN SATURATION: 99 % | HEIGHT: 69 IN

## 2025-06-02 DIAGNOSIS — G89.29 CHRONIC LOW BACK PAIN, UNSPECIFIED BACK PAIN LATERALITY, UNSPECIFIED WHETHER SCIATICA PRESENT: ICD-10-CM

## 2025-06-02 DIAGNOSIS — F33.1 MODERATE EPISODE OF RECURRENT MAJOR DEPRESSIVE DISORDER: ICD-10-CM

## 2025-06-02 DIAGNOSIS — E11.65 TYPE 2 DIABETES MELLITUS WITH HYPERGLYCEMIA, WITHOUT LONG-TERM CURRENT USE OF INSULIN: ICD-10-CM

## 2025-06-02 DIAGNOSIS — M54.50 CHRONIC LOW BACK PAIN, UNSPECIFIED BACK PAIN LATERALITY, UNSPECIFIED WHETHER SCIATICA PRESENT: ICD-10-CM

## 2025-06-02 DIAGNOSIS — Z51.81 MEDICATION MONITORING ENCOUNTER: Primary | ICD-10-CM

## 2025-06-02 LAB
AMPHET+METHAMPHET UR QL: NEGATIVE
AMPHETAMINE INTERNAL CONTROL: NORMAL
AMPHETAMINES UR QL: NEGATIVE
BARBITURATE INTERNAL CONTROL: NORMAL
BARBITURATES UR QL SCN: NEGATIVE
BENZODIAZ UR QL SCN: NEGATIVE
BENZODIAZEPINE INTERNAL CONTROL: NORMAL
BUPRENORPHINE INTERNAL CONTROL: NORMAL
BUPRENORPHINE SERPL-MCNC: NEGATIVE NG/ML
CANNABINOIDS SERPL QL: NEGATIVE
COCAINE INTERNAL CONTROL: NORMAL
COCAINE UR QL: NEGATIVE
EXPIRATION DATE: NORMAL
Lab: NORMAL
MDMA (ECSTASY) INTERNAL CONTROL: NORMAL
MDMA UR QL SCN: NEGATIVE
METHADONE INTERNAL CONTROL: NORMAL
METHADONE UR QL SCN: NEGATIVE
METHAMPHETAMINE INTERNAL CONTROL: NORMAL
MORPHINE INTERNAL CONTROL: NORMAL
MORPHINE/OPIATES SCREEN, URINE: NEGATIVE
OXYCODONE INTERNAL CONTROL: NORMAL
OXYCODONE UR QL SCN: NEGATIVE
PCP UR QL SCN: NEGATIVE
PHENCYCLIDINE INTERNAL CONTROL: NORMAL
THC INTERNAL CONTROL: NORMAL

## 2025-06-02 PROCEDURE — 99214 OFFICE O/P EST MOD 30 MIN: CPT | Performed by: NURSE PRACTITIONER

## 2025-06-02 RX ORDER — GABAPENTIN 300 MG/1
300 CAPSULE ORAL 3 TIMES DAILY
Qty: 270 CAPSULE | Refills: 0 | Status: SHIPPED | OUTPATIENT
Start: 2025-06-02

## 2025-06-02 RX ORDER — ROSUVASTATIN CALCIUM 5 MG/1
5 TABLET, COATED ORAL DAILY
Qty: 90 TABLET | Refills: 3 | Status: SHIPPED | OUTPATIENT
Start: 2025-06-02

## 2025-06-02 NOTE — PROGRESS NOTES
Chief Complaint  Peripheral Neuropathy and Hyperlipidemia    Subjective        Idris Park presents to [facility]  History of Present Illness  Peripheral Neuropathy:  gabapentin continues doing well on.    Diabetes:  not on metformin due to diarrhea.  And ozempic not ever gotten filled at pharmacy.  Not sure of issue.  But never notified if approved by insurance.A1C is increasing off of medication.    Hyperlipidemia:  doing well  Peripheral Neuropathy  Pertinent negative symptoms include no chest pain, no cough, no fever, no numbness and no weakness.   Hyperlipidemia  Pertinent negatives include no chest pain or shortness of breath.       The following portions of the patient's history were personally reviewed and updated as appropriate: allergies, current medications, past medical history, past surgical history, past family history, and past social history.     Body mass index is 35.96 kg/m².           Past History:    Medical History: has a past medical history of Acromioclavicular separation (2004), Arthritis, COVID-19, Diabetes, Foot ulcer, Hammer toe, Hyperlipidemia, Knee swelling (2000), Limb pain, Limb swelling, and Neuropathy in diabetes.     Surgical History: has a past surgical history that includes Joint replacement (Left); Hernia repair (1967); Other surgical history; Dental surgery; External ear surgery (Bilateral); and Knee Arthroplasty (Right).     Family History: family history includes Breast cancer (age of onset: 50) in his mother; Cancer in his mother.     Social History: reports that he has never smoked. He has been exposed to tobacco smoke. He has never used smokeless tobacco. He reports that he does not currently use alcohol. He reports that he does not use drugs.    Allergies: Cephalexin          Current Outpatient Medications:     cyclobenzaprine (FLEXERIL) 10 MG tablet, Take 1 tablet by mouth 3 (Three) Times a Day As Needed for Muscle Spasms., Disp: 30  "tablet, Rfl: 1    etodolac (LODINE) 300 MG capsule, TAKE 1 CAPSULE BY MOUTH TWICE A DAY WITH A MEAL, Disp: 180 capsule, Rfl: 1    gabapentin (NEURONTIN) 300 MG capsule, Take 1 capsule by mouth 3 (Three) Times a Day., Disp: 270 capsule, Rfl: 0    rosuvastatin (CRESTOR) 5 MG tablet, Take 1 tablet by mouth Daily., Disp: 90 tablet, Rfl: 3    Semaglutide,0.25 or 0.5MG/DOS, (OZEMPIC) 2 MG/3ML solution pen-injector, Inject 0.25 mg under the skin into the appropriate area as directed 1 (One) Time Per Week., Disp: , Rfl:     sodium hypochlorite (DAKIN'S 1/4 STRENGTH) 0.125 % solution topical solution 0.125%, Apply 10 mL topically to the appropriate area as directed 2 (Two) Times a Day., Disp: 1000 mL, Rfl: 1    Medications Discontinued During This Encounter   Medication Reason    DULoxetine (CYMBALTA) 30 MG capsule *Therapy completed    Semaglutide,0.25 or 0.5MG/DOS, (OZEMPIC) 2 MG/3ML solution pen-injector *Therapy completed    rosuvastatin (CRESTOR) 5 MG tablet Reorder    gabapentin (NEURONTIN) 300 MG capsule Reorder         Review of Systems   Constitutional:  Negative for fever.   Respiratory:  Negative for cough and shortness of breath.    Cardiovascular:  Negative for chest pain, palpitations and leg swelling.   Neurological:  Negative for dizziness, weakness, numbness and headache.        Objective         Vitals:    06/02/25 0725   BP: 116/80   BP Location: Right arm   Patient Position: Sitting   Cuff Size: Adult   Pulse: 72   Resp: 16   Temp: 96.9 °F (36.1 °C)   TempSrc: Temporal   SpO2: 99%   Weight: 110 kg (243 lb 9.6 oz)   Height: 175.3 cm (69.02\")     Body mass index is 35.96 kg/m².         Physical Exam  Vitals reviewed.   Constitutional:       Appearance: Normal appearance. He is well-developed.   HENT:      Head: Normocephalic and atraumatic.      Mouth/Throat:      Pharynx: No oropharyngeal exudate.   Eyes:      Conjunctiva/sclera: Conjunctivae normal.      Pupils: Pupils are equal, round, and reactive to " light.   Cardiovascular:      Rate and Rhythm: Normal rate and regular rhythm.      Heart sounds: Normal heart sounds. No murmur heard.     No friction rub. No gallop.   Pulmonary:      Effort: Pulmonary effort is normal.      Breath sounds: Normal breath sounds. No wheezing or rhonchi.   Skin:     General: Skin is warm and dry.   Neurological:      Mental Status: He is alert and oriented to person, place, and time.   Psychiatric:         Mood and Affect: Mood and affect normal.         Behavior: Behavior normal.         Thought Content: Thought content normal.         Judgment: Judgment normal.             Result Review :               Assessment and Plan     Diagnoses and all orders for this visit:    1. Medication monitoring encounter (Primary)  -     POC Medline 12 Panel Urine Drug Screen    2. Moderate episode of recurrent major depressive disorder    3. Chronic low back pain, unspecified back pain laterality, unspecified whether sciatica present  -     gabapentin (NEURONTIN) 300 MG capsule; Take 1 capsule by mouth 3 (Three) Times a Day.  Dispense: 270 capsule; Refill: 0    4. Type 2 diabetes mellitus with hyperglycemia, without long-term current use of insulin  Comments:  continue ozempic  Orders:  -     rosuvastatin (CRESTOR) 5 MG tablet; Take 1 tablet by mouth Daily.  Dispense: 90 tablet; Refill: 3  -     Semaglutide,0.25 or 0.5MG/DOS, (OZEMPIC) 2 MG/3ML solution pen-injector; Inject 0.25 mg under the skin into the appropriate area as directed 1 (One) Time Per Week.  -     CBC & Differential; Future  -     Comprehensive Metabolic Panel; Future  -     Hemoglobin A1c; Future  -     Urinalysis With Culture If Indicated -; Future  -     Lipid Panel; Future  -     Microalbumin / Creatinine Urine Ratio - Urine, Clean Catch; Future    5. Type 2 diabetes mellitus with hyperglycemia, without long-term current use of insulin  -     rosuvastatin (CRESTOR) 5 MG tablet; Take 1 tablet by mouth Daily.  Dispense: 90 tablet;  Refill: 3  -     Semaglutide,0.25 or 0.5MG/DOS, (OZEMPIC) 2 MG/3ML solution pen-injector; Inject 0.25 mg under the skin into the appropriate area as directed 1 (One) Time Per Week.  -     CBC & Differential; Future  -     Comprehensive Metabolic Panel; Future  -     Hemoglobin A1c; Future  -     Urinalysis With Culture If Indicated -; Future  -     Lipid Panel; Future  -     Microalbumin / Creatinine Urine Ratio - Urine, Clean Catch; Future              Follow Up     Return in about 6 months (around 12/2/2025).    Patient was given instructions and counseling regarding his condition or for health maintenance advice. Please see specific information pulled into the AVS if appropriate.

## 2025-06-03 ENCOUNTER — TELEPHONE (OUTPATIENT)
Dept: PODIATRY | Facility: CLINIC | Age: 65
End: 2025-06-03

## 2025-06-03 NOTE — TELEPHONE ENCOUNTER
Caller: JUDY    Relationship to patient:SELF     Best call back number: [phone]    Type of visit: LEFT FOOT PAIN- SIDE GREAT TOE- BASE FOOT- WOULD LIKE CORTISONE INJECTION- PAIN IS 6/10- FIRST AVAILABLE IS 7/24/25- WOULD LIKE TO BE SEEN SOONER- PLEASE CALL

## 2025-06-27 ENCOUNTER — APPOINTMENT (OUTPATIENT)
Dept: GENERAL RADIOLOGY | Facility: HOSPITAL | Age: 65
End: 2025-06-27
Payer: COMMERCIAL

## 2025-06-27 ENCOUNTER — HOSPITAL ENCOUNTER (EMERGENCY)
Facility: HOSPITAL | Age: 65
Discharge: HOME | End: 2025-06-27
Attending: EMERGENCY MEDICINE
Payer: COMMERCIAL

## 2025-06-27 ENCOUNTER — OFFICE VISIT (OUTPATIENT)
Dept: WOUND CARE | Facility: HOSPITAL | Age: 65
End: 2025-06-27
Payer: COMMERCIAL

## 2025-06-27 VITALS
SYSTOLIC BLOOD PRESSURE: 134 MMHG | RESPIRATION RATE: 18 BRPM | TEMPERATURE: 97.7 F | HEART RATE: 86 BPM | DIASTOLIC BLOOD PRESSURE: 79 MMHG

## 2025-06-27 VITALS
BODY MASS INDEX: 36.54 KG/M2 | HEART RATE: 64 BPM | TEMPERATURE: 98.2 F | HEIGHT: 69 IN | WEIGHT: 246.69 LBS | DIASTOLIC BLOOD PRESSURE: 74 MMHG | RESPIRATION RATE: 18 BRPM | OXYGEN SATURATION: 98 % | SYSTOLIC BLOOD PRESSURE: 118 MMHG

## 2025-06-27 DIAGNOSIS — L08.9 WOUND INFECTION: Primary | ICD-10-CM

## 2025-06-27 DIAGNOSIS — I87.2 VENOUS STASIS DERMATITIS: ICD-10-CM

## 2025-06-27 DIAGNOSIS — L97.513 DIABETIC ULCER OF TOE OF RIGHT FOOT ASSOCIATED WITH TYPE 2 DIABETES MELLITUS, WITH NECROSIS OF MUSCLE: Primary | ICD-10-CM

## 2025-06-27 DIAGNOSIS — L97.521 DIABETIC ULCER OF TOE OF LEFT FOOT ASSOCIATED WITH TYPE 2 DIABETES MELLITUS, LIMITED TO BREAKDOWN OF SKIN: ICD-10-CM

## 2025-06-27 DIAGNOSIS — L03.116 CELLULITIS OF LEFT FOOT: ICD-10-CM

## 2025-06-27 DIAGNOSIS — T14.8XXA WOUND INFECTION: Primary | ICD-10-CM

## 2025-06-27 DIAGNOSIS — E11.621 DIABETIC ULCER OF TOE OF LEFT FOOT ASSOCIATED WITH TYPE 2 DIABETES MELLITUS, LIMITED TO BREAKDOWN OF SKIN: ICD-10-CM

## 2025-06-27 DIAGNOSIS — L89.896 PRESSURE INJURY OF DEEP TISSUE OF TOE OF LEFT FOOT: ICD-10-CM

## 2025-06-27 DIAGNOSIS — T14.8XXA WOUND INFECTION: ICD-10-CM

## 2025-06-27 DIAGNOSIS — E11.621 DIABETIC ULCER OF TOE OF RIGHT FOOT ASSOCIATED WITH TYPE 2 DIABETES MELLITUS, WITH NECROSIS OF MUSCLE: Primary | ICD-10-CM

## 2025-06-27 DIAGNOSIS — L08.9 WOUND INFECTION: ICD-10-CM

## 2025-06-27 LAB
ALBUMIN SERPL-MCNC: 4.1 G/DL (ref 3.5–5.2)
ALBUMIN/GLOB SERPL: 1.3 G/DL
ALP SERPL-CCNC: 103 U/L (ref 39–117)
ALT SERPL W P-5'-P-CCNC: 10 U/L (ref 1–41)
ANION GAP SERPL CALCULATED.3IONS-SCNC: 11.2 MMOL/L (ref 5–15)
AST SERPL-CCNC: 14 U/L (ref 1–40)
BASOPHILS # BLD AUTO: 0.07 10*3/MM3 (ref 0–0.2)
BASOPHILS NFR BLD AUTO: 0.5 % (ref 0–1.5)
BILIRUB SERPL-MCNC: 1.1 MG/DL (ref 0–1.2)
BUN SERPL-MCNC: 12.5 MG/DL (ref 8–23)
BUN/CREAT SERPL: 11.3 (ref 7–25)
CALCIUM SPEC-SCNC: 8.6 MG/DL (ref 8.6–10.5)
CHLORIDE SERPL-SCNC: 97 MMOL/L (ref 98–107)
CO2 SERPL-SCNC: 23.8 MMOL/L (ref 22–29)
CREAT SERPL-MCNC: 1.11 MG/DL (ref 0.76–1.27)
DEPRECATED RDW RBC AUTO: 43.8 FL (ref 37–54)
EGFRCR SERPLBLD CKD-EPI 2021: 74.2 ML/MIN/1.73
EOSINOPHIL # BLD AUTO: 0.21 10*3/MM3 (ref 0–0.4)
EOSINOPHIL NFR BLD AUTO: 1.5 % (ref 0.3–6.2)
ERYTHROCYTE [DISTWIDTH] IN BLOOD BY AUTOMATED COUNT: 13.2 % (ref 12.3–15.4)
GLOBULIN UR ELPH-MCNC: 3.2 GM/DL
GLUCOSE SERPL-MCNC: 158 MG/DL (ref 65–99)
HCT VFR BLD AUTO: 41.3 % (ref 37.5–51)
HGB BLD-MCNC: 13.6 G/DL (ref 13–17.7)
HOLD SPECIMEN: NORMAL
IMM GRANULOCYTES # BLD AUTO: 0.05 10*3/MM3 (ref 0–0.05)
IMM GRANULOCYTES NFR BLD AUTO: 0.4 % (ref 0–0.5)
LYMPHOCYTES # BLD AUTO: 1.53 10*3/MM3 (ref 0.7–3.1)
LYMPHOCYTES NFR BLD AUTO: 10.9 % (ref 19.6–45.3)
MCH RBC QN AUTO: 29.9 PG (ref 26.6–33)
MCHC RBC AUTO-ENTMCNC: 32.9 G/DL (ref 31.5–35.7)
MCV RBC AUTO: 90.8 FL (ref 79–97)
MONOCYTES # BLD AUTO: 1.67 10*3/MM3 (ref 0.1–0.9)
MONOCYTES NFR BLD AUTO: 11.9 % (ref 5–12)
NEUTROPHILS NFR BLD AUTO: 10.56 10*3/MM3 (ref 1.7–7)
NEUTROPHILS NFR BLD AUTO: 74.8 % (ref 42.7–76)
NRBC BLD AUTO-RTO: 0 /100 WBC (ref 0–0.2)
PLATELET # BLD AUTO: 214 10*3/MM3 (ref 140–450)
PMV BLD AUTO: 9.5 FL (ref 6–12)
POTASSIUM SERPL-SCNC: 4.1 MMOL/L (ref 3.5–5.2)
PROT SERPL-MCNC: 7.3 G/DL (ref 6–8.5)
RBC # BLD AUTO: 4.55 10*6/MM3 (ref 4.14–5.8)
SODIUM SERPL-SCNC: 132 MMOL/L (ref 136–145)
WBC NRBC COR # BLD AUTO: 14.09 10*3/MM3 (ref 3.4–10.8)
WHOLE BLOOD HOLD COAG: NORMAL

## 2025-06-27 PROCEDURE — 80053 COMPREHEN METABOLIC PANEL: CPT | Performed by: EMERGENCY MEDICINE

## 2025-06-27 PROCEDURE — 85025 COMPLETE CBC W/AUTO DIFF WBC: CPT | Performed by: EMERGENCY MEDICINE

## 2025-06-27 PROCEDURE — 99283 EMERGENCY DEPT VISIT LOW MDM: CPT

## 2025-06-27 PROCEDURE — 25010000002 AMPICILLIN-SULBACTAM PER 1.5 G

## 2025-06-27 PROCEDURE — 87040 BLOOD CULTURE FOR BACTERIA: CPT | Performed by: EMERGENCY MEDICINE

## 2025-06-27 PROCEDURE — G0463 HOSPITAL OUTPT CLINIC VISIT: HCPCS | Performed by: PHYSICIAN ASSISTANT

## 2025-06-27 PROCEDURE — 73630 X-RAY EXAM OF FOOT: CPT

## 2025-06-27 PROCEDURE — 87186 SC STD MICRODIL/AGAR DIL: CPT | Performed by: EMERGENCY MEDICINE

## 2025-06-27 PROCEDURE — 87147 CULTURE TYPE IMMUNOLOGIC: CPT | Performed by: EMERGENCY MEDICINE

## 2025-06-27 PROCEDURE — 36415 COLL VENOUS BLD VENIPUNCTURE: CPT | Performed by: EMERGENCY MEDICINE

## 2025-06-27 PROCEDURE — 87205 SMEAR GRAM STAIN: CPT | Performed by: EMERGENCY MEDICINE

## 2025-06-27 PROCEDURE — 96365 THER/PROPH/DIAG IV INF INIT: CPT

## 2025-06-27 PROCEDURE — 87070 CULTURE OTHR SPECIMN AEROBIC: CPT | Performed by: EMERGENCY MEDICINE

## 2025-06-27 PROCEDURE — 36415 COLL VENOUS BLD VENIPUNCTURE: CPT

## 2025-06-27 RX ORDER — SULFAMETHOXAZOLE AND TRIMETHOPRIM 800; 160 MG/1; MG/1
1 TABLET ORAL 2 TIMES DAILY
Qty: 20 TABLET | Refills: 0 | Status: SHIPPED | OUTPATIENT
Start: 2025-06-27 | End: 2025-07-07

## 2025-06-27 RX ADMIN — AMPICILLIN AND SULBACTAM 3 G: 2; 1 INJECTION, POWDER, FOR SOLUTION INTRAVENOUS at 16:19

## 2025-06-27 NOTE — ED PROVIDER NOTES
"SHARED VISIT ATTESTATION:    This visit was performed by myself and an APC.  I personally approved the management plan/medical decision making and take responsibility for the patient management.      SHARED VISIT NOTE:    Patient is 65 y.o. year old male that presents to the ED for evaluation of left first toe wound.     Physical Exam    ED Course:    /74 (BP Location: Right arm, Patient Position: Lying)   Pulse 64   Temp 98.2 °F (36.8 °C) (Oral)   Resp 18   Ht 175.3 cm (69\")   Wt 112 kg (246 lb 11.1 oz)   SpO2 98%   BMI 36.43 kg/m²       The following orders were placed and all results were independently analyzed by me:  Orders Placed This Encounter   Procedures    Amanda Mann DME 12. Post Op Shoe (); Left    Wound Culture - Swab, Toe, Left    Blood Culture - Blood,    Blood Culture - Blood,    XR Foot 3+ View Left    Comprehensive Metabolic Panel    CBC Auto Differential    Ambulatory Referral to Podiatry    Please place a wet-to-dry Betadine dressing to wound  Misc Nursing Order (Specify)    Obtain & Apply The Following- Lower extremity; Post-op shoe    CBC & Differential    Extra Tubes    Gold Top - SST    Light Blue Top       Medications Given in the Emergency Department:  Medications   ampicillin-sulbactam (UNASYN) 3 g in sodium chloride 0.9 % 100 mL IVPB-VTB (0 g Intravenous Stopped 6/27/25 1655)        ED Course:    ED Course as of 06/30/25 1927 Fri Jun 27, 2025   1449 Consulting podiatry at this time [MS]   1505 I have updated patient with results and made him aware that [MS]   1531 I have spoke with the doctors, Dr. Moon. - he request that pt receive a dose of IV antibiotics were here in the emergency department and did not discharge on oral antibiotic therapy.  He also request that patient have Betadine dressings to the wound daily and given a surgical shoe.  He will see him in his office on Monday or Tuesday.  He does advised to give the patient strict return instructions to the " emergency department if he gets worse over the weekend.    I have discussed patient's previous wound culture results with the ED pharmacist for IV antibiotic therapy as well as at home antibiotics.  She suggest giving 3 g of Unasyn while here in the department and then discharging on Bactrim [MS]      ED Course User Index  [MS] Carolynn Chance, LORENA       Labs:    Lab Results (last 24 hours)       ** No results found for the last 24 hours. **             Imaging:    No Radiology Exams Resulted Within Past 24 Hours      MDM:    Patient is not febrile or tachycardic.  Well-appearing.  Podiatry consulted and comfortable with discharge and close outpatient follow-up.  Antibiotics prescribed per their recommendations.        Procedures    Labs were collected in the emergency department and all labs were reviewed and interpreted by me.  X-ray were performed in the emergency department and all X-ray impressions were independently interpreted by me.                     Marcus Clark MD  19:27 EDT  06/30/25         Marcus Clark MD  06/30/25 1927

## 2025-06-27 NOTE — DISCHARGE INSTRUCTIONS
Dr. Moon would like to see you in his office on Monday or Tuesday.  Please call his office first thing Monday morning to see what time they would like for you to come to the office.  I have provided you with their contact information.  Until then please keep a Betadine dressing on the wound at all times.  Use the postop shoe and avoid wearing regular shoes until you are seen by the podiatrist.  I have prescribed you an antibiotic please be sure to start it and take all of it unless the podiatrist tells you different.  He did want me to stress the importance of returning to the emergency department if you develop any pain, fever, severe nausea, vomiting, diarrhea or any other concerns surrounding your wound.

## 2025-06-27 NOTE — PROGRESS NOTES
Chief Complaint  Wound Check (Pt here for bilateral feet wound check.  He does betadine and gauze changed daily.  He hit his left great toe on his door and now is swollen and red. Pain 7/10. )    Subjective      History of Present Illness    Idris Park  is a 65 y.o. male here for a chronic wound on his right great toe. It developed several months ago without known injury. He finished 6 weeks of augmentin from our clinic for suspected osteomyelitis. He previously saw [facility].  He is on his feet 8 to 10 hours daily and works as a . He notes that he is not really able to say off feet as he has to work. He is a diabetic. He never started the ozympic that was prescribed to him.   He does not monitor his blood glucose levels regularly.  His most recent hemoglobin A1c was 8.1.   We cut out a hole in his shoe insert at previous appointment in order to offload pressure onto the wound.      MRI of right foot completed on 11/7/2024 revealed 1. Susceptibility artifact limiting the exam. There does appear to be some focal edema at the tip of the first distal phalanx with adjacent soft tissue skin ulceration and edema for which osteomyelitis cannot be excluded.     His left great toe also has a pressure wound.  Today this wound is severely worse. The patient notes that he woke up and the toe was severely worse. His toe is swollen and red and is weeping. He denies fever. He has failed several rounds of outpatient antibiotics. He also has callouses to left 2nd and 3rd toes.He is currently applying betadine and dry dressing to wounds daily.    Wound culture on 3/28/2025 grew Staph aureus.  Patient was placed on Augmentin and recently finished this.      Allergies:  Cephalexin    No current facility-administered medications for this visit.  No current outpatient medications on file.    Facility-Administered Medications Ordered in Other Visits:     acetaminophen (TYLENOL) tablet 650 mg, 650 mg, Oral, Q6H PRN, Scott  Suman BOATENG MD    ampicillin-sulbactam (UNASYN) 3 g in sodium chloride 0.9 % 100 mL IVPB-VTB, 3 g, Intravenous, Q6H, Suman Chavez MD, 3 g at 07/08/25 1418    sennosides-docusate (PERICOLACE) 8.6-50 MG per tablet 2 tablet, 2 tablet, Oral, BID PRN **AND** polyethylene glycol (MIRALAX) packet 17 g, 17 g, Oral, Daily PRN **AND** bisacodyl (DULCOLAX) EC tablet 5 mg, 5 mg, Oral, Daily PRN **AND** bisacodyl (DULCOLAX) suppository 10 mg, 10 mg, Rectal, Daily PRN, Suman Chavez MD    dextrose (D50W) (25 g/50 mL) IV injection 25 g, 25 g, Intravenous, Q15 Min PRN, Suman Chavez MD    dextrose (GLUTOSE) oral gel 15 g, 15 g, Oral, Q15 Min PRN, Suman Chavez MD    glucagon (GLUCAGEN) injection 1 mg, 1 mg, Intramuscular, Q15 Min PRN, Suman Chavez MD    heparin (porcine) 5000 UNIT/ML injection 5,000 Units, 5,000 Units, Subcutaneous, Q8H, Suman Chavez MD    Insulin Lispro (humaLOG) injection 2-7 Units, 2-7 Units, Subcutaneous, 4x Daily AC & at Bedtime, Sumna Chavez MD    ketorolac (TORADOL) injection 15 mg, 15 mg, Intravenous, Q6H PRN, Suman Chavez MD    sodium chloride 0.9 % bolus 1,000 mL, 1,000 mL, Intravenous, Once, Suman Chavez MD    sodium chloride 0.9 % flush 10 mL, 10 mL, Intravenous, Q12H, Suman Chavez MD, 10 mL at 07/08/25 1418    sodium chloride 0.9 % flush 10 mL, 10 mL, Intravenous, PRN, Suman Chavez MD    sodium chloride 0.9 % infusion 40 mL, 40 mL, Intravenous, PRN, Suman Chavez MD    Past Medical History:   Diagnosis Date    Acromioclavicular separation 2004    Arthritis     COVID-19     Diabetes     Foot ulcer     Hammer toe     Hyperlipidemia     Knee swelling 2000    Limb pain     Limb swelling     Neuropathy in diabetes      Past Surgical History:   Procedure Laterality Date    DENTAL PROCEDURE      EXTERNAL EAR SURGERY Bilateral     HERNIA REPAIR  1967    also 2011    JOINT REPLACEMENT Left     Artificial Joints/Limbs    KNEE  ARTHROPLASTY Right     OTHER SURGICAL HISTORY      JOINT SURGERY     Social History     Socioeconomic History    Marital status:    Tobacco Use    Smoking status: Never     Passive exposure: Past    Smokeless tobacco: Never   Vaping Use    Vaping status: Never Used   Substance and Sexual Activity    Alcohol use: Not Currently     Comment: socially-BEER MONTHLY    Drug use: Never    Sexual activity: Yes     Partners: Female     Birth control/protection: None     Objective     Vitals:    06/27/25 0829   BP: 134/79   Pulse: 86   Resp: 18  Comment: 94% RA   Temp: 97.7 °F (36.5 °C)   TempSrc: Temporal   PainSc: 7    PainLoc: Foot     Review of Systems     ROS:  Per HPI.     I have reviewed the HPI and ROS as documented by MA/RN. Valentina Trotter PA-C    Physical Exam     NAD  AAOx3, pleasant, cooperative    Left great toe: Patient has a much worsened open wound to the distal aspect of the patient's dorsal left great toe.  Wound appears severely worse today with significant swelling, redness and weeping. The toe is like 3x its usual size.  There is some tenderness but patient has neuropathy.     2nd and 3rd toes to left foot: distal tips has pressure injury with callous buildup and hemosiderin deposit.  These have improved today.  Callus was peeled away today.    Right great toe: Wound to planter right great toe has improved. It appears healthy but still has a small open wound.  Callous peeled away today.     See photos for details:    Left toes:          Right great toe:        Result Review :  The following data was reviewed by: Valentina Trotter PA-C on 06/27/2025:    Prior notes and images.         Assessment and Plan   Diagnoses and all orders for this visit:    1. Diabetic ulcer of toe of right foot associated with type 2 diabetes mellitus, with necrosis of muscle (Primary)    2. Diabetic ulcer of toe of left foot associated with type 2 diabetes mellitus, limited to breakdown of skin    3. Wound  infection    4. Cellulitis of left foot    5. Pressure injury of deep tissue of toe of left foot    6. Venous stasis dermatitis    He has a severe infection of his left great toe. We advised patient to go the emergency department for worsening left great toe wound and significant cellulitis to his left great toe. We suggest Labwork, IV antibiotics, Left foot MRI, admission, and podiatry evaluation. Discussed plan with patient and he agrees to go later after going home to change and picking up his wife. We discussed chance of amputation of toe.     His right great toe wound is still slow healing.  He has some pressure injuries to the 2nd/3rd toes of his left foot.  Callus was peeled off of the wounds today.  He will continue to apply betadine dressing changes to wounds daily.  His wife does assist with dressing changes at times. We cut a hole in shoe insert recently to offload his right great toe wound. He did recently get new shoes.    We discussed the importance of offloading the wound as much as possible as well as staying off the foot is much as possible. We also discussed the importance of monitoring his blood glucose levels very closely as well is consuming a healthy diet with plenty of protein to allow for wound healing.  He will follow back up with us in 3 weeks. He will call us with any other worsening symptoms otherwise.    I spent 41 minutes in both face-to-face and nonface-to-face time for patient care today including, but not limited to, review of old records, reviewing old images, history and physical exam, reviewing test results, counseling patient, entering orders, coordinating care, and documenting.     Patient was given instructions and counseling regarding their condition or for health maintenance advice, as well as the wound care plan and recommendations. They understand and agree with the plan.  They will follow back up here in the clinic but are instructed to contact us in the interim should  they have any new, returning, or worsening symptoms or concerns. Please see specific information pulled into the AVS if appropriate.     Dragon Dictation utilized for chart completion.    Follow Up   Return in about 2 weeks (around 7/11/2025) for Recheck.      Valentina Trotter PA-C

## 2025-06-27 NOTE — ED PROVIDER NOTES
Time: 12:10 PM EDT  Date of encounter:  6/27/2025  Room number: 22/22  Independent Historian/Clinical History and Information was obtained by:   Patient    History is limited by: N/A    Chief Complaint: wound check       History of Present Illness:  Patient is a 65 y.o. year old male who presents to the emergency department for evaluation of diabetic foot wound. Pt was at the wound clinic and sent to ER for additional assessment and workup of left foot wound. PT reports developing a new wound to left great toe within 24-48 hours. He has recently started wearing a new pair of steel toe boots at work but is unsure if that is what initiated the skin breakdown and formation of wound. He denies pain but has hx neuropathy. Wound to described to be weeping. PT has had no fevers, N/V/D. Last course of ABX therapy for a previous wound was in April HPI    Patient Care Team  Primary Care Provider: Dae Yan APRN    Past Medical History:     Allergies   Allergen Reactions    Cephalexin Rash     Past Medical History:   Diagnosis Date    Acromioclavicular separation 2004    Arthritis     COVID-19     Diabetes     Foot ulcer     Hammer toe     Hyperlipidemia     Knee swelling 2000    Limb pain     Limb swelling     Neuropathy in diabetes      Past Surgical History:   Procedure Laterality Date    DENTAL PROCEDURE      EXTERNAL EAR SURGERY Bilateral     HERNIA REPAIR  1967    also 2011    JOINT REPLACEMENT Left     Artificial Joints/Limbs    KNEE ARTHROPLASTY Right     OTHER SURGICAL HISTORY      JOINT SURGERY     Family History   Problem Relation Age of Onset    Cancer Mother     Breast cancer Mother 50       Home Medications:  Prior to Admission medications    Medication Sig Start Date End Date Taking? Authorizing Provider   cyclobenzaprine (FLEXERIL) 10 MG tablet Take 1 tablet by mouth 3 (Three) Times a Day As Needed for Muscle Spasms. 12/10/21   Dae Yan APRN   etodolac (LODINE) 300 MG capsule TAKE 1 CAPSULE BY  "MOUTH TWICE A DAY WITH A MEAL 4/21/25   Dae Yan APRN   gabapentin (NEURONTIN) 300 MG capsule Take 1 capsule by mouth 3 (Three) Times a Day. 6/2/25   Dae Yan APRN   rosuvastatin (CRESTOR) 5 MG tablet Take 1 tablet by mouth Daily. 6/2/25   Dae Yan APRN   Semaglutide,0.25 or 0.5MG/DOS, (OZEMPIC) 2 MG/3ML solution pen-injector Inject 0.25 mg under the skin into the appropriate area as directed 1 (One) Time Per Week. 6/2/25   Dae Yan APRN   sodium hypochlorite (DAKIN'S 1/4 STRENGTH) 0.125 % solution topical solution 0.125% Apply 10 mL topically to the appropriate area as directed 2 (Two) Times a Day. 3/7/25   Valentina Trotter PA-C        Social History:   Social History     Tobacco Use    Smoking status: Never     Passive exposure: Past    Smokeless tobacco: Never   Vaping Use    Vaping status: Never Used   Substance Use Topics    Alcohol use: Not Currently     Comment: socially-BEER MONTHLY    Drug use: Never         Review of Systems:  Review of Systems     I performed a review of systems today, which was all negative, except for the positives found in the HPI above.      Physical Exam:  /74 (BP Location: Right arm, Patient Position: Lying)   Pulse 64   Temp 98.2 °F (36.8 °C) (Oral)   Resp 18   Ht 175.3 cm (69\")   Wt 112 kg (246 lb 11.1 oz)   SpO2 98%   BMI 36.43 kg/m²     Physical Exam   General:  Awake alert no apparent distress    Head: Normocephalic, atraumatic, eyes PERRLA EOMI, nose normal, oropharynx normal    Neck: Supple, no meningismus, no cervical lymphadenopathy or JVD    Heart: Regular rate and rhythm, no murmurs or rubs, 2+ radial pulses    Lungs: Clear to auscultation bilaterally, no wheezing or rhonchi or rales, no respiratory distress    Abdomen: Soft, nontender, nondistended, no signs of peritonitis    Skin: Warm, dry, no rash    Musculoskeletal: Normal range of motion, no obvious deformities, no edema noted with the exception of the left foot. There " is a nickel sized round wound to top medial aspect of left great toe. Significant swelling is present and wound is weeping. Warm and redness is also present and radiates up over top of foot and to sole of foot.     Neurologic: Awake, alert, oriented x 3, no motor or sensory deficits appreciated    Psych: No suicidal or homicidal ideations, no psychosis                  Procedures:  Procedures      Medical Decision Making:      Comorbidities that affect care:    Diabetes    External Notes reviewed:    Previous Clinic Note: I reviewed encounter at wound clinic that occurred just prior to ER      The following orders were placed and all results were independently analyzed by me:  Orders Placed This Encounter   Procedures    [facility] 12. Post Op Shoe (); Left    Wound Culture - Swab, Toe, Left    Blood Culture - Blood,    Blood Culture - Blood,    XR Foot 3+ View Left    Comprehensive Metabolic Panel    CBC Auto Differential    Ambulatory Referral to Podiatry    Please place a wet-to-dry Betadine dressing to wound  Misc Nursing Order (Specify)    Obtain & Apply The Following- Lower extremity; Post-op shoe    CBC & Differential    Extra Tubes    Gold Top - SST    Light Blue Top       Medications Given in the Emergency Department:  Medications   ampicillin-sulbactam (UNASYN) 3 g in sodium chloride 0.9 % 100 mL IVPB-VTB (0 g Intravenous Stopped 6/27/25 1655)        ED Course:    ED Course as of 06/28/25 1310   Fri Jun 27, 2025   1449 Consulting podiatry at this time [MS]   1505 I have updated patient with results and made him aware that [MS]   1531 I have spoke with the doctors, Dr. Moon. - he request that pt receive a dose of IV antibiotics were here in the emergency department and did not discharge on oral antibiotic therapy.  He also request that patient have Betadine dressings to the wound daily and given a surgical shoe.  He will see him in his office on Monday or Tuesday.  He does advised to give  the patient strict return instructions to the emergency department if he gets worse over the weekend.    I have discussed patient's previous wound culture results with the ED pharmacist for IV antibiotic therapy as well as at home antibiotics.  She suggest giving 3 g of Unasyn while here in the department and then discharging on Bactrim [MS]      ED Course User Index  [MS] Carolynn Chance, APRN       Labs:    Lab Results (last 24 hours)       Procedure Component Value Units Date/Time    Blood Culture - Blood, Arm, Left [[id number]] Collected: 06/27/25 1440    Specimen: Blood from Arm, Left Updated: 06/27/25 1443    Blood Culture - Blood, Arm, Right [[id number]] Collected: 06/27/25 1440    Specimen: Blood from Arm, Right Updated: 06/27/25 1443             Imaging:    No Radiology Exams Resulted Within Past 24 Hours        Differential Diagnosis and Discussion:    Wound Evaluation: Differential diagnosis includes but is not limited to laceration, abrasion, puncture, burn, ulcer, cellulitis, abscess, vasculitis, malignancy, and rash.    Labs were collected in the emergency department and all labs were reviewed and interpreted by me.  X-ray were performed in the emergency department and all X-ray impressions were independently interpreted by me.    MDM                 Patient Care Considerations:    SEPSIS was considered but is NOT present in the emergency department as SIRS criteria is not present.      Consultants/Shared Management Plan:    I discussed this pt with ED attending Dr. Clark as well as with on call podiatrist Dr. Moon. Plan of care and final disposition, as well as at home medications were discussed. I also consulted with ED PharmCOURTNEY Miller regarding ABX therapy    Social Determinants of Health:          Disposition and Care Coordination:    Discharged: I considered escalation of care by admitting this patient to the hospital, however after consulting with podiatry it was agreed upon by all  parties that IV ABX in ER, as well as at home betadine dressing and continuation of ABX he was safe for d/c and could f/u in clinic on Monday or Tuesday.         Final diagnoses:   Wound infection        ED Disposition       ED Disposition   Discharge    Condition   Stable    Comment   --               This medical record created using voice recognition software.       Carolynn Chance, APRN  06/28/25 2407

## 2025-06-30 ENCOUNTER — HOSPITAL ENCOUNTER (OUTPATIENT)
Dept: MRI IMAGING | Facility: HOSPITAL | Age: 65
Discharge: HOME | End: 2025-06-30
Admitting: PODIATRIST
Payer: COMMERCIAL

## 2025-06-30 ENCOUNTER — OFFICE VISIT (OUTPATIENT)
Dept: PODIATRY | Facility: CLINIC | Age: 65
End: 2025-06-30
Payer: COMMERCIAL

## 2025-06-30 VITALS
RESPIRATION RATE: 18 BRPM | SYSTOLIC BLOOD PRESSURE: 132 MMHG | BODY MASS INDEX: 35.7 KG/M2 | WEIGHT: 241 LBS | DIASTOLIC BLOOD PRESSURE: 78 MMHG | OXYGEN SATURATION: 95 % | TEMPERATURE: 97.9 F | HEIGHT: 69 IN | HEART RATE: 59 BPM

## 2025-06-30 DIAGNOSIS — M86.172 OSTEOMYELITIS OF ANKLE OR FOOT, ACUTE, LEFT: ICD-10-CM

## 2025-06-30 DIAGNOSIS — L03.032 CELLULITIS OF TOE OF LEFT FOOT: ICD-10-CM

## 2025-06-30 DIAGNOSIS — G62.9 NEUROPATHY: ICD-10-CM

## 2025-06-30 DIAGNOSIS — L89.893 PRESSURE INJURY OF LEFT FOOT, STAGE 3: ICD-10-CM

## 2025-06-30 DIAGNOSIS — E11.42 TYPE 2 DIABETES MELLITUS WITH POLYNEUROPATHY: Primary | ICD-10-CM

## 2025-06-30 LAB
BACTERIA SPEC AEROBE CULT: ABNORMAL
BACTERIA SPEC AEROBE CULT: ABNORMAL
GRAM STN SPEC: ABNORMAL
GRAM STN SPEC: ABNORMAL

## 2025-06-30 PROCEDURE — 73720 MRI LWR EXTREMITY W/O&W/DYE: CPT

## 2025-06-30 PROCEDURE — 25510000001 GADOPICLENOL 0.5 MMOL/ML SOLUTION: Performed by: PODIATRIST

## 2025-06-30 PROCEDURE — A9573 GADOPICLENOL 0.5 MMOL/ML SOLUTION: HCPCS | Performed by: PODIATRIST

## 2025-06-30 RX ADMIN — GADOPICLENOL 10 ML: 485.1 INJECTION INTRAVENOUS at 19:45

## 2025-06-30 NOTE — PROGRESS NOTES
[facility] - PODIATRY    Today's Date: 06/30/25    Patient Name: Idris Park  MRN: [id number]  CSN: [id number]  PCP: Dae Yan APRN  Referring Provider: Carolynn Chance, *    Patient or patient representative verbalized consent for the use of Ambient Listening during the visit with  Ervin Whitehead DPM for chart documentation. 6/30/2025  09:37 EDT    SUBJECTIVE     Chief Complaint   Patient presents with    Left Foot - Foot Ulcer     HPI: Idris Park, a 65 y.o.male, presents to clinic.    History of Present Illness  The patient presents for a left great toe ulcer.    He recently visited the emergency room due to an ulcer on his left great toe. The issue began on Thursday morning when he noticed the ulcer after removing his shoes and socks post-work. He had a pre-scheduled appointment with wound care for the following day, so he wrapped the ulcer and proceeded to work. Upon visiting wound care, they removed the bandage and advised him to seek immediate medical attention at the ER. At the ER, he was prescribed antibiotics and instructed to apply Betadine to the ulcer. An x-ray and culture were also performed. He started the antibiotic regimen on Saturday. Despite these interventions, he reports that the ulcer has been oozing excessively and has split due to swelling. He has no history of injury or trauma to the foot. He has neuropathy but is able to feel when he steps on something. He has no metallic implants in his body, including his eyes. He recalls a previous incident where a culture was taken from the same site, and he was initially prescribed an antibiotic. However, upon receiving the culture results, it was found that the bacteria were resistant to the initial antibiotic, necessitating a change in medication.    MEDICATIONS  Bactrim    Patient denies any fevers, chills, nausea, vomiting, shortness of breath, nor any other constitutional signs nor symptoms.    No other pedal  complaints at this time.    Past Medical History:   Diagnosis Date    Acromioclavicular separation 2004    Arthritis     COVID-19     Diabetes     Foot ulcer     Hammer toe     Hyperlipidemia     Knee swelling 2000    Limb pain     Limb swelling     Neuropathy in diabetes      Past Surgical History:   Procedure Laterality Date    DENTAL PROCEDURE      EXTERNAL EAR SURGERY Bilateral     HERNIA REPAIR  1967    also 2011    JOINT REPLACEMENT Left     Artificial Joints/Limbs    KNEE ARTHROPLASTY Right     OTHER SURGICAL HISTORY      JOINT SURGERY     Family History   Problem Relation Age of Onset    Cancer Mother     Breast cancer Mother 50     Social History     Socioeconomic History    Marital status:    Tobacco Use    Smoking status: Never     Passive exposure: Past    Smokeless tobacco: Never   Vaping Use    Vaping status: Never Used   Substance and Sexual Activity    Alcohol use: Not Currently     Comment: socially-BEER MONTHLY    Drug use: Never    Sexual activity: Yes     Partners: Female     Birth control/protection: None     Allergies   Allergen Reactions    Cephalexin Rash     Current Outpatient Medications   Medication Sig Dispense Refill    etodolac (LODINE) 300 MG capsule TAKE 1 CAPSULE BY MOUTH TWICE A DAY WITH A MEAL 180 capsule 1    gabapentin (NEURONTIN) 300 MG capsule Take 1 capsule by mouth 3 (Three) Times a Day. 270 capsule 0    rosuvastatin (CRESTOR) 5 MG tablet Take 1 tablet by mouth Daily. 90 tablet 3    cyclobenzaprine (FLEXERIL) 10 MG tablet Take 1 tablet by mouth 3 (Three) Times a Day As Needed for Muscle Spasms. (Patient not taking: Reported on 6/30/2025) 30 tablet 1    sulfamethoxazole-trimethoprim (BACTRIM DS,SEPTRA DS) 800-160 MG per tablet Take 1 tablet by mouth 2 (Two) Times a Day for 10 days. (Patient not taking: Reported on 6/30/2025) 20 tablet 0     No current facility-administered medications for this visit.       OBJECTIVE     Vitals:    06/30/25 0912   BP: 132/78   Pulse: 59    Resp: 18   Temp: 97.9 °F (36.6 °C)   SpO2: 95%       WBC   Date Value Ref Range Status   06/27/2025 14.09 (H) 3.40 - 10.80 10*3/mm3 Final     RBC   Date Value Ref Range Status   06/27/2025 4.55 4.14 - 5.80 10*6/mm3 Final     Hemoglobin   Date Value Ref Range Status   06/27/2025 13.6 13.0 - 17.7 g/dL Final     Hematocrit   Date Value Ref Range Status   06/27/2025 41.3 37.5 - 51.0 % Final     MCV   Date Value Ref Range Status   06/27/2025 90.8 79.0 - 97.0 fL Final     MCH   Date Value Ref Range Status   06/27/2025 29.9 26.6 - 33.0 pg Final     MCHC   Date Value Ref Range Status   06/27/2025 32.9 31.5 - 35.7 g/dL Final     RDW   Date Value Ref Range Status   06/27/2025 13.2 12.3 - 15.4 % Final     RDW-SD   Date Value Ref Range Status   06/27/2025 43.8 37.0 - 54.0 fl Final     MPV   Date Value Ref Range Status   06/27/2025 9.5 6.0 - 12.0 fL Final     Platelets   Date Value Ref Range Status   06/27/2025 214 140 - 450 10*3/mm3 Final     Neutrophil %   Date Value Ref Range Status   06/27/2025 74.8 42.7 - 76.0 % Final     Lymphocyte %   Date Value Ref Range Status   06/27/2025 10.9 (L) 19.6 - 45.3 % Final     Monocyte %   Date Value Ref Range Status   06/27/2025 11.9 5.0 - 12.0 % Final     Eosinophil %   Date Value Ref Range Status   06/27/2025 1.5 0.3 - 6.2 % Final     Basophil %   Date Value Ref Range Status   06/27/2025 0.5 0.0 - 1.5 % Final     Immature Grans %   Date Value Ref Range Status   06/27/2025 0.4 0.0 - 0.5 % Final     Neutrophils, Absolute   Date Value Ref Range Status   06/27/2025 10.56 (H) 1.70 - 7.00 10*3/mm3 Final     Lymphocytes, Absolute   Date Value Ref Range Status   06/27/2025 1.53 0.70 - 3.10 10*3/mm3 Final     Monocytes, Absolute   Date Value Ref Range Status   06/27/2025 1.67 (H) 0.10 - 0.90 10*3/mm3 Final     Eosinophils, Absolute   Date Value Ref Range Status   06/27/2025 0.21 0.00 - 0.40 10*3/mm3 Final     Basophils, Absolute   Date Value Ref Range Status   06/27/2025 0.07 0.00 - 0.20  10*3/mm3 Final     Immature Grans, Absolute   Date Value Ref Range Status   06/27/2025 0.05 0.00 - 0.05 10*3/mm3 Final     nRBC   Date Value Ref Range Status   06/27/2025 0.0 0.0 - 0.2 /100 WBC Final         Lab Results   Component Value Date    GLUCOSE 158 (H) 06/27/2025    BUN 12.5 06/27/2025    CREATININE 1.11 06/27/2025     (L) 06/27/2025    K 4.1 06/27/2025    CL 97 (L) 06/27/2025    CALCIUM 8.6 06/27/2025    PROTEINTOT 7.3 06/27/2025    ALBUMIN 4.1 06/27/2025    ALT 10 06/27/2025    AST 14 06/27/2025    ALKPHOS 103 06/27/2025    BILITOT 1.1 06/27/2025    GLOB 3.2 06/27/2025    AGRATIO 1.3 06/27/2025    BCR 11.3 06/27/2025    ANIONGAP 11.2 06/27/2025    EGFR 74.2 06/27/2025       Patient seen in no apparent distress.      PHYSICAL EXAM:     Foot/Ankle Exam    GENERAL  Appearance:  appears stated age  Orientation:  AAOx3  Affect:  appropriate  Gait:  unimpaired  Assistance:  independent  Right shoe gear: surgical shoe  Left shoe gear: casual shoe    VASCULAR     Right Foot Vascularity   Normal vascular exam    Dorsalis pedis:  2+  Posterior tibial:  2+  Skin temperature:  warm  Edema grading:  None  CFT:  < 3 seconds  Pedal hair growth:  Present  Varicosities:  none     Left Foot Vascularity   Normal vascular exam    Dorsalis pedis:  2+  Posterior tibial:  2+  Skin temperature:  warm  Edema grading:  None  CFT:  < 3 seconds  Pedal hair growth:  Present  Varicosities:  none     NEUROLOGIC     Right Foot Neurologic   Light touch sensation: diminished  Vibratory sensation: diminished  Hot/Cold sensation: diminished  Protective Sensation using [place]-Satya Monofilament:   Sites intact: 2  Sites tested: 10     Left Foot Neurologic   Light touch sensation: diminished  Vibratory sensation: diminished  Hot/Cold sensation:  diminished  Protective Sensation using [place]-Satya Monofilament:   Sites intact: 2  Sites tested: 10    MUSCLE STRENGTH     Right Foot Muscle Strength   Foot dorsiflexion:  4  Foot  plantar flexion:  4  Foot inversion:  4  Foot eversion:  4     Left Foot Muscle Strength   Foot dorsiflexion:  4  Foot plantar flexion:  4  Foot inversion:  4  Foot eversion:  4    RANGE OF MOTION     Right Foot Range of Motion   Foot and ankle ROM within normal limits       Left Foot Range of Motion   Foot and ankle ROM within normal limits      DERMATOLOGIC      Right Foot Dermatologic   Skin  Right foot skin is intact.      Left Foot Dermatologic   Skin  Positive for cellulitis and ulcer.      Left foot additional comments: Ulceration and cellulitis on dorsal left hallux.                Physical Exam      RADIOLOGY:      XR Foot 3+ View Left  Result Date: 6/27/2025  Narrative: XR FOOT 3+ VW LEFT Date of Exam: 6/27/2025 11:56 AM EDT Indication: wound Comparison: Left foot 8/23/2024 Findings: Evaluation of the left foot demonstrates extensive dorsal and first digit soft tissue swelling. No definite cortical disruption noted involving the first proximal phalanx or distal phalanx. However, there is lucency within the head of the first metatarsal bone which may be related to prior fracture and bone resorption however additional infection not entirely excluded. There is joint space narrowing at the first DIP joint. Additional findings include degenerative changes in the midfoot with dorsal osteophyte. Plantar calcaneal spur is noted.     Impression: Impression: Lucency within the head of the first metatarsal bone some of which could be related to prior trauma however there is extensive soft tissue swelling involving the first digit and dorsal margin of the foot and underlying osteomyelitis not excluded. Further  evaluation with MRI recommended for better evaluation. Electronically Signed: Keyona Carlson MD  6/27/2025 12:19 PM EDT  Workstation ID: [id number]    Recent wound cultures performed in the emergency room show that his current prescription of Bactrim covers what was cultured in his left great  toe.    ASSESSMENT/PLAN     Diagnoses and all orders for this visit:    1. Type 2 diabetes mellitus with polyneuropathy (Primary)    2. Neuropathy    3. Pressure injury of left foot, stage 3    4. Osteomyelitis of ankle or foot, acute, left  -     MRI Foot Left With & Without Contrast; Future    5. Cellulitis of toe of left foot      Assessment & Plan  1. Ulcer on the left great toe.  The x-ray results suggest a potential bone infection. He is currently on Bactrim, which is effective against the cultured bacteria. He is advised to continue applying Betadine to the ulcer. An MRI has been ordered for further evaluation. If the MRI confirms a bone infection, treatment options include extended antibiotic therapy, either orally or via a PICC line, and hyperbaric oxygen therapy.    Patient continue twice daily wound care to the left hallux with Betadine and light gauze dressing.    [facility] wound care center was contacted for the patient to have a mammogram scheduled later this week.    A message was sent to LORENA Massey.    Comprehensive lower extremity examination and evaluation was performed.    Discussed findings and treatment plan including risks, benefits, and treatment options with patient in detail. Patient agreed with treatment plan.    Medications and allergies reviewed.  Reviewed available lab values along with other pertinent labs.  These were discussed with the patient.    An After Visit Summary was printed and given to the patient at discharge, including (if requested) any available informative/educational handouts regarding diagnosis, treatment, or medications. All questions were answered to patient/family satisfaction. Should symptoms fail to improve or worsen they agree to call or return to clinic or to go to the Emergency Department. Discussed the importance of following up with any needed screening tests/labs/specialist appointments and any requested follow-up recommended by me today.  Importance of maintaining follow-up discussed and patient accepts that missed appointments can delay diagnosis and potentially lead to worsening of conditions.    Return if symptoms worsen or fail to improve., or sooner if acute issues arise.    This document has been electronically signed by Ervin Whitehead DPM on June 30, 2025 09:53 EDT

## 2025-07-02 LAB
BACTERIA SPEC AEROBE CULT: NORMAL
BACTERIA SPEC AEROBE CULT: NORMAL

## 2025-07-08 ENCOUNTER — OFFICE VISIT (OUTPATIENT)
Dept: WOUND CARE | Facility: HOSPITAL | Age: 65
End: 2025-07-08
Payer: COMMERCIAL

## 2025-07-08 ENCOUNTER — HOSPITAL ENCOUNTER (INPATIENT)
Facility: HOSPITAL | Age: 65
LOS: 6 days | Discharge: HOME | End: 2025-07-14
Attending: INTERNAL MEDICINE | Admitting: INTERNAL MEDICINE
Payer: COMMERCIAL

## 2025-07-08 VITALS
DIASTOLIC BLOOD PRESSURE: 76 MMHG | RESPIRATION RATE: 18 BRPM | HEART RATE: 62 BPM | TEMPERATURE: 98.3 F | SYSTOLIC BLOOD PRESSURE: 133 MMHG

## 2025-07-08 DIAGNOSIS — L97.511 DIABETIC ULCER OF TOE OF RIGHT FOOT ASSOCIATED WITH TYPE 2 DIABETES MELLITUS, LIMITED TO BREAKDOWN OF SKIN: ICD-10-CM

## 2025-07-08 DIAGNOSIS — L08.9 TYPE 2 DIABETES MELLITUS WITH LEFT DIABETIC FOOT INFECTION: ICD-10-CM

## 2025-07-08 DIAGNOSIS — L08.9 WOUND INFECTION: ICD-10-CM

## 2025-07-08 DIAGNOSIS — E11.628 TYPE 2 DIABETES MELLITUS WITH LEFT DIABETIC FOOT INFECTION: ICD-10-CM

## 2025-07-08 DIAGNOSIS — M86.00 ACUTE HEMATOGENOUS OSTEOMYELITIS, UNSPECIFIED SITE: Primary | ICD-10-CM

## 2025-07-08 DIAGNOSIS — L02.612 ABSCESS OF LEFT GREAT TOE: ICD-10-CM

## 2025-07-08 DIAGNOSIS — E11.621 DIABETIC ULCER OF TOE OF LEFT FOOT ASSOCIATED WITH TYPE 2 DIABETES MELLITUS, WITH NECROSIS OF BONE: Primary | ICD-10-CM

## 2025-07-08 DIAGNOSIS — T14.8XXA WOUND INFECTION: ICD-10-CM

## 2025-07-08 DIAGNOSIS — R26.2 DIFFICULTY WALKING: ICD-10-CM

## 2025-07-08 DIAGNOSIS — E11.65 UNCONTROLLED TYPE 2 DIABETES MELLITUS WITH HYPERGLYCEMIA: ICD-10-CM

## 2025-07-08 DIAGNOSIS — L97.524 DIABETIC ULCER OF TOE OF LEFT FOOT ASSOCIATED WITH TYPE 2 DIABETES MELLITUS, WITH NECROSIS OF BONE: Primary | ICD-10-CM

## 2025-07-08 DIAGNOSIS — M86.9 OSTEOMYELITIS OF GREAT TOE OF LEFT FOOT: ICD-10-CM

## 2025-07-08 DIAGNOSIS — E11.621 DIABETIC ULCER OF TOE OF RIGHT FOOT ASSOCIATED WITH TYPE 2 DIABETES MELLITUS, LIMITED TO BREAKDOWN OF SKIN: ICD-10-CM

## 2025-07-08 LAB
ALBUMIN SERPL-MCNC: 3.7 G/DL (ref 3.5–5.2)
ALBUMIN/GLOB SERPL: 0.8 G/DL
ALP SERPL-CCNC: 134 U/L (ref 39–117)
ALT SERPL W P-5'-P-CCNC: 9 U/L (ref 1–41)
ANION GAP SERPL CALCULATED.3IONS-SCNC: 14.6 MMOL/L (ref 5–15)
AST SERPL-CCNC: 16 U/L (ref 1–40)
BASOPHILS # BLD AUTO: 0.08 10*3/MM3 (ref 0–0.2)
BASOPHILS NFR BLD AUTO: 1 % (ref 0–1.5)
BILIRUB SERPL-MCNC: 0.5 MG/DL (ref 0–1.2)
BUN SERPL-MCNC: 10.3 MG/DL (ref 8–23)
BUN/CREAT SERPL: 8.7 (ref 7–25)
CALCIUM SPEC-SCNC: 9.4 MG/DL (ref 8.6–10.5)
CHLORIDE SERPL-SCNC: 97 MMOL/L (ref 98–107)
CO2 SERPL-SCNC: 18.4 MMOL/L (ref 22–29)
CREAT SERPL-MCNC: 1.19 MG/DL (ref 0.76–1.27)
D-LACTATE SERPL-SCNC: 1.6 MMOL/L (ref 0.5–2)
DEPRECATED RDW RBC AUTO: 43.8 FL (ref 37–54)
EGFRCR SERPLBLD CKD-EPI 2021: 68.2 ML/MIN/1.73
EOSINOPHIL # BLD AUTO: 0.46 10*3/MM3 (ref 0–0.4)
EOSINOPHIL NFR BLD AUTO: 6 % (ref 0.3–6.2)
ERYTHROCYTE [DISTWIDTH] IN BLOOD BY AUTOMATED COUNT: 12.9 % (ref 12.3–15.4)
GLOBULIN UR ELPH-MCNC: 4.6 GM/DL
GLUCOSE BLDC GLUCOMTR-MCNC: 122 MG/DL (ref 70–99)
GLUCOSE BLDC GLUCOMTR-MCNC: 130 MG/DL (ref 70–99)
GLUCOSE BLDC GLUCOMTR-MCNC: 148 MG/DL (ref 70–99)
GLUCOSE SERPL-MCNC: 125 MG/DL (ref 65–99)
HCT VFR BLD AUTO: 46.9 % (ref 37.5–51)
HGB BLD-MCNC: 14.9 G/DL (ref 13–17.7)
HOLD SPECIMEN: NORMAL
HOLD SPECIMEN: NORMAL
IMM GRANULOCYTES # BLD AUTO: 0.03 10*3/MM3 (ref 0–0.05)
IMM GRANULOCYTES NFR BLD AUTO: 0.4 % (ref 0–0.5)
LYMPHOCYTES # BLD AUTO: 1.64 10*3/MM3 (ref 0.7–3.1)
LYMPHOCYTES NFR BLD AUTO: 21.5 % (ref 19.6–45.3)
MAGNESIUM SERPL-MCNC: 2.1 MG/DL (ref 1.6–2.4)
MCH RBC QN AUTO: 29.4 PG (ref 26.6–33)
MCHC RBC AUTO-ENTMCNC: 31.8 G/DL (ref 31.5–35.7)
MCV RBC AUTO: 92.7 FL (ref 79–97)
MONOCYTES # BLD AUTO: 0.48 10*3/MM3 (ref 0.1–0.9)
MONOCYTES NFR BLD AUTO: 6.3 % (ref 5–12)
NEUTROPHILS NFR BLD AUTO: 4.95 10*3/MM3 (ref 1.7–7)
NEUTROPHILS NFR BLD AUTO: 64.8 % (ref 42.7–76)
NRBC BLD AUTO-RTO: 0 /100 WBC (ref 0–0.2)
PHOSPHATE SERPL-MCNC: 3.9 MG/DL (ref 2.5–4.5)
PLATELET # BLD AUTO: 284 10*3/MM3 (ref 140–450)
PMV BLD AUTO: 8.5 FL (ref 6–12)
POTASSIUM SERPL-SCNC: 4.8 MMOL/L (ref 3.5–5.2)
PROCALCITONIN SERPL-MCNC: 0.06 NG/ML (ref 0–0.25)
PROT SERPL-MCNC: 8.3 G/DL (ref 6–8.5)
RBC # BLD AUTO: 5.06 10*6/MM3 (ref 4.14–5.8)
SODIUM SERPL-SCNC: 130 MMOL/L (ref 136–145)
WBC NRBC COR # BLD AUTO: 7.64 10*3/MM3 (ref 3.4–10.8)
WHOLE BLOOD HOLD SPECIMEN: NORMAL

## 2025-07-08 PROCEDURE — 25810000003 SODIUM CHLORIDE 0.9 % SOLUTION: Performed by: STUDENT IN AN ORGANIZED HEALTH CARE EDUCATION/TRAINING PROGRAM

## 2025-07-08 PROCEDURE — 87070 CULTURE OTHR SPECIMN AEROBIC: CPT | Performed by: EMERGENCY MEDICINE

## 2025-07-08 PROCEDURE — 80053 COMPREHEN METABOLIC PANEL: CPT | Performed by: STUDENT IN AN ORGANIZED HEALTH CARE EDUCATION/TRAINING PROGRAM

## 2025-07-08 PROCEDURE — 25010000002 KETOROLAC TROMETHAMINE PER 15 MG: Performed by: STUDENT IN AN ORGANIZED HEALTH CARE EDUCATION/TRAINING PROGRAM

## 2025-07-08 PROCEDURE — 87040 BLOOD CULTURE FOR BACTERIA: CPT | Performed by: STUDENT IN AN ORGANIZED HEALTH CARE EDUCATION/TRAINING PROGRAM

## 2025-07-08 PROCEDURE — 87205 SMEAR GRAM STAIN: CPT | Performed by: STUDENT IN AN ORGANIZED HEALTH CARE EDUCATION/TRAINING PROGRAM

## 2025-07-08 PROCEDURE — 25010000002 AMPICILLIN-SULBACTAM PER 1.5 G: Performed by: STUDENT IN AN ORGANIZED HEALTH CARE EDUCATION/TRAINING PROGRAM

## 2025-07-08 PROCEDURE — 87205 SMEAR GRAM STAIN: CPT | Performed by: EMERGENCY MEDICINE

## 2025-07-08 PROCEDURE — 84100 ASSAY OF PHOSPHORUS: CPT | Performed by: STUDENT IN AN ORGANIZED HEALTH CARE EDUCATION/TRAINING PROGRAM

## 2025-07-08 PROCEDURE — 85025 COMPLETE CBC W/AUTO DIFF WBC: CPT | Performed by: STUDENT IN AN ORGANIZED HEALTH CARE EDUCATION/TRAINING PROGRAM

## 2025-07-08 PROCEDURE — 87186 SC STD MICRODIL/AGAR DIL: CPT | Performed by: EMERGENCY MEDICINE

## 2025-07-08 PROCEDURE — 87147 CULTURE TYPE IMMUNOLOGIC: CPT | Performed by: EMERGENCY MEDICINE

## 2025-07-08 PROCEDURE — 87070 CULTURE OTHR SPECIMN AEROBIC: CPT | Performed by: STUDENT IN AN ORGANIZED HEALTH CARE EDUCATION/TRAINING PROGRAM

## 2025-07-08 PROCEDURE — 82948 REAGENT STRIP/BLOOD GLUCOSE: CPT

## 2025-07-08 PROCEDURE — 99223 1ST HOSP IP/OBS HIGH 75: CPT | Performed by: STUDENT IN AN ORGANIZED HEALTH CARE EDUCATION/TRAINING PROGRAM

## 2025-07-08 PROCEDURE — 83735 ASSAY OF MAGNESIUM: CPT | Performed by: STUDENT IN AN ORGANIZED HEALTH CARE EDUCATION/TRAINING PROGRAM

## 2025-07-08 PROCEDURE — 83605 ASSAY OF LACTIC ACID: CPT | Performed by: STUDENT IN AN ORGANIZED HEALTH CARE EDUCATION/TRAINING PROGRAM

## 2025-07-08 PROCEDURE — 84145 PROCALCITONIN (PCT): CPT | Performed by: STUDENT IN AN ORGANIZED HEALTH CARE EDUCATION/TRAINING PROGRAM

## 2025-07-08 RX ORDER — BISACODYL 5 MG/1
5 TABLET, DELAYED RELEASE ORAL DAILY PRN
Status: DISCONTINUED | OUTPATIENT
Start: 2025-07-08 | End: 2025-07-14 | Stop reason: HOSPADM

## 2025-07-08 RX ORDER — DEXTROSE MONOHYDRATE 25 G/50ML
25 INJECTION, SOLUTION INTRAVENOUS
Status: DISCONTINUED | OUTPATIENT
Start: 2025-07-08 | End: 2025-07-14 | Stop reason: HOSPADM

## 2025-07-08 RX ORDER — IBUPROFEN 600 MG/1
1 TABLET ORAL
Status: DISCONTINUED | OUTPATIENT
Start: 2025-07-08 | End: 2025-07-14 | Stop reason: HOSPADM

## 2025-07-08 RX ORDER — ACETAMINOPHEN 325 MG/1
650 TABLET ORAL EVERY 6 HOURS PRN
Status: DISCONTINUED | OUTPATIENT
Start: 2025-07-08 | End: 2025-07-14 | Stop reason: HOSPADM

## 2025-07-08 RX ORDER — INSULIN LISPRO 100 [IU]/ML
2-7 INJECTION, SOLUTION INTRAVENOUS; SUBCUTANEOUS
Status: DISCONTINUED | OUTPATIENT
Start: 2025-07-08 | End: 2025-07-14 | Stop reason: HOSPADM

## 2025-07-08 RX ORDER — POLYETHYLENE GLYCOL 3350 17 G/17G
17 POWDER, FOR SOLUTION ORAL DAILY PRN
Status: DISCONTINUED | OUTPATIENT
Start: 2025-07-08 | End: 2025-07-14 | Stop reason: HOSPADM

## 2025-07-08 RX ORDER — HEPARIN SODIUM 5000 [USP'U]/ML
5000 INJECTION, SOLUTION INTRAVENOUS; SUBCUTANEOUS EVERY 8 HOURS SCHEDULED
Status: DISCONTINUED | OUTPATIENT
Start: 2025-07-08 | End: 2025-07-14 | Stop reason: HOSPADM

## 2025-07-08 RX ORDER — BISACODYL 10 MG
10 SUPPOSITORY, RECTAL RECTAL DAILY PRN
Status: DISCONTINUED | OUTPATIENT
Start: 2025-07-08 | End: 2025-07-14 | Stop reason: HOSPADM

## 2025-07-08 RX ORDER — NICOTINE POLACRILEX 4 MG
15 LOZENGE BUCCAL
Status: DISCONTINUED | OUTPATIENT
Start: 2025-07-08 | End: 2025-07-14 | Stop reason: HOSPADM

## 2025-07-08 RX ORDER — SODIUM CHLORIDE 0.9 % (FLUSH) 0.9 %
10 SYRINGE (ML) INJECTION EVERY 12 HOURS SCHEDULED
Status: DISCONTINUED | OUTPATIENT
Start: 2025-07-08 | End: 2025-07-14 | Stop reason: HOSPADM

## 2025-07-08 RX ORDER — SULFAMETHOXAZOLE AND TRIMETHOPRIM 800; 160 MG/1; MG/1
1 TABLET ORAL 2 TIMES DAILY
COMMUNITY
End: 2025-07-14 | Stop reason: HOSPADM

## 2025-07-08 RX ORDER — KETOROLAC TROMETHAMINE 30 MG/ML
15 INJECTION, SOLUTION INTRAMUSCULAR; INTRAVENOUS EVERY 6 HOURS PRN
Status: DISPENSED | OUTPATIENT
Start: 2025-07-08 | End: 2025-07-09

## 2025-07-08 RX ORDER — AMOXICILLIN 250 MG
2 CAPSULE ORAL 2 TIMES DAILY PRN
Status: DISCONTINUED | OUTPATIENT
Start: 2025-07-08 | End: 2025-07-14 | Stop reason: HOSPADM

## 2025-07-08 RX ORDER — SODIUM CHLORIDE 9 MG/ML
40 INJECTION, SOLUTION INTRAVENOUS AS NEEDED
Status: DISCONTINUED | OUTPATIENT
Start: 2025-07-08 | End: 2025-07-14 | Stop reason: HOSPADM

## 2025-07-08 RX ORDER — SODIUM CHLORIDE 0.9 % (FLUSH) 0.9 %
10 SYRINGE (ML) INJECTION AS NEEDED
Status: DISCONTINUED | OUTPATIENT
Start: 2025-07-08 | End: 2025-07-14 | Stop reason: HOSPADM

## 2025-07-08 RX ORDER — GABAPENTIN 300 MG/1
300 CAPSULE ORAL 3 TIMES DAILY
Status: DISCONTINUED | OUTPATIENT
Start: 2025-07-08 | End: 2025-07-14 | Stop reason: HOSPADM

## 2025-07-08 RX ADMIN — Medication 10 ML: at 14:18

## 2025-07-08 RX ADMIN — KETOROLAC TROMETHAMINE 15 MG: 30 INJECTION, SOLUTION INTRAMUSCULAR; INTRAVENOUS at 18:14

## 2025-07-08 RX ADMIN — AMPICILLIN SODIUM AND SULBACTAM SODIUM 3 G: 2; 1 INJECTION, POWDER, FOR SOLUTION INTRAMUSCULAR; INTRAVENOUS at 19:34

## 2025-07-08 RX ADMIN — GABAPENTIN 300 MG: 300 CAPSULE ORAL at 22:01

## 2025-07-08 RX ADMIN — SODIUM CHLORIDE 1000 ML: 9 INJECTION, SOLUTION INTRAVENOUS at 15:33

## 2025-07-08 RX ADMIN — AMPICILLIN SODIUM AND SULBACTAM SODIUM 3 G: 2; 1 INJECTION, POWDER, FOR SOLUTION INTRAMUSCULAR; INTRAVENOUS at 14:18

## 2025-07-08 RX ADMIN — Medication 10 ML: at 22:01

## 2025-07-08 NOTE — NURSING NOTE
Skin assessment completed with Adela QUEVEDO. Right great toe and second toe wound noted. Wound noted to right great toe. Blanchable redness to left inner groin.

## 2025-07-08 NOTE — PROGRESS NOTES
Chief Complaint  Wound Check (Pt here today for wound check to left great toe, pt  went to ER since last visit, pt went to podiatry at [facility] and referred to us. Pt interested in HBOT, currently taking Bactrim, MRI shows osteo. )    Subjective      History of Present Illness    Idris Park  is a 65 y.o. male     History of Present Illness  The patient is a 65-year-old male here for a recheck of wounds on his feet.    He was referred to Dr. Whitehead, a podiatrist, who ordered an MRI and subsequently referred him to Dr. Alfonso due to scheduling conflicts. He was seen by Dr. Alfonso  on 07/03/2025, who performed an examination, numbed the area, removed the toenail, and explored for any hidden pus pockets. Only blood was found. He was initially prescribed a 10d course of antibiotics, Bactrim DS, which he completed yesterday.  He had some leftover Bactrim from a prior prescription so took another dose this morning. He reports that his condition has continued to worsen. He is currently on short-term leave from work for at least three months. He has been using Dakin's solution with a gauze strip for wound care on his right great toe and Betadine gauze for his left great toe.    He has diabetes and mild neuropathy but is not currently on any medication for these conditions. He has never been on any medication for diabetes but his HgbA1c in May was 7.3.    MEDICATIONS  Current: Bactrim DS, Betadine gauze.      Allergies:  Cephalexin    No current facility-administered medications for this visit.  No current outpatient medications on file.    Facility-Administered Medications Ordered in Other Visits:     acetaminophen (TYLENOL) tablet 650 mg, 650 mg, Oral, Q6H PRN, Suman Chavez MD    sennosides-docusate (PERICOLACE) 8.6-50 MG per tablet 2 tablet, 2 tablet, Oral, BID PRN **AND** polyethylene glycol (MIRALAX) packet 17 g, 17 g, Oral, Daily PRN **AND** bisacodyl (DULCOLAX) EC tablet 5 mg, 5 mg, Oral, Daily PRN  **AND** bisacodyl (DULCOLAX) suppository 10 mg, 10 mg, Rectal, Daily PRN, Suman Chavez MD    dextrose (D50W) (25 g/50 mL) IV injection 25 g, 25 g, Intravenous, Q15 Min PRN, Suman Chavez MD    dextrose (GLUTOSE) oral gel 15 g, 15 g, Oral, Q15 Min PRN, Suman Chavez MD    glucagon (GLUCAGEN) injection 1 mg, 1 mg, Intramuscular, Q15 Min PRN, Suman Chavez MD    heparin (porcine) 5000 UNIT/ML injection 5,000 Units, 5,000 Units, Subcutaneous, Q8H, Suman Chavez MD    Insulin Lispro (humaLOG) injection 2-7 Units, 2-7 Units, Subcutaneous, 4x Daily AC & at Bedtime, Suman Chavez MD    ketorolac (TORADOL) injection 15 mg, 15 mg, Intravenous, Q6H PRN, Suman Chavez MD    Pharmacy To Dose: Ampicillin-sulbactam, , Not Applicable, Continuous PRN, Suman Chavez MD    sodium chloride 0.9 % bolus 1,000 mL, 1,000 mL, Intravenous, Once, Suman Chavez MD    sodium chloride 0.9 % flush 10 mL, 10 mL, Intravenous, Q12H, Suman Chavez MD    sodium chloride 0.9 % flush 10 mL, 10 mL, Intravenous, PRN, Suman Chavez MD    sodium chloride 0.9 % infusion 40 mL, 40 mL, Intravenous, PRN, Suman Chavez MD    Past Medical History:   Diagnosis Date    Acromioclavicular separation 2004    Arthritis     COVID-19     Diabetes     Foot ulcer     Hammer toe     Hyperlipidemia     Knee swelling 2000    Limb pain     Limb swelling     Neuropathy in diabetes      Past Surgical History:   Procedure Laterality Date    DENTAL PROCEDURE      EXTERNAL EAR SURGERY Bilateral     HERNIA REPAIR  1967    also 2011    JOINT REPLACEMENT Left     Artificial Joints/Limbs    KNEE ARTHROPLASTY Right     OTHER SURGICAL HISTORY      JOINT SURGERY     Social History     Socioeconomic History    Marital status:    Tobacco Use    Smoking status: Never     Passive exposure: Past    Smokeless tobacco: Never   Vaping Use    Vaping status: Never Used   Substance and Sexual Activity    Alcohol use: Not  Currently     Comment: socially-BEER MONTHLY    Drug use: Never    Sexual activity: Yes     Partners: Female     Birth control/protection: None           Objective     Vitals:    07/08/25 0834   BP: 133/76   BP Location: Right arm   Patient Position: Sitting   Cuff Size: Adult   Pulse: 62   Resp: 18  Comment: 96% RA   Temp: 98.3 °F (36.8 °C)   TempSrc: Temporal   PainSc: 0-No pain     There is no height or weight on file to calculate BMI.    STEADI Fall Risk Assessment has not been completed.     Review of Systems     ROS:  Per HPI.     I have reviewed the HPI and ROS as documented by MA/RN. Elke Ford MD    Physical Exam     NAD  AAOx3, pleasant, cooperative    Physical Exam  The left great toe is severely swollen and infected with a large open wound with fat layer exposed on the dorsum of the IP joint as well as a wound with deep tunneling at the site of the recent toenail removal.  There is purulent drainage coming from a pinpoint opening along the lateral aspect of the great toe in the first webspace.  Culture from deep in the wounds and of the purulent drainage laterally was obtained.  Toe is swollen, erythematous, warm, and tender.  There is bony crepitus at the IP joint.  Callus distal left third toe without underlying wound.  There is a small wound on the plantar right great toe with deep tissue pressure injury and old blood blister noted.  Mild callus present.  Left:                            Result Review :  The following data was reviewed by: Elke Ford MD on 07/08/2025:    Prior notes and images.  Hemoglobin A1c November 2024 6.7, hemoglobin A1c May 2025 7.3, podiatry notes, MRI left foot, x-ray left foot, ED notes      Wound debridement    Performed by: Elke Ford MD  Authorized by: Elke Ford MD  Associated wounds:   Wound 07/26/24 1347 Right great toe    Correct patient: Identification verified by two methods:     Verbally and Date of birth  Correct procedure/consent    Correct  site/side    Correct equipment as applicable    How many wounds are you performing a debridement on?:  1  Wound 1:     Nursing Documentation:   Measurements:     The total amount debrided on this wound was under 20 cm2.     Provider Documentation    Debridement type:  Sharp/excisional    Betadine       None    Tissue debrided:  Moderate    Level removed:  Subcutaneous    Patient tolerance:  Good    Hemostasis achieved by:  Direct pressure    Wound Bed Post Debridement: See Photo            Assessment and Plan   Diagnoses and all orders for this visit:    1. Diabetic ulcer of toe of left foot associated with type 2 diabetes mellitus, with necrosis of bone (Primary)  -     Wound Culture - Wound, Toe, Left  -     WOUND CARE HYPERBARIC; Future    2. Wound infection    3. Type 2 diabetes mellitus with left diabetic foot infection    4. Diabetic ulcer of toe of right foot associated with type 2 diabetes mellitus, limited to breakdown of skin  -     Wound debridement    5. Uncontrolled type 2 diabetes mellitus with hyperglycemia    6. Abscess of left great toe    7. Osteomyelitis of great toe of left foot        Assessment & Plan  1. Foot wound.  The left great toe wounds and infection are severe, with significant damage already present. The MRI results from 06/30 indicate a bone infection extending throughout distal and proximal phalanx and into the metatarsal bone, as well as a septic joint and abscess, suggesting a high risk for potential amputation. The presence of bony crepitus and a pocket of infection are concerning as he has not undergone a thorough I&D. His diabetes and neuropathy further complicate the healing process. He has been on appropriate antibiotics, but the condition has worsened, necessitating more aggressive treatment. He will be admitted to the hospital for stronger antibiotics and surgical intervention.  Hyperbaric oxygen therapy was recommended to aid in faster healing post-amputation and to improve  tissue health.  Patient understands and agrees with the need for admission.  Dr. Arevalo, hospitalist, accepts the patient for direct admission.  Dr. Whitehead notified of patient's pending admission to the hospital and likely need for left great toe amputation.    2. Diabetes mellitus.  His diabetes is uncontrolled, which increases the risk of infection and complicates wound healing. He has never been on any medication for diabetes. The importance of managing his diabetes to improve overall health and wound healing was discussed.  He will likely be started on medication in the hospital for his untreated DM, and sent home with diabetic medications.    3. Neuropathy.  He has neuropathy, which affects tissue health and nerve response, complicating wound healing. The condition was discussed in relation to his current foot infection and the challenges it presents in treatment.    Recheck within 1 to 2 weeks of discharge from the hospital, depending on interventions performed and residual wound needs.      Patient was given instructions and counseling regarding their condition or for health maintenance advice, as well as the wound care plan and recommendations. They understand and agree with the plan.  They will follow back up here in the clinic but are instructed to contact us in the interim should they have any new, returning, or worsening symptoms or concerns. Please see specific information pulled into the AVS if appropriate.     Dragon Dictation utilized for chart completion.    I spent 75 minutes in both face-to-face and nonface-to-face time for patient care today including, but not limited to, review of old records, reviewing old images, history and physical exam, reviewing test results, counseling patient and family, entering orders, coordinating care, contacting other physicians, and documenting.  This is separate from the above documented procedure.      Follow Up   Return in about 2 weeks (around  7/22/2025).      Elke Ford MD    Patient or patient representative verbalized consent for the use of Ambient Listening during the visit with  Elke Ford MD for chart documentation. 7/8/2025  12:38 EDT

## 2025-07-08 NOTE — PLAN OF CARE
Patient seen today in wound care clinic, has left great toe ulceration, drainage, swelling and erythema.  Patient had recent MRI of the toe which was significant for osteomyelitis.  Reportedly patient had toe nail removed by [facility] recently as he was unable to get in with his primary podiatrist Dr. Whitehead.  Patient not ill-appearing but with worsening wound Dr. Fitzgerald has requested admission to the hospital for IV antibiotics and podiatry evaluation.

## 2025-07-08 NOTE — H&P
[facility]  Date: 2025   Patient Name: Idris Park  : 1960  MRN: [id number]  Primary Care Physician:  Dae Yan APRN  Date of admission: 2025    Subjective   Subjective     Chief Complaint: Left toe osteomyelitis    HPI:    Idris Park is a 65 y.o. male  with a past medical history of peripheral neuropathy, hyperlipidemia, diabetes mellitus sent to the hospital as a direct admit from wound care clinic for evaluation and management of left great toe wound infection.  Recent outpatient MRI showed osteomyelitis of the first distal phalanx, first proximal phalanx and distal portion of the first metatarsal, extensive cellulitis of the great toe.  15 x 11 x 10 mm abscess with fistulous tract extending to the dorsal surface of the first toe with evidence of resting of septic joint.  Marked soft tissue edema of the dorsum of the foot and within the intermuscular soft tissue suggests cellulitis versus venous edema.  Wound cultures from 2025 showed positive for Streptococcus and Staphylococcus aureus, susceptible to oxacillin.  Patient follows Dr. Whitehead and also seen by podiatrist at [facility] on 7/3/2025 and his toenail has been removed during that visit.    Upon arrival, vital signs temperature 97.9, pulse 60, blood pressure 114/67 on room air saturating at 96%.  Denies any recent fevers.  Patient has been on Bactrim for the last 2 weeks.  Intermittent pain in the left toe.  Denies any nausea, vomiting, chest pain, shortness of breath, fevers, calf pain.    Personal History     Past Medical History:   Diagnosis Date    Acromioclavicular separation 2004    Arthritis     COVID-19     Diabetes     Foot ulcer     Hammer toe     Hyperlipidemia     Knee swelling 2000    Limb pain     Limb swelling     Neuropathy in diabetes          Past Surgical History:   Procedure Laterality Date    DENTAL PROCEDURE      EXTERNAL EAR SURGERY Bilateral      HERNIA REPAIR  1967    also 2011    JOINT REPLACEMENT Left     Artificial Joints/Limbs    KNEE ARTHROPLASTY Right     OTHER SURGICAL HISTORY      JOINT SURGERY         Family History   Problem Relation Age of Onset    Cancer Mother     Breast cancer Mother 50         Social History     Socioeconomic History    Marital status:    Tobacco Use    Smoking status: Never     Passive exposure: Past    Smokeless tobacco: Never   Vaping Use    Vaping status: Never Used   Substance and Sexual Activity    Alcohol use: Not Currently     Comment: socially-BEER MONTHLY    Drug use: Never    Sexual activity: Yes     Partners: Female     Birth control/protection: None         Home Medications:  cyclobenzaprine, etodolac, gabapentin, and rosuvastatin    Allergies:  Allergies   Allergen Reactions    Cephalexin Rash       Objective   Objective     Vitals:   Temp:  [97.9 °F (36.6 °C)-98.3 °F (36.8 °C)] 97.9 °F (36.6 °C)  Heart Rate:  [60-62] 60  Resp:  [16-18] 16  BP: (114-133)/(67-76) 114/67    Physical Exam    Constitutional: Awake, alert, no acute distress   Eyes: Pupils equal, sclerae anicteric, no conjunctival injection   HENT: NCAT, mucous membranes moist   Respiratory: Clear to auscultation bilaterally, nonlabored respirations    Cardiovascular: RRR, no murmurs   Gastrointestinal: Positive bowel sounds, soft, nontender, nondistended   Musculoskeletal: Swollen and erythematous left toe, no bilateral ankle edema, no clubbing or cyanosis to extremities   Neurologic: Oriented x 3, strength symmetric in all extremities, Cranial Nerves grossly intact to confrontation, speech clear   Skin: No rashes     Result Review    Result Review:  I have personally reviewed the results from the time of this admission to 7/8/2025 11:29 EDT and agree with these findings:  []  Laboratory  []  Microbiology  []  Radiology  []  EKG/Telemetry   []  Cardiology/Vascular   []  Pathology  []  Old records  []  Other:        Imaging Results (Last 24  Hours)       ** No results found for the last 24 hours. **             heparin (porcine), 5,000 Units, Subcutaneous, Q8H  insulin lispro, 2-7 Units, Subcutaneous, 4x Daily AC & at Bedtime  sodium chloride, 10 mL, Intravenous, Q12H         Assessment & Plan   Assessment / Plan     Assessment/Plan:   Osteomyelitis of the left toe first distal, proximal phalanx and distal portion of the first metatarsal  Possible septic arthritis of the left first toe joint  Cellulitis of the left foot  Wound cultures positive for MSSA and Streptococcus  Peripheral neuropathy  Hyperlipidemia  Type II diabetes mellitus    Plan  Admit to inpatient, MedSurg  Started on ampicillin-sulbactam  Podiatry consult Dr. Whitehead  Follow-up on CBC, CMP, mag, Phos, lactic acid, blood cultures, repeat wound cultures  Heart healthy, consistent carb diet  Low-dose sliding scale insulin  Fall precautions  PT OT consult when appropriate  Resume other appropriate home medications once reconciled    VTE Prophylaxis:  Pharmacologic VTE prophylaxis orders are present.    CODE STATUS:    Code Status (Patient has no pulse and is not breathing): CPR (Attempt to Resuscitate)  Medical Interventions (Patient has pulse or is breathing): Full Support  Level Of Support Discussed With: Patient      Admission Status:  I believe this patient meets inpatient status.    Part of this note may be an electronic transcription/translation of spoken language to printed text using the Dragon Dictation System    Suman Chavez MD

## 2025-07-08 NOTE — PAYOR COMM NOTE
"Idris Park (65 y.o. Male)      SENT TO ED FROM PODIATRY    PATIENT INFORMATION  Name:             Idris Park  MRN#:            [id number]        ADMISSION INFORMATION  CLASS:          Inpatient   DOS:               7/8/2025        ADMISSION DIAGNOSIS AND HOSPITAL PROBLEMS  ADMISSION DIAGNOSIS  Osteomyelitis [M86.9]        ADMITTING PROVIDER INFORMATION  Name/NPI       Jb Arevalo MD [[id number]]  Phone:            Phone: [phone]    HOSPITALISTS SAME ADDRESS AS            FACILITY              RENDERING FACILITY  Name:             [facility]   NPI:                 [id number]  TID:                 [id number]  Address:        [address] [place] [address]        CASE MANAGEMENT CONTACT INFORMATION  Name:             NAIN Rosales  Phone:            [phone]  Fax:                [phone]   Date of Birth   1960    Social Security Number       Address   [address] [place] [address]    Home Phone   [phone]    MRN   [id number]       Buddhist   Sabianist    Marital Status                               Admission Date   7/8/2025    Admission Type   Elective    Admitting Provider   Jb Arevalo MD    Attending Provider   Jb Arevalo MD    Department, Room/Bed   [address], 4006/1       Discharge Date       Discharge Disposition       Discharge Destination                                 Attending Provider: Jb Arevalo MD    Allergies: Cephalexin    Isolation: None   Infection: None   Code Status: CPR    Ht: 175.3 cm (69\")   Wt: 107 kg (236 lb 1.8 oz)    Admission Cmt: None   Principal Problem: Osteomyelitis [M86.9]                   Active Insurance as of 7/8/2025       Primary Coverage       Payor Plan Insurance Group Employer/Plan Group    On license of [facility] On license of [facility] BLUE [facility] PPO [id number]       Payor Plan Address Payor Plan Phone Number Payor Plan Fax Number Effective Dates "     [id number] [phone]  1/1/2017 - None Entered    [facility] [id number]         Subscriber Name Subscriber Birth Date Member ID       JUDY JIMENEZ 1960 [id number]                     Emergency Contacts        (Rel.) Home Phone Work Phone Mobile Phone    wendi jimenez (Spouse) [phone] -- [phone]             Osteomyelitis RRG Inpatient Care       Indications Met   Last updated by Gabbie Hatch RN on 7/8/2025 1255     Review Status Created By   Primary Completed Gabbie Hatch RN      Criteria Review   Osteomyelitis RRG Inpatient Care     Overall Determination: Indications Met     Criteria:  [×] Admission is indicated for  1 or more  of the following :      [×] Osteomyelitis diagnosed or cannot be excluded in observation care (ie, empiric course of treatment with parenteral antibiotics planned) and  1 or more  of the following :          [×] Presentation is consistent with acute osteomyelitis (ie, not chronic osteomyelitis such as diabetic foot or relapse of previously treated osteomyelitis) [B] [E]              7/8/2025 12:55 PM                  -- 7/8/2025 12:55 PM by Gabbie Hatch, EMY --                      TREATED OUTPT-SENT HERE FROM PODIATRY                      Osteomyelitis of the left toe first distal, proximal phalanx and distal portion of the first metatarsal                      6/27 Wound cultures positive for MSSA and Streptococcus                                            98.3  62  133/76  SATS 96%     Notes:  -- 7/8/2025 12:55 PM by Gabbie Hatch, RN --      Subject: Admission                  Assessment/Plan:       Osteomyelitis of the left toe first distal, proximal phalanx and distal portion of the first metatarsal      Possible septic arthritis of the left first toe joint      Cellulitis of the left foot      Wound cultures positive for MSSA and Streptococcus      Peripheral neuropathy      Hyperlipidemia      Type II diabetes mellitus            Plan       Admit to inpatient, MedSurg      Started on ampicillin-sulbactam      Podiatry consult Dr. Whitehead      Follow-up on CBC, CMP, mag, Phos, lactic acid, blood cultures, repeat wound cultures      Heart healthy, consistent carb diet      Low-dose sliding scale insulin      Fall precautions      PT OT consult when appropriate      Resume other appropriate home medications once reconciled                                  History & Physical        Suman Chavez MD at 25 1110           [facility]  [facility]: [facility]   Patient Name: Idris Park  : 1960  MRN: [id number]  Primary Care Physician:  Dae Yan APRN  Date of admission: 2025    Subjective  Subjective     Chief Complaint: Left toe osteomyelitis    HPI:    Idris Park is a 65 y.o. male  with a past medical history of peripheral neuropathy, hyperlipidemia, diabetes mellitus sent to the hospital as a direct admit from wound care clinic for evaluation and management of left great toe wound infection.  Recent outpatient MRI showed osteomyelitis of the first distal phalanx, first proximal phalanx and distal portion of the first metatarsal, extensive cellulitis of the great toe.  15 x 11 x 10 mm abscess with fistulous tract extending to the dorsal surface of the first toe with evidence of resting of septic joint.  Marked soft tissue edema of the dorsum of the foot and within the intermuscular soft tissue suggests cellulitis versus venous edema.  Wound cultures from 2025 showed positive for Streptococcus and Staphylococcus aureus, susceptible to oxacillin.  Patient follows Dr. Whitehead and also seen by podiatrist at [facility] on 7/3/2025 and his toenail has been removed during that visit.    Upon arrival, vital signs temperature 97.9, pulse 60, blood pressure 114/67 on room air saturating at 96%.  Denies any recent fevers.  Patient has been on Bactrim for the last 2 weeks.  Intermittent  pain in the left toe.  Denies any nausea, vomiting, chest pain, shortness of breath, fevers, calf pain.    Personal History     Past Medical History:   Diagnosis Date    Acromioclavicular separation 2004    Arthritis     COVID-19     Diabetes     Foot ulcer     Hammer toe     Hyperlipidemia     Knee swelling 2000    Limb pain     Limb swelling     Neuropathy in diabetes          Past Surgical History:   Procedure Laterality Date    DENTAL PROCEDURE      EXTERNAL EAR SURGERY Bilateral     HERNIA REPAIR  1967    also 2011    JOINT REPLACEMENT Left     Artificial Joints/Limbs    KNEE ARTHROPLASTY Right     OTHER SURGICAL HISTORY      JOINT SURGERY         Family History   Problem Relation Age of Onset    Cancer Mother     Breast cancer Mother 50         Social History     Socioeconomic History    Marital status:    Tobacco Use    Smoking status: Never     Passive exposure: Past    Smokeless tobacco: Never   Vaping Use    Vaping status: Never Used   Substance and Sexual Activity    Alcohol use: Not Currently     Comment: socially-BEER MONTHLY    Drug use: Never    Sexual activity: Yes     Partners: Female     Birth control/protection: None         Home Medications:  cyclobenzaprine, etodolac, gabapentin, and rosuvastatin    Allergies:  Allergies   Allergen Reactions    Cephalexin Rash       Objective  Objective     Vitals:   Temp:  [97.9 °F (36.6 °C)-98.3 °F (36.8 °C)] 97.9 °F (36.6 °C)  Heart Rate:  [60-62] 60  Resp:  [16-18] 16  BP: (114-133)/(67-76) 114/67    Physical Exam    Constitutional: Awake, alert, no acute distress   Eyes: Pupils equal, sclerae anicteric, no conjunctival injection   HENT: NCAT, mucous membranes moist   Respiratory: Clear to auscultation bilaterally, nonlabored respirations    Cardiovascular: RRR, no murmurs   Gastrointestinal: Positive bowel sounds, soft, nontender, nondistended   Musculoskeletal: Swollen and erythematous left toe, no bilateral ankle edema, no clubbing or cyanosis to  extremities   Neurologic: Oriented x 3, strength symmetric in all extremities, Cranial Nerves grossly intact to confrontation, speech clear   Skin: No rashes     Result Review   Result Review:  I have personally reviewed the results from the time of this admission to 7/8/2025 11:29 EDT and agree with these findings:  []  Laboratory  []  Microbiology  []  Radiology  []  EKG/Telemetry   []  Cardiology/Vascular   []  Pathology  []  Old records  []  Other:        Imaging Results (Last 24 Hours)       ** No results found for the last 24 hours. **             heparin (porcine), 5,000 Units, Subcutaneous, Q8H  insulin lispro, 2-7 Units, Subcutaneous, 4x Daily AC & at Bedtime  sodium chloride, 10 mL, Intravenous, Q12H         Assessment & Plan  Assessment / Plan     Assessment/Plan:   Osteomyelitis of the left toe first distal, proximal phalanx and distal portion of the first metatarsal  Possible septic arthritis of the left first toe joint  Cellulitis of the left foot  Wound cultures positive for MSSA and Streptococcus  Peripheral neuropathy  Hyperlipidemia  Type II diabetes mellitus    Plan  Admit to inpatient, MedSurg  Started on ampicillin-sulbactam  Podiatry consult Dr. Whitehead  Follow-up on CBC, CMP, mag, Phos, lactic acid, blood cultures, repeat wound cultures  Heart healthy, consistent carb diet  Low-dose sliding scale insulin  Fall precautions  PT OT consult when appropriate  Resume other appropriate home medications once reconciled    VTE Prophylaxis:  Pharmacologic VTE prophylaxis orders are present.    CODE STATUS:    Code Status (Patient has no pulse and is not breathing): CPR (Attempt to Resuscitate)  Medical Interventions (Patient has pulse or is breathing): Full Support  Level Of Support Discussed With: Patient      Admission Status:  I believe this patient meets inpatient status.    Part of this note may be an electronic transcription/translation of spoken language to printed text using the Dragon  "Dictation System    Suman Chavez MD               Electronically signed by Suman Chavez MD at 07/08/25 1129       Emergency Department Notes    No notes of this type exist for this encounter.       Vital Signs (last day)       Date/Time Temp Temp src Pulse Resp BP Patient Position SpO2    07/08/25 1100 97.9 (36.6) Oral 60 16 114/67 Sitting 96          Lab Results (last 24 hours)       Procedure Component Value Units Date/Time    Procalcitonin [[id number]]  (Normal) Collected: 07/08/25 1154    Specimen: Blood from Hand, Left Updated: 07/08/25 1239     Procalcitonin 0.06 ng/mL     Narrative:      As a Marker for Sepsis (Non-Neonates):    1. <0.5 ng/mL represents a low risk of severe sepsis and/or septic shock.  2. >2 ng/mL represents a high risk of severe sepsis and/or septic shock.    As a Marker for Lower Respiratory Tract Infections that require antibiotic therapy:    PCT on Admission    Antibiotic Therapy       6-12 Hrs later    >0.5                Strongly Recommended  >0.25 - <0.5        Recommended  0.1 - 0.25          Discouraged              Remeasure/reassess PCT  <0.1                Strongly Discouraged     Remeasure/reassess PCT    As 28 day mortality risk marker: \"Change in Procalcitonin Result\" (>80% or <=80%) if Day 0 (or Day 1) and Day 4 values are available. Refer to http://www.[employer]-pct-calculator.com    Change in PCT <=80%  A decrease of PCT levels below or equal to 80% defines a positive change in PCT test result representing a higher risk for 28-day all-cause mortality of patients diagnosed with severe sepsis for septic shock.    Change in PCT >80%  A decrease of PCT levels of more than 80% defines a negative change in PCT result representing a lower risk for 28-day all-cause mortality of patients diagnosed with severe sepsis or septic shock.    This test is Prognostic not Diagnostic, if elevated correlate with clinical findings before administering antibiotic treatment.        " Comprehensive Metabolic Panel [[id number]]  (Abnormal) Collected: 07/08/25 1154    Specimen: Blood from Hand, Left Updated: 07/08/25 1233     Glucose 125 mg/dL      BUN 10.3 mg/dL      Creatinine 1.19 mg/dL      Sodium 130 mmol/L      Potassium 4.8 mmol/L      Chloride 97 mmol/L      CO2 18.4 mmol/L      Calcium 9.4 mg/dL      Total Protein 8.3 g/dL      Albumin 3.7 g/dL      ALT (SGPT) 9 U/L      AST (SGOT) 16 U/L      Alkaline Phosphatase 134 U/L      Total Bilirubin 0.5 mg/dL      Globulin 4.6 gm/dL      A/G Ratio 0.8 g/dL      BUN/Creatinine Ratio 8.7     Anion Gap 14.6 mmol/L      eGFR 68.2 mL/min/1.73     Narrative:      GFR Categories in Chronic Kidney Disease (CKD)              GFR Category          GFR (mL/min/1.73)    Interpretation  G1                    90 or greater        Normal or high (1)  G2                    60-89                Mild decrease (1)  G3a                   45-59                Mild to moderate decrease  G3b                   30-44                Moderate to severe decrease  G4                    15-29                Severe decrease  G5                    14 or less           Kidney failure    (1)In the absence of evidence of kidney disease, neither GFR category G1 or G2 fulfill the criteria for CKD.    eGFR calculation 2021 CKD-EPI creatinine equation, which does not include race as a factor    Magnesium [[id number]]  (Normal) Collected: 07/08/25 1154    Specimen: Blood from Hand, Left Updated: 07/08/25 1233     Magnesium 2.1 mg/dL     Phosphorus [[id number]]  (Normal) Collected: 07/08/25 1154    Specimen: Blood from Hand, Left Updated: 07/08/25 1233     Phosphorus 3.9 mg/dL     Lactic Acid, Plasma [[id number]]  (Normal) Collected: 07/08/25 1205    Specimen: Blood Updated: 07/08/25 1231     Lactate 1.6 mmol/L     Extra Tubes [[id number]] Collected: 07/08/25 1154    Specimen: Blood from Hand, Left Updated: 07/08/25 1216    Narrative:      The following orders were created for panel order  Extra Tubes.  Procedure                               Abnormality         Status                     ---------                               -----------         ------                     Lavender Top[[id number]]                                     Final result               Gold Top - SST[[id number]]                                   Final result               Green Top (Gel)[[id number]]                                  Final result                 Please view results for these tests on the individual orders.    Gold Top - SST [[id number]] Collected: 07/08/25 1154    Specimen: Blood from Hand, Left Updated: 07/08/25 1216     Extra Tube Hold for add-ons.     Comment: Auto resulted.       Green Top (Gel) [[id number]] Collected: 07/08/25 1154    Specimen: Blood from Hand, Left Updated: 07/08/25 1216     Extra Tube Hold for add-ons.     Comment: Auto resulted.       Lavender Top [[id number]] Collected: 07/08/25 1154    Specimen: Blood from Hand, Left Updated: 07/08/25 1216     Extra Tube hold for add-on     Comment: Auto resulted       CBC & Differential [[id number]]  (Abnormal) Collected: 07/08/25 1154    Specimen: Blood from Hand, Left Updated: 07/08/25 1210    Narrative:      The following orders were created for panel order CBC & Differential.  Procedure                               Abnormality         Status                     ---------                               -----------         ------                     CBC Auto Differential[[id number]]        Abnormal            Final result                 Please view results for these tests on the individual orders.    CBC Auto Differential [[id number]]  (Abnormal) Collected: 07/08/25 1154    Specimen: Blood from Hand, Left Updated: 07/08/25 1210     WBC 7.64 10*3/mm3      RBC 5.06 10*6/mm3      Hemoglobin 14.9 g/dL      Hematocrit 46.9 %      MCV 92.7 fL      MCH 29.4 pg      MCHC 31.8 g/dL      RDW 12.9 %      RDW-SD 43.8 fl      MPV 8.5 fL      Platelets 284 10*3/mm3       Neutrophil % 64.8 %      Lymphocyte % 21.5 %      Monocyte % 6.3 %      Eosinophil % 6.0 %      Basophil % 1.0 %      Immature Grans % 0.4 %      Neutrophils, Absolute 4.95 10*3/mm3      Lymphocytes, Absolute 1.64 10*3/mm3      Monocytes, Absolute 0.48 10*3/mm3      Eosinophils, Absolute 0.46 10*3/mm3      Basophils, Absolute 0.08 10*3/mm3      Immature Grans, Absolute 0.03 10*3/mm3      nRBC 0.0 /100 WBC     POC Glucose Once [[id number]]  (Abnormal) Collected: 07/08/25 1208    Specimen: Blood Updated: 07/08/25 1210     Glucose 122 mg/dL      Comment: Serial Number: [id number]:  [id number]       Blood Culture - Blood, Hand, Left [[id number]] Collected: 07/08/25 1154    Specimen: Blood from Hand, Left Updated: 07/08/25 1208    Blood Culture - Blood, Hand, Right [[id number]] Collected: 07/08/25 1154    Specimen: Blood from Hand, Right Updated: 07/08/25 1208    Wound Culture - Wound, Toe, Left [[id number]] Collected: 07/08/25 1148    Specimen: Wound from Toe, Left Updated: 07/08/25 1151          Imaging Results (Last 24 Hours)       ** No results found for the last 24 hours. **          ECG/EMG Results (last 24 hours)       ** No results found for the last 24 hours. **          Physician Progress Notes (last 24 hours)  Notes from 07/07/25 1300 through 07/08/25 1300   No notes of this type exist for this encounter.       Consult Notes (last 24 hours)  Notes from 07/07/25 1300 through 07/08/25 1300   No notes of this type exist for this encounter.

## 2025-07-08 NOTE — PLAN OF CARE
Goal Outcome Evaluation:  Plan of Care Reviewed With: patient        Progress: no change  Outcome Evaluation: Alert and oriented x4. Patient denies pain. 2 RN skin assessment completed. Wounds dressed per policy, wound care consulted. Blood glucose monitored per order. IV antibiotic given per mar.

## 2025-07-09 LAB
GLUCOSE BLDC GLUCOMTR-MCNC: 120 MG/DL (ref 70–99)
GLUCOSE BLDC GLUCOMTR-MCNC: 128 MG/DL (ref 70–99)
GLUCOSE BLDC GLUCOMTR-MCNC: 137 MG/DL (ref 70–99)
GLUCOSE BLDC GLUCOMTR-MCNC: 162 MG/DL (ref 70–99)

## 2025-07-09 PROCEDURE — 99221 1ST HOSP IP/OBS SF/LOW 40: CPT | Performed by: PODIATRIST

## 2025-07-09 PROCEDURE — 99233 SBSQ HOSP IP/OBS HIGH 50: CPT | Performed by: INTERNAL MEDICINE

## 2025-07-09 PROCEDURE — 25010000002 AMPICILLIN-SULBACTAM PER 1.5 G: Performed by: STUDENT IN AN ORGANIZED HEALTH CARE EDUCATION/TRAINING PROGRAM

## 2025-07-09 PROCEDURE — 25010000002 KETOROLAC TROMETHAMINE PER 15 MG: Performed by: STUDENT IN AN ORGANIZED HEALTH CARE EDUCATION/TRAINING PROGRAM

## 2025-07-09 PROCEDURE — 82948 REAGENT STRIP/BLOOD GLUCOSE: CPT | Performed by: STUDENT IN AN ORGANIZED HEALTH CARE EDUCATION/TRAINING PROGRAM

## 2025-07-09 PROCEDURE — 82948 REAGENT STRIP/BLOOD GLUCOSE: CPT

## 2025-07-09 PROCEDURE — 25010000002 KETOROLAC TROMETHAMINE PER 15 MG: Performed by: PHYSICIAN ASSISTANT

## 2025-07-09 RX ORDER — KETOROLAC TROMETHAMINE 30 MG/ML
15 INJECTION, SOLUTION INTRAMUSCULAR; INTRAVENOUS ONCE
Status: COMPLETED | OUTPATIENT
Start: 2025-07-09 | End: 2025-07-09

## 2025-07-09 RX ADMIN — Medication 10 ML: at 08:14

## 2025-07-09 RX ADMIN — AMPICILLIN SODIUM AND SULBACTAM SODIUM 3 G: 2; 1 INJECTION, POWDER, FOR SOLUTION INTRAMUSCULAR; INTRAVENOUS at 20:50

## 2025-07-09 RX ADMIN — KETOROLAC TROMETHAMINE 15 MG: 30 INJECTION, SOLUTION INTRAMUSCULAR; INTRAVENOUS at 08:13

## 2025-07-09 RX ADMIN — GABAPENTIN 300 MG: 300 CAPSULE ORAL at 20:50

## 2025-07-09 RX ADMIN — AMPICILLIN SODIUM AND SULBACTAM SODIUM 3 G: 2; 1 INJECTION, POWDER, FOR SOLUTION INTRAMUSCULAR; INTRAVENOUS at 01:22

## 2025-07-09 RX ADMIN — AMPICILLIN SODIUM AND SULBACTAM SODIUM 3 G: 2; 1 INJECTION, POWDER, FOR SOLUTION INTRAMUSCULAR; INTRAVENOUS at 13:21

## 2025-07-09 RX ADMIN — KETOROLAC TROMETHAMINE 15 MG: 30 INJECTION, SOLUTION INTRAMUSCULAR; INTRAVENOUS at 21:19

## 2025-07-09 RX ADMIN — Medication 10 ML: at 20:50

## 2025-07-09 RX ADMIN — AMPICILLIN SODIUM AND SULBACTAM SODIUM 3 G: 2; 1 INJECTION, POWDER, FOR SOLUTION INTRAMUSCULAR; INTRAVENOUS at 08:13

## 2025-07-09 RX ADMIN — GABAPENTIN 300 MG: 300 CAPSULE ORAL at 15:46

## 2025-07-09 RX ADMIN — GABAPENTIN 300 MG: 300 CAPSULE ORAL at 08:13

## 2025-07-09 NOTE — SIGNIFICANT NOTE
Will defer wound consult until patient is seen by D.P.M for assessment of patient wounds. Ruib Urban RN

## 2025-07-09 NOTE — PLAN OF CARE
Goal Outcome Evaluation:  Plan of Care Reviewed With: patient        Progress: improving  Outcome Evaluation: pt alert and oriented x4. IV abx done per alek. Podiatry rounded and provided wound care this shift, new orders received. pt to have surgical amputation on Friday per discussion with Dr. Moon. discussed refusal of preventative heparin therapy. pt and wife agreed to start therapy after amputation but not before, due to up ad shad and ambulatory. no new issues noted at this time.

## 2025-07-09 NOTE — PLAN OF CARE
Goal Outcome Evaluation:              Outcome Evaluation: Pt AOx4 throughout the shift, on room air, and up ad-shad. Pt had no complaints of pain or discomfort noted this shift. IV ABX maintained per orders. Blood glucose monitored per orders, no coverage needed. Wound dressings dry and intact, podiatry to consult today. VSS, no acute changes noted this shift. Pt appears to be in no apparent distress at this time, denies any current needs, and has call light within reach.

## 2025-07-09 NOTE — CONSULTS
07/09/25   Foot and Ankle Surgery - Consult  Provider: Troy Mono DPM  Location: [facility]    Subjective:  Idris Park is a 65 y.o. male.     No chief complaint on file.      Patient 65-year-old male presenting with an infected left great toe.  Patient states has been going on for about 6 7 months.  She he has seen several doctors for this and several podiatrist.  Patient says over the course last few weeks it blew up on him and it has gotten swollen.  He is here for further treatment.  He had an MRI that confirmed osteomyelitis.    Allergies   Allergen Reactions    Cephalexin Rash     Has tolerated Ampicillin/Sulbactam and Amoxicillin/clavulanate -Phillip Akbar PharmD       Past Medical History:   Diagnosis Date    Acromioclavicular separation 2004    Arthritis     COVID-19     Diabetes     Foot ulcer     Hammer toe     Hyperlipidemia     Knee swelling 2000    Limb pain     Limb swelling     Neuropathy in diabetes        Past Surgical History:   Procedure Laterality Date    DENTAL PROCEDURE      EXTERNAL EAR SURGERY Bilateral     HERNIA REPAIR  1967    also 2011    JOINT REPLACEMENT Left     Artificial Joints/Limbs    KNEE ARTHROPLASTY Right     OTHER SURGICAL HISTORY      JOINT SURGERY       Family History   Problem Relation Age of Onset    Cancer Mother     Breast cancer Mother 50       Social History     Socioeconomic History    Marital status:    Tobacco Use    Smoking status: Never     Passive exposure: Past    Smokeless tobacco: Never   Vaping Use    Vaping status: Never Used   Substance and Sexual Activity    Alcohol use: Not Currently     Comment: socially-BEER MONTHLY    Drug use: Never    Sexual activity: Yes     Partners: Female     Birth control/protection: None          Current Facility-Administered Medications:     acetaminophen (TYLENOL) tablet 650 mg, 650 mg, Oral, Q6H PRN, Suman Chavez MD    ampicillin-sulbactam (UNASYN) 3 g in sodium chloride 0.9 % 100 mL  IVPB-VTB, 3 g, Intravenous, Q6H, Suman Chavez MD, 3 g at 07/09/25 0813    sennosides-docusate (PERICOLACE) 8.6-50 MG per tablet 2 tablet, 2 tablet, Oral, BID PRN **AND** polyethylene glycol (MIRALAX) packet 17 g, 17 g, Oral, Daily PRN **AND** bisacodyl (DULCOLAX) EC tablet 5 mg, 5 mg, Oral, Daily PRN **AND** bisacodyl (DULCOLAX) suppository 10 mg, 10 mg, Rectal, Daily PRN, Suman Chavez MD    dextrose (D50W) (25 g/50 mL) IV injection 25 g, 25 g, Intravenous, Q15 Min PRN, Suman Chavez MD    dextrose (GLUTOSE) oral gel 15 g, 15 g, Oral, Q15 Min PRN, Suman Chavez MD    gabapentin (NEURONTIN) capsule 300 mg, 300 mg, Oral, TID, Suman Chavez MD, 300 mg at 07/09/25 0813    glucagon (GLUCAGEN) injection 1 mg, 1 mg, Intramuscular, Q15 Min PRN, Suman Chavez MD    heparin (porcine) 5000 UNIT/ML injection 5,000 Units, 5,000 Units, Subcutaneous, Q8H, Suman Chavez MD    Insulin Lispro (humaLOG) injection 2-7 Units, 2-7 Units, Subcutaneous, 4x Daily AC & at Bedtime, Suman Chavez MD    sodium chloride 0.9 % flush 10 mL, 10 mL, Intravenous, Q12H, Suman Chavez MD, 10 mL at 07/09/25 0814    sodium chloride 0.9 % flush 10 mL, 10 mL, Intravenous, PRN, Suman Chavez MD    sodium chloride 0.9 % infusion 40 mL, 40 mL, Intravenous, PRN, Suman Chavez MD    Review of Systems:  General: Denies fever, chills, fatigue, and weakness.  Eyes: Denies vision loss, blurry vision, and excessive redness.  ENT: Denies hearing issues and difficulty swallowing.  Cardiovascular: Denies palpitations, chest pain, or syncopal episodes.  Respiratory: Denies shortness of breath, wheezing, and coughing.  GI: Denies abdominal pain, nausea, and vomiting.   : Denies frequency, hematuria, and urgency.  Musculoskeletal: Denies muscle cramps, joint pains, and stiffness.  Derm: Denies rash, open wounds, or suspicious lesions.  Neuro: Denies headaches, numbness, loss of coordination, and  "tremors.  Psych: Denies anxiety and depression.  Endocrine: Denies temperature intolerance and changes in appetite.  Heme: Denies bleeding disorders or abnormal bruising.     Objective   /73 (BP Location: Left arm, Patient Position: Sitting)   Pulse 55   Temp 97.3 °F (36.3 °C) (Oral)   Resp 18   Ht 175.3 cm (69\")   Wt 107 kg (236 lb 1.8 oz)   SpO2 95%   BMI 34.87 kg/m²     Foot/Ankle Exam    GENERAL  Appearance:  appears stated age  Orientation:  AAOx3  Affect:  appropriate  Gait:  unimpaired  Assistance:  independent  Right shoe gear: casual shoe  Left shoe gear: casual shoe    VASCULAR     Right Foot Vascularity   Normal vascular exam    Dorsalis pedis:  2+  Posterior tibial:  2+  Skin temperature:  warm  Edema grading:  None  CFT:  < 3 seconds  Pedal hair growth:  Present  Varicosities:  none     Left Foot Vascularity   Normal vascular exam    Dorsalis pedis:  2+  Posterior tibial:  2+  Skin temperature:  warm  Edema grading:  None  CFT:  < 3 seconds  Pedal hair growth:  Present  Varicosities:  none     NEUROLOGIC     Right Foot Neurologic   Light touch sensation: absent  Vibratory sensation: absent  Hot/Cold sensation: absent     Left Foot Neurologic   Light touch sensation: absent  Vibratory sensation: absent  Hot/Cold sensation:  absent    MUSCLE STRENGTH     Right Foot Muscle Strength   Foot dorsiflexion:  4  Foot plantar flexion:  4  Foot inversion:  4  Foot eversion:  4     Left Foot Muscle Strength   Foot dorsiflexion:  4  Foot plantar flexion:  4  Foot inversion:  4  Foot eversion:  4    RANGE OF MOTION     Right Foot Range of Motion   Foot and ankle ROM within normal limits       Left Foot Range of Motion   Foot and ankle ROM within normal limits      DERMATOLOGIC      Right Foot Dermatologic   Skin  Right foot skin is intact.      Left Foot Dermatologic   Skin  Left foot skin is intact.      Left foot additional comments: Ulcer to dorsal IPJ of left great toe with swelling of the great toe. "  No malodor no drainage mild erythema.      Culture results from last 30 days   Lab 07/08/25  1154 07/08/25  1148 06/27/25  1209   WOUNDCX Culture in progress Culture in progress Light growth (2+) Streptococcus, Beta Hemolytic, Group G*  Moderate growth (3+) Staphylococcus aureus*       Results from last 7 days   Lab Units 07/08/25  1154   WBC 10*3/mm3 7.64   HEMOGLOBIN g/dL 14.9   HEMATOCRIT % 46.9   PLATELETS 10*3/mm3 284       Assessment & Plan     Active Hospital Problems    Diagnosis     **Osteomyelitis          - Patient for toe amputation, will schedule this Friday with    - Continue daily dressing changes and antibiotics   - Patient agreeable to plan     Thank you for the consultation and allowing me to participate in this patient's care. Please call with any additional questions or concerns.     Part of this note may be an electronic transcription/translation of spoken language to printed text using the Dragon Dictation System.

## 2025-07-09 NOTE — PROGRESS NOTES
[facility]   Hospitalist Progress Note  Date: 2025  Patient Name: Idris Park  : 1960  MRN: [id number]  Date of admission: 2025    Subjective   Subjective     Chief Complaint:   Left toe osteomyelitis    Summary:   Idris Park is a 65 y.o. male  with a past medical history of peripheral neuropathy, hyperlipidemia, diabetes mellitus sent to the hospital as a direct admit from wound care clinic for evaluation and management of left great toe wound infection.  Recent outpatient MRI showed osteomyelitis of the first distal phalanx, first proximal phalanx and distal portion of the first metatarsal, extensive cellulitis of the great toe.  15 x 11 x 10 mm abscess with fistulous tract extending to the dorsal surface of the first toe with evidence of resting of septic joint.  Marked soft tissue edema of the dorsum of the foot and within the intermuscular soft tissue suggests cellulitis versus venous edema.  Wound cultures from 2025 showed positive for Streptococcus and Staphylococcus aureus, susceptible to oxacillin.  Patient follows Dr. Whitehead and also seen by podiatrist at [facility] on 7/3/2025 and his toenail has been removed during that visit.     Interval Followup:   No acute events over the past 24 hours, patient tolerating antibiotics.  Patient awaiting evaluation by podiatry today    Objective   Objective     Vitals:   Temp:  [97.2 °F (36.2 °C)-97.9 °F (36.6 °C)] 97.3 °F (36.3 °C)  Heart Rate:  [52-57] 55  Resp:  [16-18] 18  BP: ()/() 117/73    Physical Exam   GEN: No acute distress  HEENT: Moist mucous membranes  LUNGS: Equal chest rise bilaterally  CARDIAC: Regular rate and rhythm  NEURO: Moving all 4 extremities spontaneously  SKIN: Erythema, ulceration, drainage of the left great toe    Result Review    Result Review:  I have personally reviewed the results as below  [x]  Laboratory CBC, CMP personally reviewed  CBC          2025    08:39 2025     11:15 7/8/2025    11:54   CBC   WBC 6.67  14.09  7.64    RBC 5.06  4.55  5.06    Hemoglobin 15.3  13.6  14.9    Hematocrit 46.5  41.3  46.9    MCV 91.9  90.8  92.7    MCH 30.2  29.9  29.4    MCHC 32.9  32.9  31.8    RDW 13.1  13.2  12.9    Platelets 211  214  284      CMP          5/30/2025    08:39 6/27/2025    11:15 7/8/2025    11:54   CMP   Glucose 144  158  125    BUN 17.0  12.5  10.3    Creatinine 1.26  1.11  1.19    EGFR 63.7  74.2  68.2    Sodium 139  132  130    Potassium 4.8  4.1  4.8    Chloride 102  97  97    Calcium 9.4  8.6  9.4    Total Protein 7.2  7.3  8.3    Albumin 4.3  4.1  3.7    Globulin 2.9  3.2  4.6    Total Bilirubin 0.8  1.1  0.5    Alkaline Phosphatase 113  103  134    AST (SGOT) 21  14  16    ALT (SGPT) 16  10  9    Albumin/Globulin Ratio 1.5  1.3  0.8    BUN/Creatinine Ratio 13.5  11.3  8.7    Anion Gap 10.2  11.2  14.6      []  Microbiology  []  Radiology  []  EKG/Telemetry   []  Cardiology/Vascular   []  Pathology  []  Old records  []  Other:    Scheduled medications:  ampicillin-sulbactam, 3 g, Intravenous, Q6H  gabapentin, 300 mg, Oral, TID  heparin (porcine), 5,000 Units, Subcutaneous, Q8H  insulin lispro, 2-7 Units, Subcutaneous, 4x Daily AC & at Bedtime  sodium chloride, 10 mL, Intravenous, Q12H      As needed medications:  •  acetaminophen  •  senna-docusate sodium **AND** polyethylene glycol **AND** bisacodyl **AND** bisacodyl  •  dextrose  •  dextrose  •  glucagon (human recombinant)  •  sodium chloride  •  sodium chloride  Infusions:          Assessment & Plan   Assessment / Plan     Assessment:  Left great toe osteomyelitis  Diabetic foot wound with cellulitis  Poorly controlled diabetes mellitus  Possible septic arthritis  Peripheral neuropathy  Type 2 diabetes mellitus    Plan:  Patient admitted to the hospital for further workup and management of above  Continue amp sulbactam based off prior culture and sensitivity data  Blood cultures ordered and pending  Podiatry  consulted, suspect patient will likely need amputation  Basal bolus insulin  Healthy heart carb consistent diet  Fall precautions  Wound care consult  CBC, CMP reviewed  Repeat CBC, CMP, mag and Phos in a.m.     Reviewed patient's labs and imaging, and discussed with patient and nurse at bedside.    VTE Prophylaxis:  Pharmacologic VTE prophylaxis orders are present.    Time spent: Time spent involving patient care including face-to-face encounter 51 minutes    CODE STATUS:   Code Status (Patient has no pulse and is not breathing): CPR (Attempt to Resuscitate)  Medical Interventions (Patient has pulse or is breathing): Full Support  Level Of Support Discussed With: Patient      Electronically signed by Jb Arevalo MD, 7/9/2025, 13:20 EDT.

## 2025-07-10 LAB
ALBUMIN SERPL-MCNC: 3.5 G/DL (ref 3.5–5.2)
ALBUMIN/GLOB SERPL: 0.9 G/DL
ALP SERPL-CCNC: 110 U/L (ref 39–117)
ALT SERPL W P-5'-P-CCNC: 10 U/L (ref 1–41)
ANION GAP SERPL CALCULATED.3IONS-SCNC: 10.5 MMOL/L (ref 5–15)
AST SERPL-CCNC: 12 U/L (ref 1–40)
BASOPHILS # BLD AUTO: 0.07 10*3/MM3 (ref 0–0.2)
BASOPHILS NFR BLD AUTO: 1 % (ref 0–1.5)
BILIRUB SERPL-MCNC: 0.4 MG/DL (ref 0–1.2)
BUN SERPL-MCNC: 15.4 MG/DL (ref 8–23)
BUN/CREAT SERPL: 13.2 (ref 7–25)
CALCIUM SPEC-SCNC: 9 MG/DL (ref 8.6–10.5)
CHLORIDE SERPL-SCNC: 98 MMOL/L (ref 98–107)
CO2 SERPL-SCNC: 23.5 MMOL/L (ref 22–29)
CREAT SERPL-MCNC: 1.17 MG/DL (ref 0.76–1.27)
DEPRECATED RDW RBC AUTO: 43.8 FL (ref 37–54)
EGFRCR SERPLBLD CKD-EPI 2021: 69.6 ML/MIN/1.73
EOSINOPHIL # BLD AUTO: 0.41 10*3/MM3 (ref 0–0.4)
EOSINOPHIL NFR BLD AUTO: 6.1 % (ref 0.3–6.2)
ERYTHROCYTE [DISTWIDTH] IN BLOOD BY AUTOMATED COUNT: 12.9 % (ref 12.3–15.4)
GLOBULIN UR ELPH-MCNC: 3.7 GM/DL
GLUCOSE BLDC GLUCOMTR-MCNC: 115 MG/DL (ref 70–99)
GLUCOSE BLDC GLUCOMTR-MCNC: 128 MG/DL (ref 70–99)
GLUCOSE BLDC GLUCOMTR-MCNC: 151 MG/DL (ref 70–99)
GLUCOSE BLDC GLUCOMTR-MCNC: 189 MG/DL (ref 70–99)
GLUCOSE SERPL-MCNC: 140 MG/DL (ref 65–99)
HCT VFR BLD AUTO: 42.8 % (ref 37.5–51)
HGB BLD-MCNC: 13.7 G/DL (ref 13–17.7)
IMM GRANULOCYTES # BLD AUTO: 0.02 10*3/MM3 (ref 0–0.05)
IMM GRANULOCYTES NFR BLD AUTO: 0.3 % (ref 0–0.5)
LYMPHOCYTES # BLD AUTO: 1.24 10*3/MM3 (ref 0.7–3.1)
LYMPHOCYTES NFR BLD AUTO: 18.6 % (ref 19.6–45.3)
MAGNESIUM SERPL-MCNC: 2.1 MG/DL (ref 1.6–2.4)
MCH RBC QN AUTO: 29.5 PG (ref 26.6–33)
MCHC RBC AUTO-ENTMCNC: 32 G/DL (ref 31.5–35.7)
MCV RBC AUTO: 92 FL (ref 79–97)
MONOCYTES # BLD AUTO: 0.47 10*3/MM3 (ref 0.1–0.9)
MONOCYTES NFR BLD AUTO: 7 % (ref 5–12)
NEUTROPHILS NFR BLD AUTO: 4.46 10*3/MM3 (ref 1.7–7)
NEUTROPHILS NFR BLD AUTO: 67 % (ref 42.7–76)
NRBC BLD AUTO-RTO: 0 /100 WBC (ref 0–0.2)
PHOSPHATE SERPL-MCNC: 4.8 MG/DL (ref 2.5–4.5)
PLATELET # BLD AUTO: 264 10*3/MM3 (ref 140–450)
PMV BLD AUTO: 8.8 FL (ref 6–12)
POTASSIUM SERPL-SCNC: 4.7 MMOL/L (ref 3.5–5.2)
PROT SERPL-MCNC: 7.2 G/DL (ref 6–8.5)
RBC # BLD AUTO: 4.65 10*6/MM3 (ref 4.14–5.8)
SODIUM SERPL-SCNC: 132 MMOL/L (ref 136–145)
WBC NRBC COR # BLD AUTO: 6.67 10*3/MM3 (ref 3.4–10.8)

## 2025-07-10 PROCEDURE — 80053 COMPREHEN METABOLIC PANEL: CPT | Performed by: INTERNAL MEDICINE

## 2025-07-10 PROCEDURE — 25010000002 HYALURONIDASE (HUMAN) 150 UNIT/ML SOLUTION 1 ML VIAL: Performed by: INTERNAL MEDICINE

## 2025-07-10 PROCEDURE — 25010000002 KETOROLAC TROMETHAMINE PER 15 MG: Performed by: INTERNAL MEDICINE

## 2025-07-10 PROCEDURE — 84100 ASSAY OF PHOSPHORUS: CPT | Performed by: INTERNAL MEDICINE

## 2025-07-10 PROCEDURE — 85025 COMPLETE CBC W/AUTO DIFF WBC: CPT | Performed by: INTERNAL MEDICINE

## 2025-07-10 PROCEDURE — 99232 SBSQ HOSP IP/OBS MODERATE 35: CPT | Performed by: INTERNAL MEDICINE

## 2025-07-10 PROCEDURE — 83735 ASSAY OF MAGNESIUM: CPT | Performed by: INTERNAL MEDICINE

## 2025-07-10 PROCEDURE — 25010000002 AMPICILLIN-SULBACTAM PER 1.5 G: Performed by: STUDENT IN AN ORGANIZED HEALTH CARE EDUCATION/TRAINING PROGRAM

## 2025-07-10 PROCEDURE — 82948 REAGENT STRIP/BLOOD GLUCOSE: CPT

## 2025-07-10 PROCEDURE — 99232 SBSQ HOSP IP/OBS MODERATE 35: CPT | Performed by: PODIATRIST

## 2025-07-10 PROCEDURE — 82948 REAGENT STRIP/BLOOD GLUCOSE: CPT | Performed by: STUDENT IN AN ORGANIZED HEALTH CARE EDUCATION/TRAINING PROGRAM

## 2025-07-10 RX ORDER — KETOROLAC TROMETHAMINE 30 MG/ML
15 INJECTION, SOLUTION INTRAMUSCULAR; INTRAVENOUS EVERY 6 HOURS PRN
Status: DISPENSED | OUTPATIENT
Start: 2025-07-10 | End: 2025-07-13

## 2025-07-10 RX ADMIN — Medication 10 ML: at 20:50

## 2025-07-10 RX ADMIN — AMPICILLIN SODIUM AND SULBACTAM SODIUM 3 G: 2; 1 INJECTION, POWDER, FOR SOLUTION INTRAMUSCULAR; INTRAVENOUS at 02:18

## 2025-07-10 RX ADMIN — GABAPENTIN 300 MG: 300 CAPSULE ORAL at 08:57

## 2025-07-10 RX ADMIN — Medication 10 ML: at 08:57

## 2025-07-10 RX ADMIN — AMPICILLIN SODIUM AND SULBACTAM SODIUM 3 G: 2; 1 INJECTION, POWDER, FOR SOLUTION INTRAMUSCULAR; INTRAVENOUS at 08:57

## 2025-07-10 RX ADMIN — AMPICILLIN SODIUM AND SULBACTAM SODIUM 3 G: 2; 1 INJECTION, POWDER, FOR SOLUTION INTRAMUSCULAR; INTRAVENOUS at 14:12

## 2025-07-10 RX ADMIN — GABAPENTIN 300 MG: 300 CAPSULE ORAL at 15:15

## 2025-07-10 RX ADMIN — KETOROLAC TROMETHAMINE 15 MG: 30 INJECTION, SOLUTION INTRAMUSCULAR; INTRAVENOUS at 12:12

## 2025-07-10 RX ADMIN — HYALURONIDASE (HUMAN RECOMBINANT) 150 UNITS: 150 INJECTION, SOLUTION SUBCUTANEOUS at 23:12

## 2025-07-10 RX ADMIN — GABAPENTIN 300 MG: 300 CAPSULE ORAL at 20:49

## 2025-07-10 NOTE — PLAN OF CARE
Goal Outcome Evaluation:   Pt AAOx4 able to make wants and needs known, up ad-shad. no complaints of pain or discomfort noted this shift. IV ABX maintained per orders. Blood glucose monitored per orders, no coverage needed. Wound dressings dry and intact,  VSS, no acute changes noted this shift. call light within reach.

## 2025-07-10 NOTE — PLAN OF CARE
Goal Outcome Evaluation:  Plan of Care Reviewed With: patient        Progress: improving  Outcome Evaluation: pt alert and oriented x4. prn tramadol given for pain. blood glucose monitored, no coverage needed. skin/wound care done per order. pt will be NPO at midnight 7/11 for left great toe amputation in the am. up ad shad. discussed continued refusal of heparin. no new issues noted at this time.

## 2025-07-10 NOTE — PROGRESS NOTES
[facility]   Hospitalist Progress Note  Date: 7/10/2025  Patient Name: Idris Park  : 1960  MRN: [id number]  Date of admission: 2025    Subjective   Subjective     Chief Complaint:   Left toe osteomyelitis    Summary:   Idris Park is a 65 y.o. male  with a past medical history of peripheral neuropathy, hyperlipidemia, diabetes mellitus sent to the hospital as a direct admit from wound care clinic for evaluation and management of left great toe wound infection.  Recent outpatient MRI showed osteomyelitis of the first distal phalanx, first proximal phalanx and distal portion of the first metatarsal, extensive cellulitis of the great toe.  15 x 11 x 10 mm abscess with fistulous tract extending to the dorsal surface of the first toe with evidence of resting of septic joint.  Marked soft tissue edema of the dorsum of the foot and within the intermuscular soft tissue suggests cellulitis versus venous edema.  Wound cultures from 2025 showed positive for Streptococcus and Staphylococcus aureus, susceptible to oxacillin.  Patient follows Dr. Whitehead and also seen by podiatrist at [facility] on 7/3/2025 and his toenail has been removed during that visit.     Interval Followup:   No acute events over the past 24 hours, patient tolerating antibiotics well    Objective   Objective     Vitals:   Temp:  [97.4 °F (36.3 °C)-98.2 °F (36.8 °C)] 97.7 °F (36.5 °C)  Heart Rate:  [50-70] 70  Resp:  [18] 18  BP: ()/(51-78) 107/77    Physical Exam   GEN: No acute distress, sitting up in chair  HEENT: Moist mucous membranes  LUNGS: Equal chest rise bilaterally  CARDIAC: Regular rate and rhythm  NEURO: Moving all 4 extremities spontaneously  SKIN: Erythema, ulceration, drainage of the left great toe, wrapped    Result Review    Result Review:  I have personally reviewed the results as below  [x]  Laboratory CBC, CMP personally reviewed  CBC          2025    11:15 2025    11:54 7/10/2025     04:21   CBC   WBC 14.09  7.64  6.67    RBC 4.55  5.06  4.65    Hemoglobin 13.6  14.9  13.7    Hematocrit 41.3  46.9  42.8    MCV 90.8  92.7  92.0    MCH 29.9  29.4  29.5    MCHC 32.9  31.8  32.0    RDW 13.2  12.9  12.9    Platelets 214  284  264      CMP          6/27/2025    11:15 7/8/2025    11:54 7/10/2025    04:21   CMP   Glucose 158  125  140    BUN 12.5  10.3  15.4    Creatinine 1.11  1.19  1.17    EGFR 74.2  68.2  69.6    Sodium 132  130  132    Potassium 4.1  4.8  4.7    Chloride 97  97  98    Calcium 8.6  9.4  9.0    Total Protein 7.3  8.3  7.2    Albumin 4.1  3.7  3.5    Globulin 3.2  4.6  3.7    Total Bilirubin 1.1  0.5  0.4    Alkaline Phosphatase 103  134  110    AST (SGOT) 14  16  12    ALT (SGPT) 10  9  10    Albumin/Globulin Ratio 1.3  0.8  0.9    BUN/Creatinine Ratio 11.3  8.7  13.2    Anion Gap 11.2  14.6  10.5      []  Microbiology  []  Radiology  []  EKG/Telemetry   []  Cardiology/Vascular   []  Pathology  []  Old records  []  Other:    Scheduled medications:  ampicillin-sulbactam, 3 g, Intravenous, Q6H  gabapentin, 300 mg, Oral, TID  heparin (porcine), 5,000 Units, Subcutaneous, Q8H  insulin lispro, 2-7 Units, Subcutaneous, 4x Daily AC & at Bedtime  sodium chloride, 10 mL, Intravenous, Q12H      As needed medications:  •  acetaminophen  •  senna-docusate sodium **AND** polyethylene glycol **AND** bisacodyl **AND** bisacodyl  •  dextrose  •  dextrose  •  glucagon (human recombinant)  •  ketorolac  •  sodium chloride  •  sodium chloride  Infusions:          Assessment & Plan   Assessment / Plan     Assessment:  Left great toe osteomyelitis  Diabetic foot wound with cellulitis  Poorly controlled diabetes mellitus  Possible septic arthritis  Peripheral neuropathy  Type 2 diabetes mellitus    Plan:  Patient admitted to the hospital for further workup and management of above  Continue amp sulbactam based off prior culture and sensitivity data  Blood cultures ordered no growth to date  Podiatry  consulted, suspect patient will likely need amputation, planning on Friday  Continue basal bolus insulin  Healthy heart carb consistent diet  Fall precautions  Wound care consult  CBC, CMP reviewed 7/10/2025   Repeat CBC, CMP, mag and Phos in a.m. 7/10/2025      Reviewed patient's labs and imaging, and discussed with patient and nurse at bedside.    VTE Prophylaxis:  Pharmacologic VTE prophylaxis orders are present.      CODE STATUS:   Code Status (Patient has no pulse and is not breathing): CPR (Attempt to Resuscitate)  Medical Interventions (Patient has pulse or is breathing): Full Support  Level Of Support Discussed With: Patient      Electronically signed by Jb Arevalo MD, 7/10/2025, 13:39 EDT.

## 2025-07-10 NOTE — PROGRESS NOTES
[facility] - PODIATRY    Today's Date: 07/10/25    Patient Name: Idris Park  MRN: [id number]  CSN: [id number]  PCP: Dae Yan APRN  Referring Provider: Elke Ford MD  Attending Provider: Jb Arevalo MD  Length of Stay: 2    SUBJECTIVE   Chief Complaint: Osteomyelitis of left great toe    HPI: Idris Park, a 65 y.o.male,     Patient in good spirits.  Patient was agreeable to the left great toe amputation.    Past Medical History:   Diagnosis Date   • Acromioclavicular separation 2004   • Arthritis    • COVID-19    • Diabetes    • Foot ulcer    • Hammer toe    • Hyperlipidemia    • Knee swelling 2000   • Limb pain    • Limb swelling    • Neuropathy in diabetes      Past Surgical History:   Procedure Laterality Date   • DENTAL PROCEDURE     • EXTERNAL EAR SURGERY Bilateral    • HERNIA REPAIR  1967    also 2011   • JOINT REPLACEMENT Left     Artificial Joints/Limbs   • KNEE ARTHROPLASTY Right    • OTHER SURGICAL HISTORY      JOINT SURGERY     Family History   Problem Relation Age of Onset   • Cancer Mother    • Breast cancer Mother 50     Social History     Socioeconomic History   • Marital status:    Tobacco Use   • Smoking status: Never     Passive exposure: Past   • Smokeless tobacco: Never   Vaping Use   • Vaping status: Never Used   Substance and Sexual Activity   • Alcohol use: Not Currently     Comment: socially-BEER MONTHLY   • Drug use: Never   • Sexual activity: Yes     Partners: Female     Birth control/protection: None     Allergies   Allergen Reactions   • Cephalexin Rash     Has tolerated Ampicillin/Sulbactam and Amoxicillin/clavulanate -Phillip Akbar PharmD     Current Facility-Administered Medications   Medication Dose Route Frequency Provider Last Rate Last Admin   • acetaminophen (TYLENOL) tablet 650 mg  650 mg Oral Q6H PRN Suman Chavez MD       • ampicillin-sulbactam (UNASYN) 3 g in sodium chloride 0.9 % 100 mL IVPB-VTB  3 g Intravenous Q6H  Suman Chavez MD 0 mL/hr at 07/09/25 2151 3 g at 07/10/25 0857   • sennosides-docusate (PERICOLACE) 8.6-50 MG per tablet 2 tablet  2 tablet Oral BID PRN Suman Chavez MD        And   • polyethylene glycol (MIRALAX) packet 17 g  17 g Oral Daily PRN Suman Chavez MD        And   • bisacodyl (DULCOLAX) EC tablet 5 mg  5 mg Oral Daily PRN Suman Chavez MD        And   • bisacodyl (DULCOLAX) suppository 10 mg  10 mg Rectal Daily PRN Suman Chavez MD       • dextrose (D50W) (25 g/50 mL) IV injection 25 g  25 g Intravenous Q15 Min PRN Suman Chavez MD       • dextrose (GLUTOSE) oral gel 15 g  15 g Oral Q15 Min PRN Suman Chavez MD       • gabapentin (NEURONTIN) capsule 300 mg  300 mg Oral TID Suman Chavez MD   300 mg at 07/10/25 0857   • glucagon (GLUCAGEN) injection 1 mg  1 mg Intramuscular Q15 Min PRN Suman Chavez MD       • heparin (porcine) 5000 UNIT/ML injection 5,000 Units  5,000 Units Subcutaneous Q8H Suman Chavez MD       • Insulin Lispro (humaLOG) injection 2-7 Units  2-7 Units Subcutaneous 4x Daily AC & at Bedtime Suman Chavez MD       • sodium chloride 0.9 % flush 10 mL  10 mL Intravenous Q12H Suman Chavez MD   10 mL at 07/10/25 0857   • sodium chloride 0.9 % flush 10 mL  10 mL Intravenous PRN Suman Chavez MD       • sodium chloride 0.9 % infusion 40 mL  40 mL Intravenous PRN Suman Chavez MD           OBJECTIVE     Vitals:    07/10/25 0721   BP: 118/76   Pulse: 56   Resp: 18   Temp: 97.5 °F (36.4 °C)   SpO2: 98%       PHYSICAL EXAM    GEN:   A&Ox3, NAD. Pt presents in hospital bed. Accompanied by the patient's nurse.    Neurovascular Status:  unchanged  Musculoskelatal:  Status:  unchanged  Derm Status: Unchanged.  Draining ulcer on dorsal left hallux.      RADIOLOGY/NUCLEAR:  MRI Foot Left With & Without Contrast  Result Date: 6/30/2025  Narrative: MRI FOOT LEFT W WO CONTRAST Date of Exam: 6/30/2025 7:08 PM EDT  Indication: Probable osteomyelitis in Left Hallux and First Metatarsal shaft.  Comparison: Radiograph dated June 27 Technique:  Routine multiplanar/multisequence sequence images of the left foot were obtained before and after the uneventful administration of Multihance.  Findings: There is extensive edema in the superficial soft tissues of the dorsum of the foot as well as in the plantar surface of the foot There is extensive intramuscular edema in the interosseous musculature of the foot. There are moderate degenerative changes in the midfoot with subchondral chondral edema and prominent spurring noted. The first toe demonstrates abnormal signal with extensive edema in the distal phalanx, proximal phalanx and distal portion of the first metatarsal In addition there are extensive subchondral cysts in the metatarsal head. There appears to be a fistulous tract extending from the distal portion of the first proximal phalanx and first interphalangeal joint with the tract communicating with the skin surface of the dorsum of the foot at this site There is an irregular enhancing fluid collection dorsal surface of the first toe distally in extending somewhat medial to the first distal phalanx This collection also appears to extend to the skin surface with perhaps a draining fistulous tract. This collection measures approximately 10 x 11 x 15 mm in size.     Impression: Impression: Osteomyelitis of the first distal phalanx, first proximal phalanx, and distal portion of the first metatarsal. Extensive cellulitis of the great toe 15 x 11 x 10 mm abscess with fistulous tracts extending to the dorsal surface of the first toe with evidence suggesting of septic joint. Marked soft tissue edema of the dorsum of the foot and within the intermuscular soft tissues suggests cellulitis versus venous edema. Electronically Signed: Rogerio Arnold MD  6/30/2025 9:07 PM EDT  Workstation ID: [id number]    XR Foot 3+ View Left  Result Date:  6/27/2025  Narrative: XR FOOT 3+ VW LEFT Date of Exam: 6/27/2025 11:56 AM EDT Indication: wound Comparison: Left foot 8/23/2024 Findings: Evaluation of the left foot demonstrates extensive dorsal and first digit soft tissue swelling. No definite cortical disruption noted involving the first proximal phalanx or distal phalanx. However, there is lucency within the head of the first metatarsal bone which may be related to prior fracture and bone resorption however additional infection not entirely excluded. There is joint space narrowing at the first DIP joint. Additional findings include degenerative changes in the midfoot with dorsal osteophyte. Plantar calcaneal spur is noted.     Impression: Impression: Lucency within the head of the first metatarsal bone some of which could be related to prior trauma however there is extensive soft tissue swelling involving the first digit and dorsal margin of the foot and underlying osteomyelitis not excluded. Further  evaluation with MRI recommended for better evaluation. Electronically Signed: Keyona Carlson MD  6/27/2025 12:19 PM EDT  Workstation ID: [id number]      LABORATORY/CULTURE RESULTS:  Results from last 7 days   Lab Units 07/10/25  0421 07/08/25  1154   WBC 10*3/mm3 6.67 7.64   HEMOGLOBIN g/dL 13.7 14.9   HEMATOCRIT % 42.8 46.9   PLATELETS 10*3/mm3 264 284     Results from last 7 days   Lab Units 07/10/25  0421 07/08/25  1154   SODIUM mmol/L 132* 130*   POTASSIUM mmol/L 4.7 4.8   CHLORIDE mmol/L 98 97*   CO2 mmol/L 23.5 18.4*   BUN mg/dL 15.4 10.3   CREATININE mg/dL 1.17 1.19   CALCIUM mg/dL 9.0 9.4   BILIRUBIN mg/dL 0.4 0.5   ALK PHOS U/L 110 134*   ALT (SGPT) U/L 10 9   AST (SGOT) U/L 12 16   GLUCOSE mg/dL 140* 125*         Microbiology Results (last 10 days)       Procedure Component Value - Date/Time    Wound Culture - Wound, Toe, Left [[id number]]  (Abnormal) Collected: 07/08/25 1154    Lab Status: Preliminary result Specimen: Wound from Toe, Left Updated:  07/10/25 0942     Wound Culture Rare growth Staphylococcus aureus      Rare growth Normal Skin Catie     Gram Stain Moderate (3+) WBCs seen      Few (2+) Gram positive cocci in pairs    Blood Culture - Blood, Hand, Left [[id number]]  (Normal) Collected: 07/08/25 1154    Lab Status: Preliminary result Specimen: Blood from Hand, Left Updated: 07/09/25 1217     Blood Culture No growth at 24 hours    Blood Culture - Blood, Hand, Right [[id number]]  (Normal) Collected: 07/08/25 1154    Lab Status: Preliminary result Specimen: Blood from Hand, Right Updated: 07/09/25 1217     Blood Culture No growth at 24 hours    Wound Culture - Wound, Toe, Left [[id number]]  (Abnormal) Collected: 07/08/25 1148    Lab Status: Preliminary result Specimen: Wound from Toe, Left Updated: 07/10/25 0942     Wound Culture Rare growth Staphylococcus aureus      Rare growth Normal Skin Catie     Gram Stain Few (2+) WBCs seen      No organisms seen             ASSESSMENT/PLAN     Active Hospital Problems:  Active Hospital Problems    Diagnosis    • **Osteomyelitis        Comprehensive lower extremity examination and evaluation was performed.    Discussed findings and treatment plan including risks, benefits, and treatment options with patient in detail. Patient agreed with treatment plan.    Planned surgery: Left hallux amputation on 11 July 2025.    Discussed with the patient at length surgical versus nonsurgical options.  Explained to the patient in detail that surgical intervention is only indicated when the level of pain is such that it negatively affects her daily life.    It was explained to the patient that surgical interventions often times do not relieve all of the pain associated with the deformity    Risks and complications associated with surgical versus nonsurgical options were explained.  The patient understands that the problem will most likely continue to evolve and surgical intervention is the only treatment plan that actually  corrects the deformities.    Upon discussion of non-surgical conservative option, surgical correction, post-operative requirements along with risk and benefits of the surgery along with expected outcomes, the patient states they would like to proceed with the scheduling surgery.      The patient has decided to move forward with surgical intervention understanding all of these risks and complications.    Lab Frequency Next Occurrence   CBC & Differential Once 12/13/2025   Comprehensive Metabolic Panel Once 12/13/2025   Hemoglobin A1c Once 12/13/2025   Urinalysis With Culture If Indicated - Once 12/13/2025   Lipid Panel Once 12/13/2025   Microalbumin / Creatinine Urine Ratio - Urine, Clean Catch Once 12/13/2025   WOUND CARE HYPERBARIC Once 07/10/2025       This document has been electronically signed by Ervin Whitehead DPM on July 10, 2025 11:23 EDT

## 2025-07-10 NOTE — H&P (VIEW-ONLY)
[facility] - PODIATRY    Today's Date: 07/10/25    Patient Name: Idris Park  MRN: [id number]  CSN: [id number]  PCP: Dae Yan APRN  Referring Provider: Elke Ford MD  Attending Provider: Jb Arevalo MD  Length of Stay: 2    SUBJECTIVE   Chief Complaint: Osteomyelitis of left great toe    HPI: Idris Park, a 65 y.o.male,     Patient in good spirits.  Patient was agreeable to the left great toe amputation.    Past Medical History:   Diagnosis Date   • Acromioclavicular separation 2004   • Arthritis    • COVID-19    • Diabetes    • Foot ulcer    • Hammer toe    • Hyperlipidemia    • Knee swelling 2000   • Limb pain    • Limb swelling    • Neuropathy in diabetes      Past Surgical History:   Procedure Laterality Date   • DENTAL PROCEDURE     • EXTERNAL EAR SURGERY Bilateral    • HERNIA REPAIR  1967    also 2011   • JOINT REPLACEMENT Left     Artificial Joints/Limbs   • KNEE ARTHROPLASTY Right    • OTHER SURGICAL HISTORY      JOINT SURGERY     Family History   Problem Relation Age of Onset   • Cancer Mother    • Breast cancer Mother 50     Social History     Socioeconomic History   • Marital status:    Tobacco Use   • Smoking status: Never     Passive exposure: Past   • Smokeless tobacco: Never   Vaping Use   • Vaping status: Never Used   Substance and Sexual Activity   • Alcohol use: Not Currently     Comment: socially-BEER MONTHLY   • Drug use: Never   • Sexual activity: Yes     Partners: Female     Birth control/protection: None     Allergies   Allergen Reactions   • Cephalexin Rash     Has tolerated Ampicillin/Sulbactam and Amoxicillin/clavulanate -Phillip Akbar PharmD     Current Facility-Administered Medications   Medication Dose Route Frequency Provider Last Rate Last Admin   • acetaminophen (TYLENOL) tablet 650 mg  650 mg Oral Q6H PRN Suman Chavez MD       • ampicillin-sulbactam (UNASYN) 3 g in sodium chloride 0.9 % 100 mL IVPB-VTB  3 g Intravenous Q6H  Suman Chavez MD 0 mL/hr at 07/09/25 2151 3 g at 07/10/25 0857   • sennosides-docusate (PERICOLACE) 8.6-50 MG per tablet 2 tablet  2 tablet Oral BID PRN Suman Chavez MD        And   • polyethylene glycol (MIRALAX) packet 17 g  17 g Oral Daily PRN Suman Chavez MD        And   • bisacodyl (DULCOLAX) EC tablet 5 mg  5 mg Oral Daily PRN Suman Chavez MD        And   • bisacodyl (DULCOLAX) suppository 10 mg  10 mg Rectal Daily PRN Suman Chavez MD       • dextrose (D50W) (25 g/50 mL) IV injection 25 g  25 g Intravenous Q15 Min PRN Suman Chavez MD       • dextrose (GLUTOSE) oral gel 15 g  15 g Oral Q15 Min PRN Suman Chavez MD       • gabapentin (NEURONTIN) capsule 300 mg  300 mg Oral TID Suman Chavez MD   300 mg at 07/10/25 0857   • glucagon (GLUCAGEN) injection 1 mg  1 mg Intramuscular Q15 Min PRN Suman Chavez MD       • heparin (porcine) 5000 UNIT/ML injection 5,000 Units  5,000 Units Subcutaneous Q8H Suman Chavez MD       • Insulin Lispro (humaLOG) injection 2-7 Units  2-7 Units Subcutaneous 4x Daily AC & at Bedtime Suman Chavez MD       • sodium chloride 0.9 % flush 10 mL  10 mL Intravenous Q12H Suman Chavez MD   10 mL at 07/10/25 0857   • sodium chloride 0.9 % flush 10 mL  10 mL Intravenous PRN Suman Chavez MD       • sodium chloride 0.9 % infusion 40 mL  40 mL Intravenous PRN Suman Chavez MD           OBJECTIVE     Vitals:    07/10/25 0721   BP: 118/76   Pulse: 56   Resp: 18   Temp: 97.5 °F (36.4 °C)   SpO2: 98%       PHYSICAL EXAM    GEN:   A&Ox3, NAD. Pt presents in hospital bed. Accompanied by the patient's nurse.    Neurovascular Status:  unchanged  Musculoskelatal:  Status:  unchanged  Derm Status: Unchanged.  Draining ulcer on dorsal left hallux.      RADIOLOGY/NUCLEAR:  MRI Foot Left With & Without Contrast  Result Date: 6/30/2025  Narrative: MRI FOOT LEFT W WO CONTRAST Date of Exam: 6/30/2025 7:08 PM EDT  Indication: Probable osteomyelitis in Left Hallux and First Metatarsal shaft.  Comparison: Radiograph dated June 27 Technique:  Routine multiplanar/multisequence sequence images of the left foot were obtained before and after the uneventful administration of Multihance.  Findings: There is extensive edema in the superficial soft tissues of the dorsum of the foot as well as in the plantar surface of the foot There is extensive intramuscular edema in the interosseous musculature of the foot. There are moderate degenerative changes in the midfoot with subchondral chondral edema and prominent spurring noted. The first toe demonstrates abnormal signal with extensive edema in the distal phalanx, proximal phalanx and distal portion of the first metatarsal In addition there are extensive subchondral cysts in the metatarsal head. There appears to be a fistulous tract extending from the distal portion of the first proximal phalanx and first interphalangeal joint with the tract communicating with the skin surface of the dorsum of the foot at this site There is an irregular enhancing fluid collection dorsal surface of the first toe distally in extending somewhat medial to the first distal phalanx This collection also appears to extend to the skin surface with perhaps a draining fistulous tract. This collection measures approximately 10 x 11 x 15 mm in size.     Impression: Impression: Osteomyelitis of the first distal phalanx, first proximal phalanx, and distal portion of the first metatarsal. Extensive cellulitis of the great toe 15 x 11 x 10 mm abscess with fistulous tracts extending to the dorsal surface of the first toe with evidence suggesting of septic joint. Marked soft tissue edema of the dorsum of the foot and within the intermuscular soft tissues suggests cellulitis versus venous edema. Electronically Signed: Rogerio Arnold MD  6/30/2025 9:07 PM EDT  Workstation ID: [id number]    XR Foot 3+ View Left  Result Date:  6/27/2025  Narrative: XR FOOT 3+ VW LEFT Date of Exam: 6/27/2025 11:56 AM EDT Indication: wound Comparison: Left foot 8/23/2024 Findings: Evaluation of the left foot demonstrates extensive dorsal and first digit soft tissue swelling. No definite cortical disruption noted involving the first proximal phalanx or distal phalanx. However, there is lucency within the head of the first metatarsal bone which may be related to prior fracture and bone resorption however additional infection not entirely excluded. There is joint space narrowing at the first DIP joint. Additional findings include degenerative changes in the midfoot with dorsal osteophyte. Plantar calcaneal spur is noted.     Impression: Impression: Lucency within the head of the first metatarsal bone some of which could be related to prior trauma however there is extensive soft tissue swelling involving the first digit and dorsal margin of the foot and underlying osteomyelitis not excluded. Further  evaluation with MRI recommended for better evaluation. Electronically Signed: Keyona Carlson MD  6/27/2025 12:19 PM EDT  Workstation ID: [id number]      LABORATORY/CULTURE RESULTS:  Results from last 7 days   Lab Units 07/10/25  0421 07/08/25  1154   WBC 10*3/mm3 6.67 7.64   HEMOGLOBIN g/dL 13.7 14.9   HEMATOCRIT % 42.8 46.9   PLATELETS 10*3/mm3 264 284     Results from last 7 days   Lab Units 07/10/25  0421 07/08/25  1154   SODIUM mmol/L 132* 130*   POTASSIUM mmol/L 4.7 4.8   CHLORIDE mmol/L 98 97*   CO2 mmol/L 23.5 18.4*   BUN mg/dL 15.4 10.3   CREATININE mg/dL 1.17 1.19   CALCIUM mg/dL 9.0 9.4   BILIRUBIN mg/dL 0.4 0.5   ALK PHOS U/L 110 134*   ALT (SGPT) U/L 10 9   AST (SGOT) U/L 12 16   GLUCOSE mg/dL 140* 125*         Microbiology Results (last 10 days)       Procedure Component Value - Date/Time    Wound Culture - Wound, Toe, Left [[id number]]  (Abnormal) Collected: 07/08/25 1154    Lab Status: Preliminary result Specimen: Wound from Toe, Left Updated:  07/10/25 0942     Wound Culture Rare growth Staphylococcus aureus      Rare growth Normal Skin Catie     Gram Stain Moderate (3+) WBCs seen      Few (2+) Gram positive cocci in pairs    Blood Culture - Blood, Hand, Left [[id number]]  (Normal) Collected: 07/08/25 1154    Lab Status: Preliminary result Specimen: Blood from Hand, Left Updated: 07/09/25 1217     Blood Culture No growth at 24 hours    Blood Culture - Blood, Hand, Right [[id number]]  (Normal) Collected: 07/08/25 1154    Lab Status: Preliminary result Specimen: Blood from Hand, Right Updated: 07/09/25 1217     Blood Culture No growth at 24 hours    Wound Culture - Wound, Toe, Left [[id number]]  (Abnormal) Collected: 07/08/25 1148    Lab Status: Preliminary result Specimen: Wound from Toe, Left Updated: 07/10/25 0942     Wound Culture Rare growth Staphylococcus aureus      Rare growth Normal Skin Catie     Gram Stain Few (2+) WBCs seen      No organisms seen             ASSESSMENT/PLAN     Active Hospital Problems:  Active Hospital Problems    Diagnosis    • **Osteomyelitis        Comprehensive lower extremity examination and evaluation was performed.    Discussed findings and treatment plan including risks, benefits, and treatment options with patient in detail. Patient agreed with treatment plan.    Planned surgery: Left hallux amputation on 11 July 2025.    Discussed with the patient at length surgical versus nonsurgical options.  Explained to the patient in detail that surgical intervention is only indicated when the level of pain is such that it negatively affects her daily life.    It was explained to the patient that surgical interventions often times do not relieve all of the pain associated with the deformity    Risks and complications associated with surgical versus nonsurgical options were explained.  The patient understands that the problem will most likely continue to evolve and surgical intervention is the only treatment plan that actually  corrects the deformities.    Upon discussion of non-surgical conservative option, surgical correction, post-operative requirements along with risk and benefits of the surgery along with expected outcomes, the patient states they would like to proceed with the scheduling surgery.      The patient has decided to move forward with surgical intervention understanding all of these risks and complications.    Lab Frequency Next Occurrence   CBC & Differential Once 12/13/2025   Comprehensive Metabolic Panel Once 12/13/2025   Hemoglobin A1c Once 12/13/2025   Urinalysis With Culture If Indicated - Once 12/13/2025   Lipid Panel Once 12/13/2025   Microalbumin / Creatinine Urine Ratio - Urine, Clean Catch Once 12/13/2025   WOUND CARE HYPERBARIC Once 07/10/2025       This document has been electronically signed by Ervin Whitehead DPM on July 10, 2025 11:23 EDT

## 2025-07-11 ENCOUNTER — ANESTHESIA (OUTPATIENT)
Dept: PERIOP | Facility: HOSPITAL | Age: 65
End: 2025-07-11
Payer: COMMERCIAL

## 2025-07-11 ENCOUNTER — ANESTHESIA EVENT (OUTPATIENT)
Dept: PERIOP | Facility: HOSPITAL | Age: 65
End: 2025-07-11
Payer: COMMERCIAL

## 2025-07-11 ENCOUNTER — APPOINTMENT (OUTPATIENT)
Dept: GENERAL RADIOLOGY | Facility: HOSPITAL | Age: 65
End: 2025-07-11
Payer: COMMERCIAL

## 2025-07-11 LAB
ALBUMIN SERPL-MCNC: 3.7 G/DL (ref 3.5–5.2)
ALBUMIN/GLOB SERPL: 1 G/DL
ALP SERPL-CCNC: 105 U/L (ref 39–117)
ALT SERPL W P-5'-P-CCNC: 11 U/L (ref 1–41)
ANION GAP SERPL CALCULATED.3IONS-SCNC: 12.2 MMOL/L (ref 5–15)
AST SERPL-CCNC: 14 U/L (ref 1–40)
BACTERIA SPEC AEROBE CULT: ABNORMAL
BASOPHILS # BLD AUTO: 0.05 10*3/MM3 (ref 0–0.2)
BASOPHILS NFR BLD AUTO: 0.7 % (ref 0–1.5)
BILIRUB SERPL-MCNC: 0.5 MG/DL (ref 0–1.2)
BUN SERPL-MCNC: 18.5 MG/DL (ref 8–23)
BUN/CREAT SERPL: 15.9 (ref 7–25)
CALCIUM SPEC-SCNC: 8.9 MG/DL (ref 8.6–10.5)
CHLORIDE SERPL-SCNC: 100 MMOL/L (ref 98–107)
CO2 SERPL-SCNC: 22.8 MMOL/L (ref 22–29)
CREAT SERPL-MCNC: 1.16 MG/DL (ref 0.76–1.27)
DEPRECATED RDW RBC AUTO: 42.5 FL (ref 37–54)
EGFRCR SERPLBLD CKD-EPI 2021: 70.3 ML/MIN/1.73
EOSINOPHIL # BLD AUTO: 0.3 10*3/MM3 (ref 0–0.4)
EOSINOPHIL NFR BLD AUTO: 4.1 % (ref 0.3–6.2)
ERYTHROCYTE [DISTWIDTH] IN BLOOD BY AUTOMATED COUNT: 13 % (ref 12.3–15.4)
GLOBULIN UR ELPH-MCNC: 3.7 GM/DL
GLUCOSE BLDC GLUCOMTR-MCNC: 126 MG/DL (ref 70–99)
GLUCOSE BLDC GLUCOMTR-MCNC: 129 MG/DL (ref 70–99)
GLUCOSE BLDC GLUCOMTR-MCNC: 137 MG/DL (ref 70–99)
GLUCOSE BLDC GLUCOMTR-MCNC: 144 MG/DL (ref 70–99)
GLUCOSE SERPL-MCNC: 145 MG/DL (ref 65–99)
GRAM STN SPEC: ABNORMAL
HCT VFR BLD AUTO: 42.1 % (ref 37.5–51)
HGB BLD-MCNC: 13.5 G/DL (ref 13–17.7)
IMM GRANULOCYTES # BLD AUTO: 0.02 10*3/MM3 (ref 0–0.05)
IMM GRANULOCYTES NFR BLD AUTO: 0.3 % (ref 0–0.5)
LYMPHOCYTES # BLD AUTO: 1.36 10*3/MM3 (ref 0.7–3.1)
LYMPHOCYTES NFR BLD AUTO: 18.8 % (ref 19.6–45.3)
MAGNESIUM SERPL-MCNC: 2.1 MG/DL (ref 1.6–2.4)
MCH RBC QN AUTO: 29 PG (ref 26.6–33)
MCHC RBC AUTO-ENTMCNC: 32.1 G/DL (ref 31.5–35.7)
MCV RBC AUTO: 90.5 FL (ref 79–97)
MONOCYTES # BLD AUTO: 0.54 10*3/MM3 (ref 0.1–0.9)
MONOCYTES NFR BLD AUTO: 7.5 % (ref 5–12)
NEUTROPHILS NFR BLD AUTO: 4.97 10*3/MM3 (ref 1.7–7)
NEUTROPHILS NFR BLD AUTO: 68.6 % (ref 42.7–76)
NRBC BLD AUTO-RTO: 0 /100 WBC (ref 0–0.2)
PHOSPHATE SERPL-MCNC: 4.6 MG/DL (ref 2.5–4.5)
PLATELET # BLD AUTO: 269 10*3/MM3 (ref 140–450)
PMV BLD AUTO: 8.8 FL (ref 6–12)
POTASSIUM SERPL-SCNC: 4.7 MMOL/L (ref 3.5–5.2)
PROT SERPL-MCNC: 7.4 G/DL (ref 6–8.5)
RBC # BLD AUTO: 4.65 10*6/MM3 (ref 4.14–5.8)
SODIUM SERPL-SCNC: 135 MMOL/L (ref 136–145)
WBC NRBC COR # BLD AUTO: 7.24 10*3/MM3 (ref 3.4–10.8)

## 2025-07-11 PROCEDURE — 25810000003 LACTATED RINGERS PER 1000 ML: Performed by: ANESTHESIOLOGY

## 2025-07-11 PROCEDURE — 87077 CULTURE AEROBIC IDENTIFY: CPT | Performed by: PODIATRIST

## 2025-07-11 PROCEDURE — 25010000002 AMPICILLIN-SULBACTAM PER 1.5 G: Performed by: STUDENT IN AN ORGANIZED HEALTH CARE EDUCATION/TRAINING PROGRAM

## 2025-07-11 PROCEDURE — 85025 COMPLETE CBC W/AUTO DIFF WBC: CPT | Performed by: INTERNAL MEDICINE

## 2025-07-11 PROCEDURE — 0Y9N0ZZ DRAINAGE OF LEFT FOOT, OPEN APPROACH: ICD-10-PCS | Performed by: PODIATRIST

## 2025-07-11 PROCEDURE — 25010000002 FENTANYL CITRATE (PF) 50 MCG/ML SOLUTION: Performed by: NURSE ANESTHETIST, CERTIFIED REGISTERED

## 2025-07-11 PROCEDURE — 25010000002 KETOROLAC TROMETHAMINE PER 15 MG: Performed by: PODIATRIST

## 2025-07-11 PROCEDURE — 73620 X-RAY EXAM OF FOOT: CPT

## 2025-07-11 PROCEDURE — 88305 TISSUE EXAM BY PATHOLOGIST: CPT | Performed by: PODIATRIST

## 2025-07-11 PROCEDURE — 80053 COMPREHEN METABOLIC PANEL: CPT | Performed by: INTERNAL MEDICINE

## 2025-07-11 PROCEDURE — 25010000002 HEPARIN (PORCINE) PER 1000 UNITS: Performed by: PODIATRIST

## 2025-07-11 PROCEDURE — 82948 REAGENT STRIP/BLOOD GLUCOSE: CPT | Performed by: NURSE ANESTHETIST, CERTIFIED REGISTERED

## 2025-07-11 PROCEDURE — 0Y6Q0Z0 DETACHMENT AT LEFT 1ST TOE, COMPLETE, OPEN APPROACH: ICD-10-PCS | Performed by: PODIATRIST

## 2025-07-11 PROCEDURE — 83735 ASSAY OF MAGNESIUM: CPT | Performed by: INTERNAL MEDICINE

## 2025-07-11 PROCEDURE — 25010000002 BUPIVACAINE (PF) 0.25 % SOLUTION: Performed by: PODIATRIST

## 2025-07-11 PROCEDURE — 99232 SBSQ HOSP IP/OBS MODERATE 35: CPT | Performed by: INTERNAL MEDICINE

## 2025-07-11 PROCEDURE — 82948 REAGENT STRIP/BLOOD GLUCOSE: CPT

## 2025-07-11 PROCEDURE — 87205 SMEAR GRAM STAIN: CPT | Performed by: PODIATRIST

## 2025-07-11 PROCEDURE — 25010000002 PROPOFOL 10 MG/ML EMULSION: Performed by: NURSE ANESTHETIST, CERTIFIED REGISTERED

## 2025-07-11 PROCEDURE — 94799 UNLISTED PULMONARY SVC/PX: CPT

## 2025-07-11 PROCEDURE — 25010000002 LIDOCAINE PF 2% 2 % SOLUTION: Performed by: NURSE ANESTHETIST, CERTIFIED REGISTERED

## 2025-07-11 PROCEDURE — 87070 CULTURE OTHR SPECIMN AEROBIC: CPT | Performed by: PODIATRIST

## 2025-07-11 PROCEDURE — 88311 DECALCIFY TISSUE: CPT | Performed by: PODIATRIST

## 2025-07-11 PROCEDURE — 87071 CULTURE AEROBIC QUANT OTHER: CPT | Performed by: PODIATRIST

## 2025-07-11 PROCEDURE — 84100 ASSAY OF PHOSPHORUS: CPT | Performed by: INTERNAL MEDICINE

## 2025-07-11 PROCEDURE — 87186 SC STD MICRODIL/AGAR DIL: CPT | Performed by: PODIATRIST

## 2025-07-11 PROCEDURE — 87176 TISSUE HOMOGENIZATION CULTR: CPT | Performed by: PODIATRIST

## 2025-07-11 PROCEDURE — 87075 CULTR BACTERIA EXCEPT BLOOD: CPT | Performed by: PODIATRIST

## 2025-07-11 PROCEDURE — 28820 AMPUTATION OF TOE: CPT | Performed by: PODIATRIST

## 2025-07-11 PROCEDURE — 25010000002 MIDAZOLAM PER 1MG: Performed by: ANESTHESIOLOGY

## 2025-07-11 RX ORDER — ACETAMINOPHEN 500 MG
1000 TABLET ORAL ONCE
Status: COMPLETED | OUTPATIENT
Start: 2025-07-11 | End: 2025-07-11

## 2025-07-11 RX ORDER — SODIUM CHLORIDE, SODIUM LACTATE, POTASSIUM CHLORIDE, CALCIUM CHLORIDE 600; 310; 30; 20 MG/100ML; MG/100ML; MG/100ML; MG/100ML
9 INJECTION, SOLUTION INTRAVENOUS CONTINUOUS PRN
Status: DISCONTINUED | OUTPATIENT
Start: 2025-07-11 | End: 2025-07-11 | Stop reason: HOSPADM

## 2025-07-11 RX ORDER — PROPOFOL 10 MG/ML
VIAL (ML) INTRAVENOUS AS NEEDED
Status: DISCONTINUED | OUTPATIENT
Start: 2025-07-11 | End: 2025-07-11 | Stop reason: SURG

## 2025-07-11 RX ORDER — FENTANYL CITRATE 50 UG/ML
INJECTION, SOLUTION INTRAMUSCULAR; INTRAVENOUS AS NEEDED
Status: DISCONTINUED | OUTPATIENT
Start: 2025-07-11 | End: 2025-07-11 | Stop reason: SURG

## 2025-07-11 RX ORDER — OXYCODONE HYDROCHLORIDE 5 MG/1
5 TABLET ORAL
Status: DISCONTINUED | OUTPATIENT
Start: 2025-07-11 | End: 2025-07-11

## 2025-07-11 RX ORDER — MAGNESIUM HYDROXIDE 1200 MG/15ML
LIQUID ORAL AS NEEDED
Status: DISCONTINUED | OUTPATIENT
Start: 2025-07-11 | End: 2025-07-11 | Stop reason: HOSPADM

## 2025-07-11 RX ORDER — ONDANSETRON 2 MG/ML
4 INJECTION INTRAMUSCULAR; INTRAVENOUS ONCE AS NEEDED
Status: DISCONTINUED | OUTPATIENT
Start: 2025-07-11 | End: 2025-07-11

## 2025-07-11 RX ORDER — PROMETHAZINE HYDROCHLORIDE 12.5 MG/1
25 TABLET ORAL ONCE AS NEEDED
Status: DISCONTINUED | OUTPATIENT
Start: 2025-07-11 | End: 2025-07-11

## 2025-07-11 RX ORDER — MIDAZOLAM HYDROCHLORIDE 2 MG/2ML
2 INJECTION, SOLUTION INTRAMUSCULAR; INTRAVENOUS ONCE
Status: COMPLETED | OUTPATIENT
Start: 2025-07-11 | End: 2025-07-11

## 2025-07-11 RX ORDER — FENTANYL CITRATE 50 UG/ML
50 INJECTION, SOLUTION INTRAMUSCULAR; INTRAVENOUS
Status: DISCONTINUED | OUTPATIENT
Start: 2025-07-11 | End: 2025-07-11

## 2025-07-11 RX ORDER — LIDOCAINE HYDROCHLORIDE 20 MG/ML
INJECTION, SOLUTION EPIDURAL; INFILTRATION; INTRACAUDAL; PERINEURAL AS NEEDED
Status: DISCONTINUED | OUTPATIENT
Start: 2025-07-11 | End: 2025-07-11 | Stop reason: SURG

## 2025-07-11 RX ORDER — BUPIVACAINE HCL/0.9 % NACL/PF 0.125 %
PLASTIC BAG, INJECTION (ML) EPIDURAL AS NEEDED
Status: DISCONTINUED | OUTPATIENT
Start: 2025-07-11 | End: 2025-07-11 | Stop reason: SURG

## 2025-07-11 RX ORDER — PROMETHAZINE HYDROCHLORIDE 25 MG/1
25 SUPPOSITORY RECTAL ONCE AS NEEDED
Status: DISCONTINUED | OUTPATIENT
Start: 2025-07-11 | End: 2025-07-11

## 2025-07-11 RX ORDER — BUPIVACAINE HYDROCHLORIDE 2.5 MG/ML
INJECTION, SOLUTION EPIDURAL; INFILTRATION; INTRACAUDAL; PERINEURAL AS NEEDED
Status: DISCONTINUED | OUTPATIENT
Start: 2025-07-11 | End: 2025-07-11 | Stop reason: HOSPADM

## 2025-07-11 RX ORDER — EPHEDRINE SULFATE 50 MG/ML
INJECTION INTRAVENOUS AS NEEDED
Status: DISCONTINUED | OUTPATIENT
Start: 2025-07-11 | End: 2025-07-11 | Stop reason: SURG

## 2025-07-11 RX ADMIN — EPHEDRINE SULFATE 5 MG: 50 INJECTION INTRAVENOUS at 09:15

## 2025-07-11 RX ADMIN — AMPICILLIN SODIUM AND SULBACTAM SODIUM 3 G: 2; 1 INJECTION, POWDER, FOR SOLUTION INTRAMUSCULAR; INTRAVENOUS at 14:21

## 2025-07-11 RX ADMIN — LIDOCAINE HYDROCHLORIDE 60 MG: 20 INJECTION, SOLUTION INTRAVENOUS at 08:52

## 2025-07-11 RX ADMIN — Medication 100 MCG: at 09:15

## 2025-07-11 RX ADMIN — Medication 100 MCG: at 09:22

## 2025-07-11 RX ADMIN — HEPARIN SODIUM 5000 UNITS: 5000 INJECTION INTRAVENOUS; SUBCUTANEOUS at 20:51

## 2025-07-11 RX ADMIN — AMPICILLIN SODIUM AND SULBACTAM SODIUM 3 G: 2; 1 INJECTION, POWDER, FOR SOLUTION INTRAMUSCULAR; INTRAVENOUS at 09:07

## 2025-07-11 RX ADMIN — OXYCODONE HYDROCHLORIDE 5 MG: 5 TABLET ORAL at 09:54

## 2025-07-11 RX ADMIN — EPHEDRINE SULFATE 10 MG: 50 INJECTION INTRAVENOUS at 09:07

## 2025-07-11 RX ADMIN — AMPICILLIN SODIUM AND SULBACTAM SODIUM 3 G: 2; 1 INJECTION, POWDER, FOR SOLUTION INTRAMUSCULAR; INTRAVENOUS at 03:11

## 2025-07-11 RX ADMIN — GABAPENTIN 300 MG: 300 CAPSULE ORAL at 20:51

## 2025-07-11 RX ADMIN — EPHEDRINE SULFATE 10 MG: 50 INJECTION INTRAVENOUS at 09:30

## 2025-07-11 RX ADMIN — Medication 200 MCG: at 09:09

## 2025-07-11 RX ADMIN — GABAPENTIN 300 MG: 300 CAPSULE ORAL at 11:21

## 2025-07-11 RX ADMIN — KETOROLAC TROMETHAMINE 15 MG: 30 INJECTION, SOLUTION INTRAMUSCULAR; INTRAVENOUS at 11:21

## 2025-07-11 RX ADMIN — HEPARIN SODIUM 5000 UNITS: 5000 INJECTION INTRAVENOUS; SUBCUTANEOUS at 14:21

## 2025-07-11 RX ADMIN — ACETAMINOPHEN 1000 MG: 500 TABLET ORAL at 07:41

## 2025-07-11 RX ADMIN — MIDAZOLAM HYDROCHLORIDE 2 MG: 1 INJECTION, SOLUTION INTRAMUSCULAR; INTRAVENOUS at 08:20

## 2025-07-11 RX ADMIN — AMPICILLIN SODIUM AND SULBACTAM SODIUM 3 G: 2; 1 INJECTION, POWDER, FOR SOLUTION INTRAMUSCULAR; INTRAVENOUS at 20:51

## 2025-07-11 RX ADMIN — PROPOFOL 175 MCG/KG/MIN: 10 INJECTION, EMULSION INTRAVENOUS at 08:52

## 2025-07-11 RX ADMIN — Medication 10 ML: at 20:51

## 2025-07-11 RX ADMIN — EPHEDRINE SULFATE 10 MG: 50 INJECTION INTRAVENOUS at 09:00

## 2025-07-11 RX ADMIN — FENTANYL CITRATE 50 MCG: 50 INJECTION, SOLUTION INTRAMUSCULAR; INTRAVENOUS at 09:23

## 2025-07-11 RX ADMIN — FENTANYL CITRATE 50 MCG: 50 INJECTION, SOLUTION INTRAMUSCULAR; INTRAVENOUS at 08:49

## 2025-07-11 RX ADMIN — SODIUM CHLORIDE, POTASSIUM CHLORIDE, SODIUM LACTATE AND CALCIUM CHLORIDE 9 ML/HR: 600; 310; 30; 20 INJECTION, SOLUTION INTRAVENOUS at 07:37

## 2025-07-11 RX ADMIN — EPHEDRINE SULFATE 10 MG: 50 INJECTION INTRAVENOUS at 09:04

## 2025-07-11 RX ADMIN — PROPOFOL 80 MG: 10 INJECTION, EMULSION INTRAVENOUS at 08:52

## 2025-07-11 RX ADMIN — EPHEDRINE SULFATE 5 MG: 50 INJECTION INTRAVENOUS at 09:22

## 2025-07-11 RX ADMIN — GABAPENTIN 300 MG: 300 CAPSULE ORAL at 17:09

## 2025-07-11 RX ADMIN — Medication 200 MCG: at 09:30

## 2025-07-11 NOTE — ANESTHESIA POSTPROCEDURE EVALUATION
Patient: Idris Park    Procedure Summary       Date: 07/11/25 Room / Location: [facility]  / [facility]    Anesthesia Start: 0847 Anesthesia Stop: 0938    Procedures:       LEFT GREAT TOE AMPUTATION (Left)      INCISION AND DRAINAGE FOOT BURSAL SPACE (Left) Diagnosis:       Acute hematogenous osteomyelitis, unspecified site      (Acute hematogenous osteomyelitis, unspecified site [M86.00])    Surgeons: Ervin Whitehead DPM Provider: Chemo Maldonado MD    Anesthesia Type: general, MAC ASA Status: 3            Anesthesia Type: general, MAC    Vitals  Vitals Value Taken Time   /72 07/11/25 10:02   Temp 36.3 °C (97.3 °F) 07/11/25 09:35   Pulse 67 07/11/25 10:06   Resp 16 07/11/25 09:35   SpO2 96 % 07/11/25 10:06   Vitals shown include unfiled device data.        Post Anesthesia Care and Evaluation    Patient location during evaluation: bedside  Patient participation: complete - patient participated  Level of consciousness: awake    Airway patency: patent  PONV Status: none  Cardiovascular status: acceptable  Respiratory status: acceptable  Hydration status: acceptable

## 2025-07-11 NOTE — SIGNIFICANT NOTE
Wound Eval / Progress Noted    ABDULKADIR Norris     Patient Name: Idris Park  : 1960  MRN: [id number]  Today's Date: 2025                 Admit Date: 2025    Visit Dx:    ICD-10-CM ICD-9-CM   1. Acute hematogenous osteomyelitis, unspecified site  M86.00 730.00         Osteomyelitis        Past Medical History:   Diagnosis Date    Acromioclavicular separation     Arthritis     COVID-19     Diabetes     Foot ulcer     Hammer toe     Hyperlipidemia     Knee swelling 2000    Limb pain     Limb swelling     Neuropathy in diabetes         Past Surgical History:   Procedure Laterality Date    DENTAL PROCEDURE      EXTERNAL EAR SURGERY Bilateral     HERNIA REPAIR  1967    also     JOINT REPLACEMENT Left     Artificial Joints/Limbs    KNEE ARTHROPLASTY Right     OTHER SURGICAL HISTORY      JOINT SURGERY         Physical Assessment:  Wound 24 1347 Right great toe (Active)   Dressing Appearance open to air 25 1121   Closure None 25 1030   Base red;nonblanchable;scab 25 1121   Periwound intact;dry;pink 25 1030   Periwound Temperature warm 25 1030   Periwound Skin Turgor soft 25 1030   Edges rolled/closed 25 1030   Drainage Amount none 25 1030   Care, Wound cleansed with;sterile normal saline 25 1030   Dressing Care open to air 25 1030     Wound Check / Follow-up:  Patient was seen today for a wound consult. Patient is awake, alert and oriented at the time of the assessment; he was agreeable to the visit. Patient is s/p left great toe amputation.    Existing ulceration present to the right plantar aspect of the great toe. Wound base with dry crusted tissue present. Periwound is soft and intact. Recommending to paint the tissue daily with betadine and leave open to air.     Surgical dressing is clean dry and intact.    Impression: right plantar great toe wound    Short term goals:  regain skin integrity, skin protection, topical treatment,  pressure reduction    Rubi Urban RN    7/11/2025    13:44 EDT

## 2025-07-11 NOTE — PROGRESS NOTES
[facility]   Hospitalist Progress Note  Date: 2025  Patient Name: Idris Park  : 1960  MRN: [id number]  Date of admission: 2025    Subjective   Subjective     Chief Complaint:   Left toe osteomyelitis    Summary:   Idris Park is a 65 y.o. male  with a past medical history of peripheral neuropathy, hyperlipidemia, diabetes mellitus sent to the hospital as a direct admit from wound care clinic for evaluation and management of left great toe wound infection.  Recent outpatient MRI showed osteomyelitis of the first distal phalanx, first proximal phalanx and distal portion of the first metatarsal, extensive cellulitis of the great toe.  15 x 11 x 10 mm abscess with fistulous tract extending to the dorsal surface of the first toe with evidence of resting of septic joint.  Marked soft tissue edema of the dorsum of the foot and within the intermuscular soft tissue suggests cellulitis versus venous edema.  Wound cultures from 2025 showed positive for Streptococcus and Staphylococcus aureus, susceptible to oxacillin.  Patient follows Dr. Whitehead and also seen by podiatrist at [facility] on 7/3/2025 and his toenail has been removed during that visit.     Interval Followup:   Over the past 24 hours patient went to surgery for great toe amputation, patient tolerated the procedure well without any immediate postoperative complications    Objective   Objective     Vitals:   Temp:  [97.2 °F (36.2 °C)-98.1 °F (36.7 °C)] 97.3 °F (36.3 °C)  Heart Rate:  [58-90] 62  Resp:  [16-18] 18  BP: ()/(55-81) 93/60  Flow (L/min) (Oxygen Therapy):  [6] 6    Physical Exam   GEN: No acute distress, sitting up in chair  HEENT: Moist mucous membranes  LUNGS: Equal chest rise bilaterally  CARDIAC: Regular rate and rhythm  NEURO: Moving all 4 extremities spontaneously  SKIN: Surgical dressing in place following toe amputation    Result Review    Result Review:  I have personally reviewed the results  as below  [x]  Laboratory CBC, CMP personally reviewed  CBC          7/8/2025    11:54 7/10/2025    04:21 7/11/2025    04:17   CBC   WBC 7.64  6.67  7.24    RBC 5.06  4.65  4.65    Hemoglobin 14.9  13.7  13.5    Hematocrit 46.9  42.8  42.1    MCV 92.7  92.0  90.5    MCH 29.4  29.5  29.0    MCHC 31.8  32.0  32.1    RDW 12.9  12.9  13.0    Platelets 284  264  269      CMP          7/8/2025    11:54 7/10/2025    04:21 7/11/2025    04:17   CMP   Glucose 125  140  145    BUN 10.3  15.4  18.5    Creatinine 1.19  1.17  1.16    EGFR 68.2  69.6  70.3    Sodium 130  132  135    Potassium 4.8  4.7  4.7    Chloride 97  98  100    Calcium 9.4  9.0  8.9    Total Protein 8.3  7.2  7.4    Albumin 3.7  3.5  3.7    Globulin 4.6  3.7  3.7    Total Bilirubin 0.5  0.4  0.5    Alkaline Phosphatase 134  110  105    AST (SGOT) 16  12  14    ALT (SGPT) 9  10  11    Albumin/Globulin Ratio 0.8  0.9  1.0    BUN/Creatinine Ratio 8.7  13.2  15.9    Anion Gap 14.6  10.5  12.2      []  Microbiology  []  Radiology  []  EKG/Telemetry   []  Cardiology/Vascular   []  Pathology  []  Old records  []  Other:    Scheduled medications:  ampicillin-sulbactam, 3 g, Intravenous, Q6H  gabapentin, 300 mg, Oral, TID  heparin (porcine), 5,000 Units, Subcutaneous, Q8H  insulin lispro, 2-7 Units, Subcutaneous, 4x Daily AC & at Bedtime  sodium chloride, 10 mL, Intravenous, Q12H      As needed medications:    acetaminophen    senna-docusate sodium **AND** polyethylene glycol **AND** bisacodyl **AND** bisacodyl    dextrose    dextrose    glucagon (human recombinant)    ketorolac    sodium chloride    sodium chloride  Infusions:          Assessment & Plan   Assessment / Plan     Assessment:  Left great toe osteomyelitis  Diabetic foot wound with cellulitis  Poorly controlled diabetes mellitus  Possible septic arthritis  Peripheral neuropathy  Type 2 diabetes mellitus    Plan:  Patient admitted to the hospital for further workup and management of above  Continue amp  sulbactam based off prior culture and sensitivity data  Blood cultures ordered no growth to date  Podiatry consulted, status post amputation on 7/11/2025  Continue basal bolus insulin  Healthy heart carb consistent diet  Fall precautions  Wound care consult  Continue postoperative pain control  PT/OT  CBC, CMP reviewed 7/11/2025   Repeat CBC, CMP, mag and Phos in a.m. 7/11/2025      Reviewed patient's labs and imaging, and discussed with patient and nurse at bedside.    VTE Prophylaxis:  Pharmacologic VTE prophylaxis orders are present.      CODE STATUS:   Code Status (Patient has no pulse and is not breathing): CPR (Attempt to Resuscitate)  Medical Interventions (Patient has pulse or is breathing): Full Support  Level Of Support Discussed With: Patient      Electronically signed by Jb Arevalo MD, 7/11/2025, 12:34 EDT.

## 2025-07-11 NOTE — OP NOTE
PREOPERATIVE DIAGNOSES:  Left great toe osteomyelitis  Left great toe abscess     PROCEDURES PERFORMED:  Left great toe amputation at the metatarsal phalangeal joint  Incision and drainage of abscess on left first ray down to bone    ANESTHESIA:  Local with MAC     HEMOSTASIS:   None     ESTIMATED BLOOD LOSS:  1 mL.     SPECIMENS:  Soft tissue and bone from left great toe     DRAINS:  None     COMPLICATIONS:  None.     IMPLANTS:  None     SUTURES:  3-0 nylon     INJECTABLES:  10 mL of 0.25% Marcaine plain     DESCRIPTION OF PROCEDURE:  Written consent was obtained from the patient prior to any medications or sedation being administered.     Under mild sedation, the patient was brought to the operating room and placed on the operating table in the supine position.  A well padded calf tourniquet was placed about the patient's calf on the surgical limb.    Following IV anesthesia, local anesthesia was obtained around the distal left first ray utilizing a total of 10 mL of quarter percent Marcaine plain.      The foot was then scrubbed, prepped and draped in the usual aseptic manner.  Tourniquet was not utilized throughout the case     Attention was directed to the left great toe.      Fluctuant ulcerative area was seen in the dorsal aspect of the left great toe extending back to the metatarsal area.  This area was continued down to the level of the bone.  This area was incised and drained.  A deep wound culture was taken.      Next a fishmouth incision was made immediately distal to the metatarsal phalangeal joint.      Care was taken to identify and retract all vital and neurovascular structures.  All bleeders were ligated and cauterized as necessary.     The incision was deepened to the metatarsal phalangeal joint which was disarticulated with crown and collar scissors.    The bone and soft tissue of the left great toe were removed in toto and sent for pathologic identification.    The wound was flushed with copious  amounts of sterile normal saline.    Inspection of the incision and drainage site and amputation site shows no residual areas of abscess and or infected bone.     The skin edges were approximated and coapted utilizing 3-0 nylon.    Upon completion of the procedure, capillary fill time is seen to the surgical site of the left great toe amputation site and the 2 through 5 toes of the left foot.       The surgical site was dressed with a sterile compressive dressings consisting of Adaptic, 4 x 4's, Kerlix and 4 inch Ace wrap.     The patient tolerated the procedure and anesthesia well. The patient was transferred to the recovery room with vital signs stable and vascular status intact the 2 through 5 toes of the left foot.      Following a period of postoperative monitoring, the patient will be discharged home, having been given written and oral postoperative instructions. The patient is to contact Dr. Whitehead for postoperative followup care and if any problems should arise.

## 2025-07-11 NOTE — PLAN OF CARE
Goal Outcome Evaluation:   Assumed care of patient at approx 2300 this shift d/t staffing redivide. A&ox4. Up ad shad. On room air. New IV placed by RRT nurse this shift. NPO since midnight for expected procedure today.

## 2025-07-11 NOTE — ANESTHESIA PREPROCEDURE EVALUATION
Anesthesia Evaluation     Patient summary reviewed and Nursing notes reviewed                Airway   Mallampati: II  TM distance: >3 FB  Neck ROM: full  No difficulty expected and Large neck circumference  Dental    (+) poor dentition        Pulmonary - negative pulmonary ROS and normal exam    breath sounds clear to auscultation  Cardiovascular - normal exam    Rhythm: regular  Rate: normal    (+) hyperlipidemia      Neuro/Psych- negative ROS  GI/Hepatic/Renal/Endo    (+) obesity, diabetes mellitus    Musculoskeletal     Abdominal    Substance History - negative use     OB/GYN negative ob/gyn ROS         Other   arthritis,                   Anesthesia Plan    ASA 3     general and MAC     intravenous induction     Anesthetic plan, risks, benefits, and alternatives have been provided, discussed and informed consent has been obtained with: patient.    CODE STATUS:    Code Status (Patient has no pulse and is not breathing): CPR (Attempt to Resuscitate)  Medical Interventions (Patient has pulse or is breathing): Full Support  Level Of Support Discussed With: Patient

## 2025-07-12 LAB
ALBUMIN SERPL-MCNC: 3.8 G/DL (ref 3.5–5.2)
ALBUMIN/GLOB SERPL: 0.9 G/DL
ALP SERPL-CCNC: 108 U/L (ref 39–117)
ALT SERPL W P-5'-P-CCNC: 11 U/L (ref 1–41)
ANION GAP SERPL CALCULATED.3IONS-SCNC: 12.7 MMOL/L (ref 5–15)
AST SERPL-CCNC: 16 U/L (ref 1–40)
BASOPHILS # BLD AUTO: 0.05 10*3/MM3 (ref 0–0.2)
BASOPHILS NFR BLD AUTO: 0.7 % (ref 0–1.5)
BILIRUB SERPL-MCNC: 0.5 MG/DL (ref 0–1.2)
BUN SERPL-MCNC: 12.8 MG/DL (ref 8–23)
BUN/CREAT SERPL: 12.2 (ref 7–25)
CALCIUM SPEC-SCNC: 9.3 MG/DL (ref 8.6–10.5)
CHLORIDE SERPL-SCNC: 99 MMOL/L (ref 98–107)
CO2 SERPL-SCNC: 24.3 MMOL/L (ref 22–29)
CREAT SERPL-MCNC: 1.05 MG/DL (ref 0.76–1.27)
DEPRECATED RDW RBC AUTO: 42.6 FL (ref 37–54)
EGFRCR SERPLBLD CKD-EPI 2021: 79.3 ML/MIN/1.73
EOSINOPHIL # BLD AUTO: 0.34 10*3/MM3 (ref 0–0.4)
EOSINOPHIL NFR BLD AUTO: 4.9 % (ref 0.3–6.2)
ERYTHROCYTE [DISTWIDTH] IN BLOOD BY AUTOMATED COUNT: 12.8 % (ref 12.3–15.4)
GLOBULIN UR ELPH-MCNC: 4.1 GM/DL
GLUCOSE BLDC GLUCOMTR-MCNC: 104 MG/DL (ref 70–99)
GLUCOSE BLDC GLUCOMTR-MCNC: 113 MG/DL (ref 70–99)
GLUCOSE BLDC GLUCOMTR-MCNC: 144 MG/DL (ref 70–99)
GLUCOSE BLDC GLUCOMTR-MCNC: 91 MG/DL (ref 70–99)
GLUCOSE SERPL-MCNC: 138 MG/DL (ref 65–99)
HCT VFR BLD AUTO: 42.6 % (ref 37.5–51)
HGB BLD-MCNC: 14.1 G/DL (ref 13–17.7)
IMM GRANULOCYTES # BLD AUTO: 0.03 10*3/MM3 (ref 0–0.05)
IMM GRANULOCYTES NFR BLD AUTO: 0.4 % (ref 0–0.5)
LYMPHOCYTES # BLD AUTO: 1.49 10*3/MM3 (ref 0.7–3.1)
LYMPHOCYTES NFR BLD AUTO: 21.5 % (ref 19.6–45.3)
MAGNESIUM SERPL-MCNC: 2.2 MG/DL (ref 1.6–2.4)
MCH RBC QN AUTO: 30 PG (ref 26.6–33)
MCHC RBC AUTO-ENTMCNC: 33.1 G/DL (ref 31.5–35.7)
MCV RBC AUTO: 90.6 FL (ref 79–97)
MONOCYTES # BLD AUTO: 0.55 10*3/MM3 (ref 0.1–0.9)
MONOCYTES NFR BLD AUTO: 7.9 % (ref 5–12)
NEUTROPHILS NFR BLD AUTO: 4.47 10*3/MM3 (ref 1.7–7)
NEUTROPHILS NFR BLD AUTO: 64.6 % (ref 42.7–76)
NRBC BLD AUTO-RTO: 0 /100 WBC (ref 0–0.2)
PHOSPHATE SERPL-MCNC: 3.6 MG/DL (ref 2.5–4.5)
PLATELET # BLD AUTO: 275 10*3/MM3 (ref 140–450)
PMV BLD AUTO: 8.5 FL (ref 6–12)
POTASSIUM SERPL-SCNC: 4.3 MMOL/L (ref 3.5–5.2)
PROT SERPL-MCNC: 7.9 G/DL (ref 6–8.5)
RBC # BLD AUTO: 4.7 10*6/MM3 (ref 4.14–5.8)
SODIUM SERPL-SCNC: 136 MMOL/L (ref 136–145)
WBC NRBC COR # BLD AUTO: 6.93 10*3/MM3 (ref 3.4–10.8)

## 2025-07-12 PROCEDURE — 25010000002 KETOROLAC TROMETHAMINE PER 15 MG: Performed by: PODIATRIST

## 2025-07-12 PROCEDURE — 99231 SBSQ HOSP IP/OBS SF/LOW 25: CPT | Performed by: PODIATRIST

## 2025-07-12 PROCEDURE — 82948 REAGENT STRIP/BLOOD GLUCOSE: CPT

## 2025-07-12 PROCEDURE — 25810000003 SODIUM CHLORIDE 0.9 % SOLUTION 500 ML FLEX CONT: Performed by: PODIATRIST

## 2025-07-12 PROCEDURE — 80053 COMPREHEN METABOLIC PANEL: CPT | Performed by: INTERNAL MEDICINE

## 2025-07-12 PROCEDURE — 94799 UNLISTED PULMONARY SVC/PX: CPT

## 2025-07-12 PROCEDURE — 83735 ASSAY OF MAGNESIUM: CPT | Performed by: INTERNAL MEDICINE

## 2025-07-12 PROCEDURE — 99232 SBSQ HOSP IP/OBS MODERATE 35: CPT | Performed by: INTERNAL MEDICINE

## 2025-07-12 PROCEDURE — 85025 COMPLETE CBC W/AUTO DIFF WBC: CPT | Performed by: INTERNAL MEDICINE

## 2025-07-12 PROCEDURE — 84100 ASSAY OF PHOSPHORUS: CPT | Performed by: INTERNAL MEDICINE

## 2025-07-12 PROCEDURE — 25010000002 HEPARIN (PORCINE) PER 1000 UNITS: Performed by: PODIATRIST

## 2025-07-12 PROCEDURE — 25010000002 AMPICILLIN-SULBACTAM PER 1.5 G: Performed by: STUDENT IN AN ORGANIZED HEALTH CARE EDUCATION/TRAINING PROGRAM

## 2025-07-12 RX ADMIN — Medication 10 ML: at 14:19

## 2025-07-12 RX ADMIN — KETOROLAC TROMETHAMINE 15 MG: 30 INJECTION, SOLUTION INTRAMUSCULAR; INTRAVENOUS at 09:09

## 2025-07-12 RX ADMIN — HEPARIN SODIUM 5000 UNITS: 5000 INJECTION INTRAVENOUS; SUBCUTANEOUS at 14:19

## 2025-07-12 RX ADMIN — Medication 10 ML: at 09:10

## 2025-07-12 RX ADMIN — KETOROLAC TROMETHAMINE 15 MG: 30 INJECTION, SOLUTION INTRAMUSCULAR; INTRAVENOUS at 17:02

## 2025-07-12 RX ADMIN — HEPARIN SODIUM 5000 UNITS: 5000 INJECTION INTRAVENOUS; SUBCUTANEOUS at 05:46

## 2025-07-12 RX ADMIN — AMPICILLIN SODIUM AND SULBACTAM SODIUM 3 G: 2; 1 INJECTION, POWDER, FOR SOLUTION INTRAMUSCULAR; INTRAVENOUS at 07:59

## 2025-07-12 RX ADMIN — SODIUM CHLORIDE 40 ML: 9 INJECTION, SOLUTION INTRAVENOUS at 03:12

## 2025-07-12 RX ADMIN — AMPICILLIN SODIUM AND SULBACTAM SODIUM 3 G: 2; 1 INJECTION, POWDER, FOR SOLUTION INTRAMUSCULAR; INTRAVENOUS at 20:10

## 2025-07-12 RX ADMIN — Medication 10 ML: at 17:02

## 2025-07-12 RX ADMIN — GABAPENTIN 300 MG: 300 CAPSULE ORAL at 20:10

## 2025-07-12 RX ADMIN — Medication 10 ML: at 20:10

## 2025-07-12 RX ADMIN — GABAPENTIN 300 MG: 300 CAPSULE ORAL at 09:07

## 2025-07-12 RX ADMIN — AMPICILLIN SODIUM AND SULBACTAM SODIUM 3 G: 2; 1 INJECTION, POWDER, FOR SOLUTION INTRAMUSCULAR; INTRAVENOUS at 03:12

## 2025-07-12 RX ADMIN — GABAPENTIN 300 MG: 300 CAPSULE ORAL at 15:09

## 2025-07-12 RX ADMIN — AMPICILLIN SODIUM AND SULBACTAM SODIUM 3 G: 2; 1 INJECTION, POWDER, FOR SOLUTION INTRAMUSCULAR; INTRAVENOUS at 14:19

## 2025-07-12 RX ADMIN — HEPARIN SODIUM 5000 UNITS: 5000 INJECTION INTRAVENOUS; SUBCUTANEOUS at 21:13

## 2025-07-12 RX ADMIN — Medication 10 ML: at 08:00

## 2025-07-12 NOTE — PROGRESS NOTES
[facility]   Hospitalist Progress Note  Date: 2025  Patient Name: Idris Park  : 1960  MRN: [id number]  Date of admission: 2025    Subjective   Subjective     Chief Complaint:   Left toe osteomyelitis    Summary:   Idris Park is a 65 y.o. male  with a past medical history of peripheral neuropathy, hyperlipidemia, diabetes mellitus sent to the hospital as a direct admit from wound care clinic for evaluation and management of left great toe wound infection.  Recent outpatient MRI showed osteomyelitis of the first distal phalanx, first proximal phalanx and distal portion of the first metatarsal, extensive cellulitis of the great toe.  15 x 11 x 10 mm abscess with fistulous tract extending to the dorsal surface of the first toe with evidence of resting of septic joint.  Marked soft tissue edema of the dorsum of the foot and within the intermuscular soft tissue suggests cellulitis versus venous edema.  Wound cultures from 2025 showed positive for Streptococcus and Staphylococcus aureus, susceptible to oxacillin.  Patient follows Dr. Whitehead and also seen by podiatrist at [facility] on 7/3/2025 and his toenail has been removed during that visit.     Interval Followup:   No acute events overnight, patient resting comfortably in bed, patient denies pain following surgery, patient remains on antibiotics    Objective   Objective     Vitals:   Temp:  [97.4 °F (36.3 °C)-98.1 °F (36.7 °C)] 98.1 °F (36.7 °C)  Heart Rate:  [60-70] 66  Resp:  [18] 18  BP: ()/(50-82) 111/65    Physical Exam   GEN: No acute distress, sitting up in chair  HEENT: Moist mucous membranes  LUNGS: Equal chest rise bilaterally  CARDIAC: Regular rate and rhythm  NEURO: Moving all 4 extremities spontaneously  SKIN: Surgical dressing in place following toe amputation    Result Review    Result Review:  I have personally reviewed the results as below  [x]  Laboratory CBC, CMP personally reviewed  CBC           7/10/2025    04:21 7/11/2025    04:17 7/12/2025    04:23   CBC   WBC 6.67  7.24  6.93    RBC 4.65  4.65  4.70    Hemoglobin 13.7  13.5  14.1    Hematocrit 42.8  42.1  42.6    MCV 92.0  90.5  90.6    MCH 29.5  29.0  30.0    MCHC 32.0  32.1  33.1    RDW 12.9  13.0  12.8    Platelets 264  269  275      CMP          7/10/2025    04:21 7/11/2025    04:17 7/12/2025    04:23   CMP   Glucose 140  145  138    BUN 15.4  18.5  12.8    Creatinine 1.17  1.16  1.05    EGFR 69.6  70.3  79.3    Sodium 132  135  136    Potassium 4.7  4.7  4.3    Chloride 98  100  99    Calcium 9.0  8.9  9.3    Total Protein 7.2  7.4  7.9    Albumin 3.5  3.7  3.8    Globulin 3.7  3.7  4.1    Total Bilirubin 0.4  0.5  0.5    Alkaline Phosphatase 110  105  108    AST (SGOT) 12  14  16    ALT (SGPT) 10  11  11    Albumin/Globulin Ratio 0.9  1.0  0.9    BUN/Creatinine Ratio 13.2  15.9  12.2    Anion Gap 10.5  12.2  12.7      []  Microbiology  []  Radiology  []  EKG/Telemetry   []  Cardiology/Vascular   []  Pathology  []  Old records  []  Other:    Scheduled medications:  ampicillin-sulbactam, 3 g, Intravenous, Q6H  gabapentin, 300 mg, Oral, TID  heparin (porcine), 5,000 Units, Subcutaneous, Q8H  insulin lispro, 2-7 Units, Subcutaneous, 4x Daily AC & at Bedtime  sodium chloride, 10 mL, Intravenous, Q12H      As needed medications:    acetaminophen    senna-docusate sodium **AND** polyethylene glycol **AND** bisacodyl **AND** bisacodyl    dextrose    dextrose    glucagon (human recombinant)    ketorolac    sodium chloride    sodium chloride  Infusions:          Assessment & Plan   Assessment / Plan     Assessment:  Left great toe osteomyelitis  Diabetic foot wound with cellulitis  Poorly controlled diabetes mellitus  Possible septic arthritis  Peripheral neuropathy  Type 2 diabetes mellitus    Plan:  Patient admitted to the hospital for further workup and management of above  Continue amp sulbactam based off prior culture and sensitivity data, can  transition to Augmentin at discharge  Blood cultures ordered no growth to date  Podiatry consulted, status post amputation on 7/11/2025  Continue basal bolus insulin  Healthy heart carb consistent diet  Fall precautions  Wound care consult  Continue postoperative pain control as needed  PT/OT  CBC, CMP reviewed 7/12/2025   Repeat CBC, CMP, mag and Phos in a.m. 7/12/2025      Reviewed patient's labs and imaging, and discussed with patient and nurse at bedside.    VTE Prophylaxis:  Pharmacologic VTE prophylaxis orders are present.    Disposition: Home when okay with podiatry    CODE STATUS:   Code Status (Patient has no pulse and is not breathing): CPR (Attempt to Resuscitate)  Medical Interventions (Patient has pulse or is breathing): Full Support  Level Of Support Discussed With: Patient      Electronically signed by Jb Arevalo MD, 7/12/2025, 12:37 EDT.

## 2025-07-12 NOTE — PROGRESS NOTES
[facility] - PODIATRY    Today's Date: 07/12/25    Patient Name: Idris Park  MRN: [id number]  CSN: [id number]  PCP: Dae Yan APRN  Referring Provider: Elke Ford MD  Attending Provider: Jb Arevalo MD  Length of Stay: 4    SUBJECTIVE   Chief Complaint: Osteomyelitis of left great toe    HPI: Idris Park, a 65 y.o.male,     Patient in good spirits. Doing well.    Past Medical History:   Diagnosis Date    Acromioclavicular separation 2004    Arthritis     COVID-19     Diabetes     Foot ulcer     Hammer toe     Hyperlipidemia     Knee swelling 2000    Limb pain     Limb swelling     Neuropathy in diabetes      Past Surgical History:   Procedure Laterality Date    AMPUTATION DIGIT Left 7/11/2025    Procedure: LEFT GREAT TOE AMPUTATION;  Surgeon: Ervin Whitehead DPM;  Location: [facility] OR [facility];  Service: Podiatry;  Laterality: Left;    DENTAL PROCEDURE      EXTERNAL EAR SURGERY Bilateral     HERNIA REPAIR  1967    also 2011    INCISION AND DRAINAGE FOOT Left 7/11/2025    Procedure: INCISION AND DRAINAGE FOOT BURSAL SPACE;  Surgeon: Ervin Whitehead DPM;  Location: [facility] OR [facility];  Service: Podiatry;  Laterality: Left;    JOINT REPLACEMENT Left     Artificial Joints/Limbs    KNEE ARTHROPLASTY Right     OTHER SURGICAL HISTORY      JOINT SURGERY     Family History   Problem Relation Age of Onset    Cancer Mother     Breast cancer Mother 50     Social History     Socioeconomic History    Marital status:    Tobacco Use    Smoking status: Never     Passive exposure: Past    Smokeless tobacco: Never   Vaping Use    Vaping status: Never Used   Substance and Sexual Activity    Alcohol use: Not Currently     Comment: socially-BEER MONTHLY    Drug use: Never    Sexual activity: Yes     Partners: Female     Birth control/protection: None     Allergies   Allergen Reactions    Cephalexin Rash     Has tolerated Ampicillin/Sulbactam and Amoxicillin/clavulanate Cabrera  Albertina Akbar     Current Facility-Administered Medications   Medication Dose Route Frequency Provider Last Rate Last Admin    acetaminophen (TYLENOL) tablet 650 mg  650 mg Oral Q6H PRN Ervin Whitehead DPM        ampicillin-sulbactam (UNASYN) 3 g in sodium chloride 0.9 % 100 mL IVPB-VTB  3 g Intravenous Q6H Suman Chavez MD 0 mL/hr at 07/12/25 0414 3 g at 07/12/25 1419    sennosides-docusate (PERICOLACE) 8.6-50 MG per tablet 2 tablet  2 tablet Oral BID PRN Ervin Whitehead DPM        And    polyethylene glycol (MIRALAX) packet 17 g  17 g Oral Daily PRN Ervin Whitehead DPM        And    bisacodyl (DULCOLAX) EC tablet 5 mg  5 mg Oral Daily PRN Ervin Whitehead DPM        And    bisacodyl (DULCOLAX) suppository 10 mg  10 mg Rectal Daily PRN Ervin Whitehead DPM        dextrose (D50W) (25 g/50 mL) IV injection 25 g  25 g Intravenous Q15 Min PRN Ervin Whitehead DPM        dextrose (GLUTOSE) oral gel 15 g  15 g Oral Q15 Min PRN Ervin Whitehead DPM        gabapentin (NEURONTIN) capsule 300 mg  300 mg Oral TID Ervin Whitehead DPM   300 mg at 07/12/25 1509    glucagon (GLUCAGEN) injection 1 mg  1 mg Intramuscular Q15 Min PRN Ervin Whitehead DPM        heparin (porcine) 5000 UNIT/ML injection 5,000 Units  5,000 Units Subcutaneous Q8H Ervin Whitehead DPM   5,000 Units at 07/12/25 1419    Insulin Lispro (humaLOG) injection 2-7 Units  2-7 Units Subcutaneous 4x Daily AC & at Bedtime Ervin Whitehead DPM        ketorolac (TORADOL) injection 15 mg  15 mg Intravenous Q6H PRN Ervin Whitehead DPM   15 mg at 07/12/25 1702    sodium chloride 0.9 % flush 10 mL  10 mL Intravenous Q12H Ervin Whitehead DPM   10 mL at 07/12/25 0800    sodium chloride 0.9 % flush 10 mL  10 mL Intravenous PRN Ervin Whitehead DPM   10 mL at 07/12/25 1702    sodium chloride 0.9 % infusion 40 mL  40 mL Intravenous PRN Ervin Whitehead DPM    40 mL at 07/12/25 0312       OBJECTIVE     Vitals:    07/12/25 1600   BP: 110/64   Pulse: 58   Resp: 18   Temp: 98.1 °F (36.7 °C)   SpO2: 93%       PHYSICAL EXAM    GEN:   A&Ox3, NAD. Pt presents in hospital bed. Accompanied by the patient's nurse.    Sutures intact. Incision well coapted. No erythema, no malodor, no drainage to amputation site.       RADIOLOGY/NUCLEAR:  XR Foot 2 View Left  Result Date: 7/11/2025  Narrative: XR FOOT 2 VW LEFT Date of Exam: 7/11/2025 10:12 AM EDT Indication: Left great toe amputation Post Surgical Procedure Comparison: None available. Findings: Postoperative changes are noted status post amputation of the first digit. Residual changes are seen involving the distal head of the first metatarsal bone. Residual soft tissue swelling is noted.     Impression: Impression: Postoperative changes status post amputation of the first digit. Electronically Signed: Deniz Sigala MD  7/11/2025 10:22 AM EDT  Workstation ID: [id number]    MRI Foot Left With & Without Contrast  Result Date: 6/30/2025  Narrative: MRI FOOT LEFT W WO CONTRAST Date of Exam: 6/30/2025 7:08 PM EDT Indication: Probable osteomyelitis in Left Hallux and First Metatarsal shaft.  Comparison: Radiograph dated June 27 Technique:  Routine multiplanar/multisequence sequence images of the left foot were obtained before and after the uneventful administration of Multihance.  Findings: There is extensive edema in the superficial soft tissues of the dorsum of the foot as well as in the plantar surface of the foot There is extensive intramuscular edema in the interosseous musculature of the foot. There are moderate degenerative changes in the midfoot with subchondral chondral edema and prominent spurring noted. The first toe demonstrates abnormal signal with extensive edema in the distal phalanx, proximal phalanx and distal portion of the first metatarsal In addition there are extensive subchondral cysts in the metatarsal head. There  appears to be a fistulous tract extending from the distal portion of the first proximal phalanx and first interphalangeal joint with the tract communicating with the skin surface of the dorsum of the foot at this site There is an irregular enhancing fluid collection dorsal surface of the first toe distally in extending somewhat medial to the first distal phalanx This collection also appears to extend to the skin surface with perhaps a draining fistulous tract. This collection measures approximately 10 x 11 x 15 mm in size.     Impression: Impression: Osteomyelitis of the first distal phalanx, first proximal phalanx, and distal portion of the first metatarsal. Extensive cellulitis of the great toe 15 x 11 x 10 mm abscess with fistulous tracts extending to the dorsal surface of the first toe with evidence suggesting of septic joint. Marked soft tissue edema of the dorsum of the foot and within the intermuscular soft tissues suggests cellulitis versus venous edema. Electronically Signed: Rogerio Arnold MD  6/30/2025 9:07 PM EDT  Workstation ID: [id number]    XR Foot 3+ View Left  Result Date: 6/27/2025  Narrative: XR FOOT 3+ VW LEFT Date of Exam: 6/27/2025 11:56 AM EDT Indication: wound Comparison: Left foot 8/23/2024 Findings: Evaluation of the left foot demonstrates extensive dorsal and first digit soft tissue swelling. No definite cortical disruption noted involving the first proximal phalanx or distal phalanx. However, there is lucency within the head of the first metatarsal bone which may be related to prior fracture and bone resorption however additional infection not entirely excluded. There is joint space narrowing at the first DIP joint. Additional findings include degenerative changes in the midfoot with dorsal osteophyte. Plantar calcaneal spur is noted.     Impression: Impression: Lucency within the head of the first metatarsal bone some of which could be related to prior trauma however there is extensive  soft tissue swelling involving the first digit and dorsal margin of the foot and underlying osteomyelitis not excluded. Further  evaluation with MRI recommended for better evaluation. Electronically Signed: Keyona Carlson MD  6/27/2025 12:19 PM EDT  Workstation ID: [id number]      LABORATORY/CULTURE RESULTS:  Results from last 7 days   Lab Units 07/12/25  0423 07/11/25  0417 07/10/25  0421   WBC 10*3/mm3 6.93 7.24 6.67   HEMOGLOBIN g/dL 14.1 13.5 13.7   HEMATOCRIT % 42.6 42.1 42.8   PLATELETS 10*3/mm3 275 269 264     Results from last 7 days   Lab Units 07/12/25  0423 07/11/25  0417 07/10/25  0421   SODIUM mmol/L 136 135* 132*   POTASSIUM mmol/L 4.3 4.7 4.7   CHLORIDE mmol/L 99 100 98   CO2 mmol/L 24.3 22.8 23.5   BUN mg/dL 12.8 18.5 15.4   CREATININE mg/dL 1.05 1.16 1.17   CALCIUM mg/dL 9.3 8.9 9.0   BILIRUBIN mg/dL 0.5 0.5 0.4   ALK PHOS U/L 108 105 110   ALT (SGPT) U/L 11 11 10   AST (SGOT) U/L 16 14 12   GLUCOSE mg/dL 138* 145* 140*         Microbiology Results (last 10 days)       Procedure Component Value - Date/Time    Tissue Culture, Quantitative - Tissue, Toe, Left [[id number]] Collected: 07/11/25 0907    Lab Status: Preliminary result Specimen: Tissue from Toe, Left Updated: 07/12/25 1354     Tissue Culture Culture in progress     Gram Stain Rare (1+) WBCs seen      Rare (1+) Gram positive cocci      Rare (1+) Epithelial cells seen    Tissue / Bone Culture - Tissue, Toe, Left [[id number]] Collected: 07/11/25 0907    Lab Status: Preliminary result Specimen: Tissue from Toe, Left Updated: 07/12/25 0838     Tissue Culture Culture in progress     Gram Stain Rare (1+) WBCs seen      Rare (1+) Gram positive cocci      Rare (1+) Epithelial cells seen    Wound Culture - Wound, Toe, Left [[id number]]  (Abnormal)  (Susceptibility) Collected: 07/08/25 1154    Lab Status: Final result Specimen: Wound from Toe, Left Updated: 07/11/25 5790     Wound Culture Rare growth Staphylococcus aureus      Rare growth Normal Skin  Catie     Gram Stain Moderate (3+) WBCs seen      Few (2+) Gram positive cocci in pairs    Susceptibility        Staphylococcus aureus      MELIA      Clindamycin Resistant      Erythromycin Resistant      Oxacillin Susceptible      Rifampin Susceptible      Tetracycline Resistant      Trimethoprim + Sulfamethoxazole Susceptible      Vancomycin Susceptible                       Susceptibility Comments       Staphylococcus aureus    This isolate is presumed to be clindamycin resistant based on detection of inducible clindamycin resistance.  Clindamycin may still be effective in some patients.               Blood Culture - Blood, Hand, Left [[id number]]  (Normal) Collected: 07/08/25 1154    Lab Status: Preliminary result Specimen: Blood from Hand, Left Updated: 07/12/25 1216     Blood Culture No growth at 4 days    Blood Culture - Blood, Hand, Right [[id number]]  (Normal) Collected: 07/08/25 1154    Lab Status: Preliminary result Specimen: Blood from Hand, Right Updated: 07/12/25 1216     Blood Culture No growth at 4 days    Wound Culture - Wound, Toe, Left [[id number]]  (Abnormal) Collected: 07/08/25 1148    Lab Status: Final result Specimen: Wound from Toe, Left Updated: 07/11/25 0727     Wound Culture Rare growth Staphylococcus aureus      Rare growth Normal Skin Catie     Gram Stain Few (2+) WBCs seen      No organisms seen    Narrative:      Refer to previous wound culture collected on 07/08/2025 1154 for MICs.             ASSESSMENT/PLAN     Active Hospital Problems:  Active Hospital Problems    Diagnosis     **Osteomyelitis        Comprehensive lower extremity examination and evaluation was performed.    - Dressing changes daily, Surgical shoe to operative extremity  -Follow cultures  - Patient can follow up in 1-2 weeks after discharge   - No further surgical intervention planned at this time.           This document has been electronically signed by Troy Moon DPM on July 12, 2025 19:52 EDT

## 2025-07-12 NOTE — PLAN OF CARE
Goal Outcome Evaluation:  Plan of Care Reviewed With: patient        Progress: improving  Outcome Evaluation: c/o pain in left foot alleviated with prn toradol. denies nausea but also noted with poor appetite today, discussed nutritional needs for wound healing. wound care provided for right great toe as ordered. podiatry rounded and changed drsg to left foot, patient states possible discharge tomorrow per his discussion with podiatry, he is hopeful. wound care needs for home discussed with patient, he is confident he and his wife can manage wound care. no new issues/needs noted at this time.

## 2025-07-12 NOTE — PLAN OF CARE
Goal Outcome Evaluation:  Plan of Care Reviewed With: patient        Progress: improving  Outcome Evaluation: Pt remains A&Ox4. VSS, on room air. Surgical dsg on left foot CDI. Neurovascular checks q4hr per orders. C/o some discomfort, but refuses pain medication. Call light in reach. No further issues/needs at this time.

## 2025-07-13 LAB
ALBUMIN SERPL-MCNC: 3.6 G/DL (ref 3.5–5.2)
ALBUMIN/GLOB SERPL: 0.9 G/DL
ALP SERPL-CCNC: 100 U/L (ref 39–117)
ALT SERPL W P-5'-P-CCNC: 14 U/L (ref 1–41)
ANION GAP SERPL CALCULATED.3IONS-SCNC: 12.9 MMOL/L (ref 5–15)
AST SERPL-CCNC: 18 U/L (ref 1–40)
BACTERIA SPEC AEROBE CULT: NORMAL
BACTERIA SPEC AEROBE CULT: NORMAL
BASOPHILS # BLD AUTO: 0.08 10*3/MM3 (ref 0–0.2)
BASOPHILS NFR BLD AUTO: 1.4 % (ref 0–1.5)
BILIRUB SERPL-MCNC: 0.5 MG/DL (ref 0–1.2)
BUN SERPL-MCNC: 13.8 MG/DL (ref 8–23)
BUN/CREAT SERPL: 13 (ref 7–25)
CALCIUM SPEC-SCNC: 9.2 MG/DL (ref 8.6–10.5)
CHLORIDE SERPL-SCNC: 100 MMOL/L (ref 98–107)
CO2 SERPL-SCNC: 22.1 MMOL/L (ref 22–29)
CREAT SERPL-MCNC: 1.06 MG/DL (ref 0.76–1.27)
DEPRECATED RDW RBC AUTO: 43.6 FL (ref 37–54)
EGFRCR SERPLBLD CKD-EPI 2021: 78.4 ML/MIN/1.73
EOSINOPHIL # BLD AUTO: 0.53 10*3/MM3 (ref 0–0.4)
EOSINOPHIL NFR BLD AUTO: 9 % (ref 0.3–6.2)
ERYTHROCYTE [DISTWIDTH] IN BLOOD BY AUTOMATED COUNT: 13 % (ref 12.3–15.4)
GLOBULIN UR ELPH-MCNC: 4 GM/DL
GLUCOSE BLDC GLUCOMTR-MCNC: 112 MG/DL (ref 70–99)
GLUCOSE BLDC GLUCOMTR-MCNC: 112 MG/DL (ref 70–99)
GLUCOSE BLDC GLUCOMTR-MCNC: 118 MG/DL (ref 70–99)
GLUCOSE BLDC GLUCOMTR-MCNC: 171 MG/DL (ref 70–99)
GLUCOSE SERPL-MCNC: 118 MG/DL (ref 65–99)
HCT VFR BLD AUTO: 43.4 % (ref 37.5–51)
HGB BLD-MCNC: 13.8 G/DL (ref 13–17.7)
IMM GRANULOCYTES # BLD AUTO: 0.01 10*3/MM3 (ref 0–0.05)
IMM GRANULOCYTES NFR BLD AUTO: 0.2 % (ref 0–0.5)
LYMPHOCYTES # BLD AUTO: 1.28 10*3/MM3 (ref 0.7–3.1)
LYMPHOCYTES NFR BLD AUTO: 21.7 % (ref 19.6–45.3)
MCH RBC QN AUTO: 29.1 PG (ref 26.6–33)
MCHC RBC AUTO-ENTMCNC: 31.8 G/DL (ref 31.5–35.7)
MCV RBC AUTO: 91.4 FL (ref 79–97)
MONOCYTES # BLD AUTO: 0.5 10*3/MM3 (ref 0.1–0.9)
MONOCYTES NFR BLD AUTO: 8.5 % (ref 5–12)
NEUTROPHILS NFR BLD AUTO: 3.49 10*3/MM3 (ref 1.7–7)
NEUTROPHILS NFR BLD AUTO: 59.2 % (ref 42.7–76)
NRBC BLD AUTO-RTO: 0 /100 WBC (ref 0–0.2)
PLATELET # BLD AUTO: 263 10*3/MM3 (ref 140–450)
PMV BLD AUTO: 8.7 FL (ref 6–12)
POTASSIUM SERPL-SCNC: 4.4 MMOL/L (ref 3.5–5.2)
PROT SERPL-MCNC: 7.6 G/DL (ref 6–8.5)
RBC # BLD AUTO: 4.75 10*6/MM3 (ref 4.14–5.8)
SODIUM SERPL-SCNC: 135 MMOL/L (ref 136–145)
WBC NRBC COR # BLD AUTO: 5.89 10*3/MM3 (ref 3.4–10.8)

## 2025-07-13 PROCEDURE — 82948 REAGENT STRIP/BLOOD GLUCOSE: CPT | Performed by: PODIATRIST

## 2025-07-13 PROCEDURE — 99232 SBSQ HOSP IP/OBS MODERATE 35: CPT | Performed by: INTERNAL MEDICINE

## 2025-07-13 PROCEDURE — 82948 REAGENT STRIP/BLOOD GLUCOSE: CPT

## 2025-07-13 PROCEDURE — 25010000002 AMPICILLIN-SULBACTAM PER 1.5 G: Performed by: INTERNAL MEDICINE

## 2025-07-13 PROCEDURE — 94799 UNLISTED PULMONARY SVC/PX: CPT

## 2025-07-13 PROCEDURE — 25010000002 AMPICILLIN-SULBACTAM PER 1.5 G: Performed by: STUDENT IN AN ORGANIZED HEALTH CARE EDUCATION/TRAINING PROGRAM

## 2025-07-13 PROCEDURE — 85025 COMPLETE CBC W/AUTO DIFF WBC: CPT | Performed by: INTERNAL MEDICINE

## 2025-07-13 PROCEDURE — 25010000002 HEPARIN (PORCINE) PER 1000 UNITS: Performed by: PODIATRIST

## 2025-07-13 PROCEDURE — 80053 COMPREHEN METABOLIC PANEL: CPT | Performed by: INTERNAL MEDICINE

## 2025-07-13 RX ADMIN — AMPICILLIN SODIUM AND SULBACTAM SODIUM 3 G: 2; 1 INJECTION, POWDER, FOR SOLUTION INTRAMUSCULAR; INTRAVENOUS at 03:45

## 2025-07-13 RX ADMIN — GABAPENTIN 300 MG: 300 CAPSULE ORAL at 15:50

## 2025-07-13 RX ADMIN — Medication 10 ML: at 21:07

## 2025-07-13 RX ADMIN — AMPICILLIN SODIUM AND SULBACTAM SODIUM 3 G: 2; 1 INJECTION, POWDER, FOR SOLUTION INTRAMUSCULAR; INTRAVENOUS at 07:50

## 2025-07-13 RX ADMIN — HEPARIN SODIUM 5000 UNITS: 5000 INJECTION INTRAVENOUS; SUBCUTANEOUS at 13:36

## 2025-07-13 RX ADMIN — DOCUSATE SODIUM 50 MG AND SENNOSIDES 8.6 MG 2 TABLET: 8.6; 5 TABLET, FILM COATED ORAL at 08:58

## 2025-07-13 RX ADMIN — GABAPENTIN 300 MG: 300 CAPSULE ORAL at 21:07

## 2025-07-13 RX ADMIN — SODIUM CHLORIDE 40 ML: 9 INJECTION, SOLUTION INTRAVENOUS at 03:45

## 2025-07-13 RX ADMIN — AMPICILLIN SODIUM AND SULBACTAM SODIUM 3 G: 2; 1 INJECTION, POWDER, FOR SOLUTION INTRAMUSCULAR; INTRAVENOUS at 13:36

## 2025-07-13 RX ADMIN — AMPICILLIN SODIUM AND SULBACTAM SODIUM 3 G: 2; 1 INJECTION, POWDER, FOR SOLUTION INTRAMUSCULAR; INTRAVENOUS at 21:07

## 2025-07-13 RX ADMIN — Medication 10 ML: at 08:59

## 2025-07-13 RX ADMIN — HEPARIN SODIUM 5000 UNITS: 5000 INJECTION INTRAVENOUS; SUBCUTANEOUS at 06:14

## 2025-07-13 RX ADMIN — GABAPENTIN 300 MG: 300 CAPSULE ORAL at 08:58

## 2025-07-13 NOTE — PLAN OF CARE
Goal Outcome Evaluation:  Plan of Care Reviewed With: patient        Progress: no change  Outcome Evaluation: Remains A&Ox4. VSS, on room air. Surgical dsg on left foot remains CDI. No c/o pain this shift. Has been sitting on side of bed intermittently. Using urinal. Refuses bed alert, education risks/benefits explained. Call light in reach, able to make needs known. No further issues/needs at this time.

## 2025-07-13 NOTE — PROGRESS NOTES
[facility]   Hospitalist Progress Note  Date: 2025  Patient Name: Idris Park  : 1960  MRN: [id number]  Date of admission: 2025    Subjective   Subjective     Chief Complaint:   Left toe osteomyelitis    Summary:   Idris Park is a 65 y.o. male  with a past medical history of peripheral neuropathy, hyperlipidemia, diabetes mellitus sent to the hospital as a direct admit from wound care clinic for evaluation and management of left great toe wound infection.  Recent outpatient MRI showed osteomyelitis of the first distal phalanx, first proximal phalanx and distal portion of the first metatarsal, extensive cellulitis of the great toe.  15 x 11 x 10 mm abscess with fistulous tract extending to the dorsal surface of the first toe with evidence of resting of septic joint.  Marked soft tissue edema of the dorsum of the foot and within the intermuscular soft tissue suggests cellulitis versus venous edema.  Wound cultures from 2025 showed positive for Streptococcus and Staphylococcus aureus, susceptible to oxacillin.  Patient follows Dr. Whitehead and also seen by podiatrist at [facility] on 7/3/2025 and his toenail has been removed during that visit.     Interval Followup:   No acute events overnight, pain well-controlled, podiatry following, awaiting culture results from surgery    Objective   Objective     Vitals:   Temp:  [97.8 °F (36.6 °C)-98.1 °F (36.7 °C)] 97.9 °F (36.6 °C)  Heart Rate:  [57-67] 60  Resp:  [16-18] 16  BP: ()/(42-71) 121/71    Physical Exam   GEN: No acute distress, sitting up in chair  HEENT: Moist mucous membranes  LUNGS: Equal chest rise bilaterally  CARDIAC: Regular rate and rhythm  NEURO: Moving all 4 extremities spontaneously  SKIN: Surgical dressing in place following toe amputation    Result Review    Result Review:  I have personally reviewed the results as below  [x]  Laboratory CBC, CMP personally reviewed  CBC          2025    04:17  7/12/2025    04:23 7/13/2025    07:23   CBC   WBC 7.24  6.93  5.89    RBC 4.65  4.70  4.75    Hemoglobin 13.5  14.1  13.8    Hematocrit 42.1  42.6  43.4    MCV 90.5  90.6  91.4    MCH 29.0  30.0  29.1    MCHC 32.1  33.1  31.8    RDW 13.0  12.8  13.0    Platelets 269  275  263      CMP          7/11/2025    04:17 7/12/2025    04:23 7/13/2025    07:23   CMP   Glucose 145  138  118    BUN 18.5  12.8  13.8    Creatinine 1.16  1.05  1.06    EGFR 70.3  79.3  78.4    Sodium 135  136  135    Potassium 4.7  4.3  4.4    Chloride 100  99  100    Calcium 8.9  9.3  9.2    Total Protein 7.4  7.9  7.6    Albumin 3.7  3.8  3.6    Globulin 3.7  4.1  4.0    Total Bilirubin 0.5  0.5  0.5    Alkaline Phosphatase 105  108  100    AST (SGOT) 14  16  18    ALT (SGPT) 11  11  14    Albumin/Globulin Ratio 1.0  0.9  0.9    BUN/Creatinine Ratio 15.9  12.2  13.0    Anion Gap 12.2  12.7  12.9      []  Microbiology  []  Radiology  []  EKG/Telemetry   []  Cardiology/Vascular   []  Pathology  []  Old records  []  Other:    Scheduled medications:  ampicillin-sulbactam, 3 g, Intravenous, Q6H  gabapentin, 300 mg, Oral, TID  heparin (porcine), 5,000 Units, Subcutaneous, Q8H  insulin lispro, 2-7 Units, Subcutaneous, 4x Daily AC & at Bedtime  sodium chloride, 10 mL, Intravenous, Q12H      As needed medications:    acetaminophen    senna-docusate sodium **AND** polyethylene glycol **AND** bisacodyl **AND** bisacodyl    dextrose    dextrose    glucagon (human recombinant)    ketorolac    sodium chloride    sodium chloride  Infusions:          Assessment & Plan   Assessment / Plan     Assessment:  Left great toe osteomyelitis  Diabetic foot wound with cellulitis  Poorly controlled diabetes mellitus  Possible septic arthritis  Peripheral neuropathy  Type 2 diabetes mellitus    Plan:  Patient admitted to the hospital for further workup and management of above  Continue amp sulbactam based off prior culture and sensitivity data, can transition to Augmentin at  discharge if needed  Blood cultures ordered no growth to date  Podiatry consulted, status post amputation on 7/11/2025  Continue basal bolus insulin  Healthy heart carb consistent diet  Fall precautions  Wound care consult  Continue postoperative pain control as needed, pain well-controlled  PT/OT  CBC, CMP reviewed 7/13/2025   Repeat CBC, CMP, mag and Phos in a.m. 7/13/2025      Reviewed patient's labs and imaging, and discussed with patient and nurse at bedside.    VTE Prophylaxis:  Pharmacologic VTE prophylaxis orders are present.    Disposition: Discharge home following culture results when okay with podiatry    CODE STATUS:   Code Status (Patient has no pulse and is not breathing): CPR (Attempt to Resuscitate)  Medical Interventions (Patient has pulse or is breathing): Full Support  Level Of Support Discussed With: Patient      Electronically signed by Jb Arevalo MD, 7/13/2025, 09:01 EDT.

## 2025-07-13 NOTE — PLAN OF CARE
Goal Outcome Evaluation:  Plan of Care Reviewed With: patient        Progress: improving  Outcome Evaluation: pt is alert and oriented x4. no complaints of pain this shift. wound/skin care done per order. pericolace given this am, pt had moderate bm. ambulated to bathroom standby assistance with walker, nonweight bearing on left foot. no new issues noted at this time.

## 2025-07-14 ENCOUNTER — READMISSION MANAGEMENT (OUTPATIENT)
Dept: CALL CENTER | Facility: HOSPITAL | Age: 65
End: 2025-07-14
Payer: COMMERCIAL

## 2025-07-14 VITALS
WEIGHT: 236.11 LBS | OXYGEN SATURATION: 96 % | RESPIRATION RATE: 18 BRPM | HEART RATE: 71 BPM | HEIGHT: 69 IN | BODY MASS INDEX: 34.97 KG/M2 | SYSTOLIC BLOOD PRESSURE: 115 MMHG | TEMPERATURE: 98.1 F | DIASTOLIC BLOOD PRESSURE: 69 MMHG

## 2025-07-14 LAB
BACTERIA SPEC AEROBE CULT: ABNORMAL
BACTERIA SPEC AEROBE CULT: ABNORMAL
GLUCOSE BLDC GLUCOMTR-MCNC: 111 MG/DL (ref 70–99)
GLUCOSE BLDC GLUCOMTR-MCNC: 127 MG/DL (ref 70–99)
GRAM STN SPEC: ABNORMAL

## 2025-07-14 PROCEDURE — 94799 UNLISTED PULMONARY SVC/PX: CPT

## 2025-07-14 PROCEDURE — 97161 PT EVAL LOW COMPLEX 20 MIN: CPT

## 2025-07-14 PROCEDURE — 82948 REAGENT STRIP/BLOOD GLUCOSE: CPT

## 2025-07-14 PROCEDURE — 25010000002 AMPICILLIN-SULBACTAM PER 1.5 G: Performed by: INTERNAL MEDICINE

## 2025-07-14 PROCEDURE — 99239 HOSP IP/OBS DSCHRG MGMT >30: CPT | Performed by: INTERNAL MEDICINE

## 2025-07-14 RX ORDER — OXYCODONE AND ACETAMINOPHEN 5; 325 MG/1; MG/1
1 TABLET ORAL EVERY 6 HOURS PRN
Qty: 12 TABLET | Refills: 0 | Status: SHIPPED | OUTPATIENT
Start: 2025-07-14 | End: 2025-07-18

## 2025-07-14 RX ADMIN — AMPICILLIN SODIUM AND SULBACTAM SODIUM 3 G: 2; 1 INJECTION, POWDER, FOR SOLUTION INTRAMUSCULAR; INTRAVENOUS at 02:57

## 2025-07-14 RX ADMIN — Medication 10 ML: at 09:09

## 2025-07-14 RX ADMIN — AMPICILLIN SODIUM AND SULBACTAM SODIUM 3 G: 2; 1 INJECTION, POWDER, FOR SOLUTION INTRAMUSCULAR; INTRAVENOUS at 09:09

## 2025-07-14 RX ADMIN — GABAPENTIN 300 MG: 300 CAPSULE ORAL at 09:08

## 2025-07-14 NOTE — PLAN OF CARE
Goal Outcome Evaluation:  Plan of Care Reviewed With: patient        Progress: no change  Outcome Evaluation: Pt presents with no physical limitations that impede his ability to return home independently or with assist from family as needed. Pt was encouraged to continue ambulating as tolerated while here at the hospital. He will be discharged from PT caseload.    Anticipated Discharge Disposition (PT): home with assist

## 2025-07-14 NOTE — THERAPY EVALUATION
Acute Care - Physical Therapy Initial Evaluation   Myrna     Patient Name: Idris Park  : 1960  MRN: [id number]  Today's Date: 2025      Visit Dx:     ICD-10-CM ICD-9-CM   1. Acute hematogenous osteomyelitis, unspecified site  M86.00 730.00   2. Difficulty walking  R26.2 719.7     Patient Active Problem List   Diagnosis    Benign essential tremor    Hyperlipidemia    Diabetes    Arthritis    Osteomyelitis     Past Medical History:   Diagnosis Date    Acromioclavicular separation     Arthritis     COVID-19     Diabetes     Foot ulcer     Hammer toe     Hyperlipidemia     Knee swelling     Limb pain     Limb swelling     Neuropathy in diabetes      Past Surgical History:   Procedure Laterality Date    AMPUTATION DIGIT Left 2025    Procedure: LEFT GREAT TOE AMPUTATION;  Surgeon: Ervin Whitehead DPM;  [place] [facility] [place] [facility] [place]  Service: Podiatry;  Laterality: Left;    DENTAL PROCEDURE      EXTERNAL EAR SURGERY Bilateral     HERNIA REPAIR  1967    also     INCISION AND DRAINAGE FOOT Left 2025    Procedure: INCISION AND DRAINAGE FOOT BURSAL SPACE;  Surgeon: Ervin Whitehead DPM;  [place] [facility] [place] [facility] [place]  Service: Podiatry;  Laterality: Left;    JOINT REPLACEMENT Left     Artificial Joints/Limbs    KNEE ARTHROPLASTY Right     OTHER SURGICAL HISTORY      JOINT SURGERY     PT Assessment (Last 12 Hours)       PT Evaluation and Treatment       Row Name 25 1300          Physical Therapy Time and Intention    Subjective Information no complaints  -CS     Document Type evaluation  -CS     Mode of Treatment individual therapy;physical therapy  -CS     Patient Effort good  -CS     Symptoms Noted During/After Treatment none  -CS       Row Name 25 1300          General Information    Patient Profile Reviewed yes  -CS     Patient Observations alert;cooperative;agree to therapy  -CS     Prior Level of Function independent:;all household mobility;gait;transfer;bed  mobility;ADL's  -CS     Equipment Currently Used at Home none  Pt does not use an AD for ambulation. Typically stands to shower. No home O2. Wife reports they have a BSC and rolling walker at home if needed  -CS     Existing Precautions/Restrictions fall;weight bearing  NWB LLE s/p great toe amputation on 7/11/25  -CS     Barriers to Rehab none identified  -CS       Row Name 07/14/25 1300          Living Environment    Current Living Arrangements home  -CS     Home Accessibility stairs to enter home  -CS     People in Home spouse  -CS     Primary Care Provided by self  -CS       Row Name 07/14/25 1300          Home Main Entrance    Number of Stairs, Main Entrance two  -CS     Stair Railings, Main Entrance railings safe and in good condition  -CS       Row Name 07/14/25 1300          Pain    Pretreatment Pain Rating 0/10 - no pain  -CS     Posttreatment Pain Rating 0/10 - no pain  -CS       Row Name 07/14/25 1300          Cognition    Orientation Status (Cognition) oriented x 3  -CS       Row Name 07/14/25 1300          Range of Motion Comprehensive    General Range of Motion no range of motion deficits identified  -CS       Row Name 07/14/25 1300          Strength Comprehensive (MMT)    General Manual Muscle Testing (MMT) Assessment no strength deficits identified  -CS       Row Name 07/14/25 1300          Mobility    Extremity Weight-bearing Status left lower extremity  -CS     Left Lower Extremity (Weight-bearing Status) non weight-bearing (NWB)  -       Row Name 07/14/25 1300          Bed Mobility    Bed Mobility bed mobility (all) activities  -CS     All Activities, [place] (Bed Mobility) independent  -CS     Bed Mobility, Safety Issues decreased use of legs for bridging/pushing  -CS       Row Name 07/14/25 1300          Transfers    Transfers sit-stand transfer  -CS     Maintains Weight-bearing Status (Transfers) able to maintain;verbal cues to maintain  -CS       Row Name 07/14/25 1300           Sit-Stand Transfer    Sit-Stand [place] (Transfers) modified independence  -CS     Assistive Device (Sit-Stand Transfers) walker, front-wheeled  -CS       Row Name 07/14/25 1300          Gait/Stairs (Locomotion)    Gait/Stairs Locomotion gait/ambulation assistive device  -CS     [place] Level (Gait) supervision  -CS     Assistive Device (Gait) walker, front-wheeled  -CS     Patient was able to Ambulate yes  -CS     Distance in Feet (Gait) 15  -CS     Pattern (Gait) step-to  -CS     Deviations/Abnormal Patterns (Gait) gait speed decreased;stride length decreased;antalgic  -CS     Left Sided Gait Deviations weight shift ability decreased  NWB  -CS       Row Name 07/14/25 1300          Safety Issues/Impairments Affecting Functional Mobility    Impairments Affecting Function (Mobility) balance;endurance/activity tolerance;strength;pain  -CS       Row Name 07/14/25 1300          Balance    Balance Assessment standing dynamic balance  -CS     Dynamic Standing Balance modified independence  -CS     Position/Device Used, Standing Balance supported;walker, front-wheeled  -CS       Row Name             Wound 07/26/24 1347 Right great toe    Wound - Properties Group Placement Date: 07/26/24  -KH Placement Time: 1347  -KH Side: Right  -KH Location: great toe  -KH Wound Outcome: --  -CHANDLER Removal Date: --  -CHANDLER Removal Time: --  -CHANDLER    Retired Wound - Properties Group Placement Date: 07/26/24  -KH Placement Time: 1347  -KH Side: Right  -KH Location: great toe  -KH Wound Outcome: --  -CHANDLER Removal Date: --  -CHANDLER Removal Time: --  -CHANDLER    Retired Wound - Properties Group Placement Date: 07/26/24  -KH Placement Time: 1347  -KH Side: Right  -KH Location: great toe  -KH Removal Date: --  -CHANDLER Removal Time: --  -CHANDLER Wound Outcome: --  -CHANDLER    Retired Wound - Properties Group Date first assessed: 07/26/24  -KH Time first assessed: 1347  -KH Side: Right  -KH Location: great toe  -KH Resolution Date: --  -CHANDLER Resolution Time: --  -CHANDLER  Wound Outcome: --  -CHANDLER      Row Name             Wound 07/11/25 0920 Left anterior foot Surgical Open Surgical Incision    Wound - Properties Group Placement Date: 07/11/25  -AZ Placement Time: 0920 -AZ Side: Left  -AZ Orientation: anterior  -AZ Location: foot  -AZ Primary Wound Type: Surgical  -AZ Secondary Wound Type - Surgical: Open Surg  -AZ    Retired Wound - Properties Group Placement Date: 07/11/25  -AZ Placement Time: 0920 -AZ Side: Left  -AZ Orientation: anterior  -AZ Location: foot  -AZ    Retired Wound - Properties Group Placement Date: 07/11/25  -AZ Placement Time: 0920 -AZ Side: Left  -AZ Orientation: anterior  -AZ Location: foot  -AZ    Retired Wound - Properties Group Date first assessed: 07/11/25 -AZ Time first assessed: 0920 -AZ Side: Left  -AZ Location: foot  -AZ      Row Name 07/14/25 1300          Plan of Care Review    Plan of Care Reviewed With patient  -CS     Progress no change  -CS     Outcome Evaluation Pt presents with no physical limitations that impede his ability to return home independently or with assist from family as needed. Pt was encouraged to continue ambulating as tolerated while here at the hospital. He will be discharged from PT caseload.  -CS       Row Name 07/14/25 1300          Positioning and Restraints    Pre-Treatment Position in bed  -CS     Post Treatment Position bed  -CS     In Bed sitting EOB;call light within reach;encouraged to call for assist  -CS       Row Name 07/14/25 1300          Therapy Assessment/Plan (PT)    Criteria for Skilled Interventions Met (PT) no problems identified which require skilled intervention  -CS     Therapy Frequency (PT) evaluation only  -CS       Row Name 07/14/25 1300          PT Evaluation Complexity    History, PT Evaluation Complexity 1-2 personal factors and/or comorbidities  -CS     Examination of Body Systems (PT Eval Complexity) total of 4 or more elements  -CS     Clinical Presentation (PT Evaluation Complexity) stable   -CS     Clinical Decision Making (PT Evaluation Complexity) low complexity  -CS     Overall Complexity (PT Evaluation Complexity) low complexity  -CS       Row Name 07/14/25 1300          Therapy Plan Review/Discharge Plan (PT)    Therapy Plan Review (PT) evaluation/treatment results reviewed;patient  -CS       Row Name 07/14/25 1300          Physical Therapy Goals    Problem Specific Goal Selection (PT) problem specific goal 1, PT  -CS       Row Name 07/14/25 1300          Problem Specific Goal 1 (PT)    Problem Specific Goal 1 (PT) Complete PT evaluatio  -CS     Time Frame (Problem Specific Goal 1, PT) by discharge  -CS     Progress/Outcome (Problem Specific Goal 1, PT) goal met  -CS               User Key  (r) = Recorded By, (t) = Taken By, (c) = Cosigned By      Initials Name Provider Type    Rubi Mittal, RN Registered Nurse    Madina Elizondo RN Registered Nurse    Di Bo RN Registered Nurse    Deneen Portillo, PT Physical Therapist                      PT Recommendation and Plan  Anticipated Discharge Disposition (PT): home with assist  Therapy Frequency (PT): evaluation only  Plan of Care Reviewed With: patient  Progress: no change  Outcome Evaluation: Pt presents with no physical limitations that impede his ability to return home independently or with assist from family as needed. Pt was encouraged to continue ambulating as tolerated while here at the hospital. He will be discharged from PT caseload.   Outcome Measures       Row Name 07/14/25 1300             How much help from another person do you currently need...    Turning from your back to your side while in flat bed without using bedrails? 4  -CS      Moving from lying on back to sitting on the side of a flat bed without bedrails? 4  -CS      Moving to and from a bed to a chair (including a wheelchair)? 3  -CS      Standing up from a chair using your arms (e.g., wheelchair, bedside chair)? 4  -CS      Climbing 3-5 steps  with a railing? 3  -CS      To walk in hospital room? 3  -CS      AM-PAC 6 Clicks Score (PT) 21  -CS         Functional Assessment    Outcome Measure Options AM-PAC 6 Clicks Basic Mobility (PT)  -CS                User Key  (r) = Recorded By, (t) = Taken By, (c) = Cosigned By      Initials Name Provider Type    Deneen Portillo, PT Physical Therapist                     Time Calculation:    PT Charges       Row Name 07/14/25 1326             Time Calculation    PT Received On 07/14/25  -CS         Untimed Charges    PT Eval/Re-eval Minutes 33  -CS         Total Minutes    Untimed Charges Total Minutes 33  -CS       Total Minutes 33  -CS                User Key  (r) = Recorded By, (t) = Taken By, (c) = Cosigned By      Initials Name Provider Type    Deneen Portillo PT Physical Therapist                  Therapy Charges for Today       Code Description Service Date Service Provider Modifiers Qty    [id number] HC PT EVAL LOW COMPLEXITY 3 7/14/2025 Deneen Wilson, PT GP 1            PT G-Codes  Outcome Measure Options: AM-PAC 6 Clicks Basic Mobility (PT)  AM-PAC 6 Clicks Score (PT): 21    Deneen Wilson, PT  7/14/2025

## 2025-07-14 NOTE — PLAN OF CARE
Goal Outcome Evaluation:           Progress: no change  Outcome Evaluation: Alert and oriented X4. No complaints of pain. NWB left foot, patient uses walker with standby assist. No needs or issues noted at this time.

## 2025-07-14 NOTE — OUTREACH NOTE
Prep Survey      Flowsheet Row Responses   [facility] patient discharged from? [facility]   Is LACE score < 7 ? No   Eligibility [facility]   Date of Admission 07/08/25   Date of Discharge 07/14/25   Discharge diagnosis Osteomyelitis- LEFT GREAT TOE AMPUTATION   Does the patient have one of the following disease processes/diagnoses(primary or secondary)? General Surgery   Prep survey completed? Yes            Lissette VALDEZ - Registered Nurse

## 2025-07-14 NOTE — DISCHARGE SUMMARY
[facility]    Patient Name: Idris Park  : 1960  MRN: [id number]    Date of Admission: 2025  Date of Discharge:  2025  Primary Care Physician: Dae Yan APRN    Consults       Date and Time Order Name Status Description    2025 12:00 PM Inpatient Podiatry Consult Completed             Active and Resolved Hospital Problems:  Left great toe osteomyelitis  Diabetic foot wound with cellulitis  Poorly controlled diabetes mellitus  Possible septic arthritis  Peripheral neuropathy  Type 2 diabetes mellitus    Hospital Course     Hospital Course:  Idris Park is a 65 y.o. male with a past medical history of peripheral neuropathy, hyperlipidemia, diabetes mellitus sent to the hospital as a direct admit from wound care clinic for evaluation and management of left great toe wound infection. Recent outpatient MRI showed osteomyelitis of the first distal phalanx, first proximal phalanx and distal portion of the first metatarsal, extensive cellulitis of the great toe. 15 x 11 x 10 mm abscess with fistulous tract extending to the dorsal surface of the first toe with evidence of resting of septic joint. Marked soft tissue edema of the dorsum of the foot and within the intermuscular soft tissue suggests cellulitis versus venous edema. Wound cultures from 2025 showed positive for Streptococcus and Staphylococcus aureus, susceptible to oxacillin. Patient follows Dr. Whitehead and also seen by podiatrist at [facility] on 7/3/2025 and his toenail has been removed during that visit.  Patient underwent great toe resection with good response.  Patient tolerated antibiotics.  Patient will be discharged on oral antibiotics to complete the full course.  Podiatry feels they got all the osteomyelitis.  Patient will follow closely with podiatry.  Patient to follow-up with her PCP in 3 to 7 days for further diabetes management.  Patient's A1c  is greater than 7.  Will start patient on Jardiance.  Patient may benefit from metformin as well.  Patient discharged home today in stable condition.    Day of Discharge     Vital Signs:  Temp:  [97.8 °F (36.6 °C)-98.8 °F (37.1 °C)] 98.1 °F (36.7 °C)  Heart Rate:  [62-72] 71  Resp:  [16-18] 18  BP: ()/(57-84) 115/69    Physical Exam:   GEN: No acute distress  HEENT: Moist mucous membranes  LUNGS: Equal chest rise bilaterally  CARDIAC: Regular rate and rhythm  NEURO: Moving all 4 extremities spontaneously  SKIN: Postoperative change of the foot noted dressing clean dry and intact    Discharge Details        Discharge Medications        New Medications        Instructions Start Date   amoxicillin-clavulanate 875-125 MG per tablet  Commonly known as: AUGMENTIN   1 tablet, Oral, Every 12 Hours Scheduled      empagliflozin 10 MG tablet tablet  Commonly known as: Jardiance   10 mg, Oral, Daily             Changes to Medications        Instructions Start Date   etodolac 300 MG capsule  Commonly known as: LODINE  What changed: See the new instructions.   TAKE 1 CAPSULE BY MOUTH TWICE A DAY WITH A MEAL             Continue These Medications        Instructions Start Date   gabapentin 300 MG capsule  Commonly known as: NEURONTIN   300 mg, Oral, 3 Times Daily             Stop These Medications      sulfamethoxazole-trimethoprim 800-160 MG per tablet  Commonly known as: BACTRIM DS,SEPTRA DS              Allergies   Allergen Reactions   • Cephalexin Rash     Has tolerated Ampicillin/Sulbactam and Amoxicillin/clavulanate -Phillip Akbar PharmD       Discharge Disposition:  Home or Self Care    Diet:  Hospital:  Diet Order   Procedures   • Diet: Cardiac, Diabetic; Healthy Heart (2-3 Na+); Consistent Carbohydrate; Fluid Consistency: Thin (IDDSI 0)       Discharge Activity:       CODE STATUS:  Code Status and Medical Interventions: CPR (Attempt to Resuscitate); Full Support   Ordered at: 07/08/25 1129     Code Status  (Patient has no pulse and is not breathing):    CPR (Attempt to Resuscitate)     Medical Interventions (Patient has pulse or is breathing):    Full Support     Level Of Support Discussed With:    Patient       Future Appointments   Date Time Provider Department Center   12/3/2025  7:30 AM Dae Yan APRN [facility] [place] [facility]   1/9/2026  8:30 AM Ervin Whitehead DPM [facility] POD [place] [facility]       Additional Instructions for the Follow-ups that You Need to Schedule       Discharge Follow-up with PCP   As directed       Currently Documented PCP:    Dae Yan APRN    PCP Phone Number:    [phone]     Follow Up Details: 3 to 7 days - needs to follow up for diabetes        Discharge Follow-up with Specified Provider: podiatry; 1 Week   As directed      To: podiatry   Follow Up: 1 Week                Pertinent  and/or Most Recent Results     IMAGING:  XR Foot 2 View Left  Result Date: 7/11/2025  XR FOOT 2 VW LEFT Date of Exam: 7/11/2025 10:12 AM EDT Indication: Left great toe amputation Post Surgical Procedure Comparison: None available. Findings: Postoperative changes are noted status post amputation of the first digit. Residual changes are seen involving the distal head of the first metatarsal bone. Residual soft tissue swelling is noted.     Impression: Postoperative changes status post amputation of the first digit. Electronically Signed: Deniz Sigala MD  7/11/2025 10:22 AM EDT  Workstation ID: [id number]    MRI Foot Left With & Without Contrast  Result Date: 6/30/2025  MRI FOOT LEFT W WO CONTRAST Date of Exam: 6/30/2025 7:08 PM EDT Indication: Probable osteomyelitis in Left Hallux and First Metatarsal shaft.  Comparison: Radiograph dated June 27 Technique:  Routine multiplanar/multisequence sequence images of the left foot were obtained before and after the uneventful administration of Multihance.  Findings: There is extensive edema in the superficial soft tissues of the dorsum of the foot as well as in the  plantar surface of the foot There is extensive intramuscular edema in the interosseous musculature of the foot. There are moderate degenerative changes in the midfoot with subchondral chondral edema and prominent spurring noted. The first toe demonstrates abnormal signal with extensive edema in the distal phalanx, proximal phalanx and distal portion of the first metatarsal In addition there are extensive subchondral cysts in the metatarsal head. There appears to be a fistulous tract extending from the distal portion of the first proximal phalanx and first interphalangeal joint with the tract communicating with the skin surface of the dorsum of the foot at this site There is an irregular enhancing fluid collection dorsal surface of the first toe distally in extending somewhat medial to the first distal phalanx This collection also appears to extend to the skin surface with perhaps a draining fistulous tract. This collection measures approximately 10 x 11 x 15 mm in size.     Impression: Osteomyelitis of the first distal phalanx, first proximal phalanx, and distal portion of the first metatarsal. Extensive cellulitis of the great toe 15 x 11 x 10 mm abscess with fistulous tracts extending to the dorsal surface of the first toe with evidence suggesting of septic joint. Marked soft tissue edema of the dorsum of the foot and within the intermuscular soft tissues suggests cellulitis versus venous edema. Electronically Signed: Rogerio Arnold MD  6/30/2025 9:07 PM EDT  Workstation ID: [id number]    XR Foot 3+ View Left  Result Date: 6/27/2025  XR FOOT 3+ VW LEFT Date of Exam: 6/27/2025 11:56 AM EDT Indication: wound Comparison: Left foot 8/23/2024 Findings: Evaluation of the left foot demonstrates extensive dorsal and first digit soft tissue swelling. No definite cortical disruption noted involving the first proximal phalanx or distal phalanx. However, there is lucency within the head of the first metatarsal bone which may  be related to prior fracture and bone resorption however additional infection not entirely excluded. There is joint space narrowing at the first DIP joint. Additional findings include degenerative changes in the midfoot with dorsal osteophyte. Plantar calcaneal spur is noted.     Impression: Lucency within the head of the first metatarsal bone some of which could be related to prior trauma however there is extensive soft tissue swelling involving the first digit and dorsal margin of the foot and underlying osteomyelitis not excluded. Further  evaluation with MRI recommended for better evaluation. Electronically Signed: Keyona Carlson MD  6/27/2025 12:19 PM EDT  Workstation ID: [id number]      LAB RESULTS:      Lab 07/13/25  0723 07/12/25 0423 07/11/25 0417 07/10/25  0421 07/08/25  1205 07/08/25  1154   WBC 5.89 6.93 7.24 6.67  --  7.64   HEMOGLOBIN 13.8 14.1 13.5 13.7  --  14.9   HEMATOCRIT 43.4 42.6 42.1 42.8  --  46.9   PLATELETS 263 275 269 264  --  284   NEUTROS ABS 3.49 4.47 4.97 4.46  --  4.95   IMMATURE GRANS (ABS) 0.01 0.03 0.02 0.02  --  0.03   LYMPHS ABS 1.28 1.49 1.36 1.24  --  1.64   MONOS ABS 0.50 0.55 0.54 0.47  --  0.48   EOS ABS 0.53* 0.34 0.30 0.41*  --  0.46*   MCV 91.4 90.6 90.5 92.0  --  92.7   PROCALCITONIN  --   --   --   --   --  0.06   LACTATE  --   --   --   --  1.6  --          Lab 07/13/25  0723 07/12/25  0423 07/11/25  0417 07/10/25  0421 07/08/25  1154   SODIUM 135* 136 135* 132* 130*   POTASSIUM 4.4 4.3 4.7 4.7 4.8   CHLORIDE 100 99 100 98 97*   CO2 22.1 24.3 22.8 23.5 18.4*   ANION GAP 12.9 12.7 12.2 10.5 14.6   BUN 13.8 12.8 18.5 15.4 10.3   CREATININE 1.06 1.05 1.16 1.17 1.19   EGFR 78.4 79.3 70.3 69.6 68.2   GLUCOSE 118* 138* 145* 140* 125*   CALCIUM 9.2 9.3 8.9 9.0 9.4   MAGNESIUM  --  2.2 2.1 2.1 2.1   PHOSPHORUS  --  3.6 4.6* 4.8* 3.9         Lab 07/13/25  0723 07/12/25  0423 07/11/25  0417 07/10/25  0421 07/08/25  1154   TOTAL PROTEIN 7.6 7.9 7.4 7.2 8.3   ALBUMIN 3.6 3.8 3.7  3.5 3.7   GLOBULIN 4.0 4.1 3.7 3.7 4.6   ALT (SGPT) 14 11 11 10 9   AST (SGOT) 18 16 14 12 16   BILIRUBIN 0.5 0.5 0.5 0.4 0.5   ALK PHOS 100 108 105 110 134*                     Brief Urine Lab Results  (Last result in the past 365 days)        Color   Clarity   Blood   Leuk Est   Nitrite   Protein   CREAT   Urine HCG        05/30/25 0839 Yellow   Clear   Negative   Negative   Negative   Negative           05/30/25 0839             126.8               Microbiology Results (last 10 days)       Procedure Component Value - Date/Time    Anaerobic Culture - Tissue, Toe, Left [[id number]]  (Normal) Collected: 07/11/25 0907    Lab Status: Preliminary result Specimen: Tissue from Toe, Left Updated: 07/14/25 0753     Anaerobic Culture No anaerobes isolated at 3 days    Tissue Culture, Quantitative - Tissue, Toe, Left [[id number]]  (Abnormal) Collected: 07/11/25 0907    Lab Status: Final result Specimen: Tissue from Toe, Left Updated: 07/14/25 0814     Tissue Culture <10,000 CFU/mL Gram Positive Cocci     Gram Stain Rare (1+) WBCs seen      Rare (1+) Gram positive cocci      Rare (1+) Epithelial cells seen    Narrative:      Refer to previous wound culture collected on 07/11/2025 0907 for MICs      Unable to quantitate further due to not having tissue weight available     Tissue / Bone Culture - Tissue, Toe, Left [[id number]]  (Abnormal)  (Susceptibility) Collected: 07/11/25 0907    Lab Status: Final result Specimen: Tissue from Toe, Left Updated: 07/14/25 0814     Tissue Culture Rare growth Staphylococcus aureus     Gram Stain Rare (1+) WBCs seen      Rare (1+) Gram positive cocci      Rare (1+) Epithelial cells seen    Susceptibility        Staphylococcus aureus      MELIA      Clindamycin Resistant      Erythromycin Resistant      Oxacillin Susceptible      Rifampin Susceptible      Tetracycline Resistant      Trimethoprim + Sulfamethoxazole Susceptible      Vancomycin Susceptible                       Susceptibility Comments        Staphylococcus aureus    This isolate is presumed to be clindamycin resistant based on detection of inducible clindamycin resistance.  Clindamycin may still be effective in some patients.               Wound Culture - Wound, Toe, Left [[id number]]  (Abnormal)  (Susceptibility) Collected: 07/08/25 1154    Lab Status: Final result Specimen: Wound from Toe, Left Updated: 07/11/25 0727     Wound Culture Rare growth Staphylococcus aureus      Rare growth Normal Skin Catie     Gram Stain Moderate (3+) WBCs seen      Few (2+) Gram positive cocci in pairs    Susceptibility        Staphylococcus aureus      MELIA      Clindamycin Resistant      Erythromycin Resistant      Oxacillin Susceptible      Rifampin Susceptible      Tetracycline Resistant      Trimethoprim + Sulfamethoxazole Susceptible      Vancomycin Susceptible                       Susceptibility Comments       Staphylococcus aureus    This isolate is presumed to be clindamycin resistant based on detection of inducible clindamycin resistance.  Clindamycin may still be effective in some patients.               Blood Culture - Blood, Hand, Left [[id number]]  (Normal) Collected: 07/08/25 1154    Lab Status: Final result Specimen: Blood from Hand, Left Updated: 07/13/25 1215     Blood Culture No growth at 5 days    Blood Culture - Blood, Hand, Right [[id number]]  (Normal) Collected: 07/08/25 1154    Lab Status: Final result Specimen: Blood from Hand, Right Updated: 07/13/25 1215     Blood Culture No growth at 5 days    Wound Culture - Wound, Toe, Left [[id number]]  (Abnormal) Collected: 07/08/25 1148    Lab Status: Final result Specimen: Wound from Toe, Left Updated: 07/11/25 0727     Wound Culture Rare growth Staphylococcus aureus      Rare growth Normal Skin Catie     Gram Stain Few (2+) WBCs seen      No organisms seen    Narrative:      Refer to previous wound culture collected on 07/08/2025 1154 for MICs.                  Time spent on Discharge including face to  face service: Greater than 30 minutes      Electronically signed by Jb Arevalo MD, 07/14/25, 12:23 PM EDT.

## 2025-07-15 ENCOUNTER — TRANSITIONAL CARE MANAGEMENT TELEPHONE ENCOUNTER (OUTPATIENT)
Dept: CALL CENTER | Facility: HOSPITAL | Age: 65
End: 2025-07-15
Payer: COMMERCIAL

## 2025-07-15 LAB
CYTO UR: NORMAL
LAB AP CASE REPORT: NORMAL
LAB AP CLINICAL INFORMATION: NORMAL
PATH REPORT.FINAL DX SPEC: NORMAL
PATH REPORT.GROSS SPEC: NORMAL

## 2025-07-15 NOTE — OUTREACH NOTE
Call Center TCM Note      Flowsheet Row Responses   [facility] patient discharged from? [facility]   Does the patient have one of the following disease processes/diagnoses(primary or secondary)? General Surgery   TCM attempt successful? Yes   Call start time 1305   Call end time 1310   Person spoke with today (if not patient) and relationship Patient   Meds reviewed with patient/caregiver? Yes   Does the patient have all medications related to this admission filled (includes all antibiotics, pain medications, etc.) Yes   Is the patient taking all medications as directed (includes completed medication regime)? Yes   Comments PCP Dae CARRILLO. Hospital follow up appt in place for 7/18  230pm.  [Patient has podiatry follow up on 7/22 with Dr Whitehead]   Does the patient have an appointment with their PCP within 7-14 days of discharge? Yes   Has home health visited the patient within 72 hours of discharge? N/A   Psychosocial issues? No   Comments Patient to call podiatry office and ask about home wound care / dressing changes needed until follow up with that office next week.   Did the patient receive a copy of their discharge instructions? Yes   Nursing interventions Reviewed instructions with patient   What is the patient's perception of their health status since discharge? Improving   Is the patient /caregiver able to teach back basic post-op care? --  [wearing post op shoe and try to keep weight off of front of operative foot.]   Is the patient/caregiver able to teach back signs and symptoms of incisional infection? Increased drainage or bleeding, Pus or odor from incision, Fever   Is the patient/caregiver able to teach back steps to recovery at home? Set small, achievable goals for return to baseline health   If the patient is a current smoker, are they able to teach back resources for cessation? Not a smoker   Is the patient/caregiver able to teach back the hierarchy of who to call/visit for  symptoms/problems? PCP, Specialist, Home health nurse, Urgent Care, ED, 911 Yes   TCM call completed? Yes   Wrap up additional comments Patient reports that he has to  some lancets to be able to monitor home blood sugar.   Call end time 1310   Would this patient benefit from a Referral to [facility] Social Work? No   Is the patient interested in additional calls from an ambulatory ? No            MILLA SETHI - Registered Nurse    7/15/2025, 13:14 EDT

## 2025-07-16 DIAGNOSIS — E11.65 TYPE 2 DIABETES MELLITUS WITH HYPERGLYCEMIA, WITHOUT LONG-TERM CURRENT USE OF INSULIN: Primary | ICD-10-CM

## 2025-07-16 LAB — BACTERIA SPEC ANAEROBE CULT: NORMAL

## 2025-07-16 RX ORDER — HYDROCHLOROTHIAZIDE 12.5 MG/1
CAPSULE ORAL
Qty: 6 EACH | Refills: 1 | Status: SHIPPED | OUTPATIENT
Start: 2025-07-16

## 2025-07-17 ENCOUNTER — TELEPHONE (OUTPATIENT)
Dept: FAMILY MEDICINE CLINIC | Facility: CLINIC | Age: 65
End: 2025-07-17
Payer: COMMERCIAL

## 2025-07-17 NOTE — TELEPHONE ENCOUNTER
PA for ozempic was approved and back dated 6/18/2025-6/18/2026    Insurance faxing approval letter

## 2025-07-18 ENCOUNTER — TELEMEDICINE (OUTPATIENT)
Dept: FAMILY MEDICINE CLINIC | Facility: CLINIC | Age: 65
End: 2025-07-18
Payer: COMMERCIAL

## 2025-07-18 DIAGNOSIS — S29.012A MUSCLE STRAIN OF LEFT UPPER BACK, INITIAL ENCOUNTER: ICD-10-CM

## 2025-07-18 DIAGNOSIS — M86.9 OSTEOMYELITIS OF LEFT FOOT, UNSPECIFIED TYPE: ICD-10-CM

## 2025-07-18 DIAGNOSIS — E11.65 TYPE 2 DIABETES MELLITUS WITH HYPERGLYCEMIA, WITHOUT LONG-TERM CURRENT USE OF INSULIN: Primary | ICD-10-CM

## 2025-07-18 NOTE — PROGRESS NOTES
Transitional Care Follow Up Visit  Subjective     Idris Park is a 65 y.o. male who presents for a transitional care management visit.    Within 48 business hours after discharge our office contacted him via telephone to coordinate his care and needs.      I reviewed and discussed the details of that call along with the discharge summary, hospital problems, inpatient lab results, inpatient diagnostic studies, and consultation reports with Idris.     Current outpatient and discharge medications have been reconciled for the patient.  Reviewed by: LORENA Olivares          7/14/2025     4:49 PM   Date of TCM Phone Call   [facility]   Date of Admission 7/8/2025   Date of Discharge 7/14/2025     Risk for Readmission (LACE) Score: 9 (7/14/2025  6:00 AM)      History of Present Illness  Upper back and neck have been sore not sure  last blood sugar was 155 and was 110 this am fasting.  Wife has changed dressing.  Notes that mild blood on first and no pus noted.  Back Pain  Associated symptoms: no chest pain       Course During Hospital Stay:  65 y.o. male with a past medical history of peripheral neuropathy, hyperlipidemia, diabetes mellitus sent to the hospital as a direct admit from wound care clinic for evaluation and management of left great toe wound infection. Recent outpatient MRI showed osteomyelitis of the first distal phalanx, first proximal phalanx and distal portion of the first metatarsal, extensive cellulitis of the great toe. 15 x 11 x 10 mm abscess with fistulous tract extending to the dorsal surface of the first toe with evidence of resting of septic joint. Marked soft tissue edema of the dorsum of the foot and within the intermuscular soft tissue suggests cellulitis versus venous edema. Wound cultures from 6/27/2025 showed positive for Streptococcus and Staphylococcus aureus, susceptible to oxacillin. Patient follows Dr. Whitehead and also seen by podiatrist at [facility] on 7/3/2025 and his toenail has been removed during that visit.  Patient underwent great toe resection with good response.  Patient tolerated antibiotics.  Patient will be discharged on oral antibiotics to complete the full course.       The following portions of the patient's history were reviewed and updated as appropriate: allergies, current medications, past family history, past medical history, past social history, past surgical history, and problem list.    Review of Systems   Constitutional:  Negative for appetite change.   HENT:  Negative for postnasal drip.    Respiratory:  Negative for cough, chest tightness and shortness of breath.    Cardiovascular:  Negative for chest pain and leg swelling.   Genitourinary:  Negative for difficulty urinating.   Musculoskeletal:  Positive for back pain.   Neurological:  Negative for dizziness.   Psychiatric/Behavioral:  Negative for sleep disturbance.        Objective   There were no vitals taken for this visit.  Physical Exam  Constitutional:       Appearance: He is not ill-appearing.   HENT:      Head: Normocephalic.   Pulmonary:      Effort: Pulmonary effort is normal.   Neurological:      Mental Status: He is alert and oriented to person, place, and time.         Assessment & Plan   Diagnoses and all orders for this visit:    1. Type 2 diabetes mellitus with hyperglycemia, without long-term current use of insulin (Primary)  -     empagliflozin (Jardiance) 10 MG tablet tablet; Take 1 tablet by mouth Daily.  Dispense: 90 tablet; Refill: 1  -     Hemoglobin A1c; Future    2. Muscle strain of left upper back, initial encounter    3. Osteomyelitis of left foot, unspecified type  -     amoxicillin-clavulanate (AUGMENTIN) 875-125 MG per tablet; Take 1 tablet by mouth Every 12 (Twelve) Hours for 5 doses. Indications: Bone and/or Joint Infection  Dispense: 5 tablet; Refill: 0      Try the muscle relaxer and see if helps with muscle strain.

## 2025-07-22 ENCOUNTER — OFFICE VISIT (OUTPATIENT)
Dept: PODIATRY | Facility: CLINIC | Age: 65
End: 2025-07-22
Payer: COMMERCIAL

## 2025-07-22 VITALS
WEIGHT: 235 LBS | SYSTOLIC BLOOD PRESSURE: 136 MMHG | TEMPERATURE: 96.9 F | HEART RATE: 81 BPM | OXYGEN SATURATION: 92 % | BODY MASS INDEX: 34.8 KG/M2 | DIASTOLIC BLOOD PRESSURE: 86 MMHG | HEIGHT: 69 IN

## 2025-07-22 DIAGNOSIS — M86.172 OSTEOMYELITIS OF ANKLE OR FOOT, ACUTE, LEFT: Primary | ICD-10-CM

## 2025-07-22 DIAGNOSIS — M79.672 FOOT PAIN, LEFT: ICD-10-CM

## 2025-07-22 DIAGNOSIS — S98.132A AMPUTATION OF TOE OF LEFT FOOT: ICD-10-CM

## 2025-07-22 DIAGNOSIS — E11.42 TYPE 2 DIABETES MELLITUS WITH POLYNEUROPATHY: ICD-10-CM

## 2025-07-22 DIAGNOSIS — G62.9 NEUROPATHY: ICD-10-CM

## 2025-07-22 NOTE — PROGRESS NOTES
[facility] - PODIATRY    Today's Date: 07/22/25    Patient Name: Idris Park  MRN: [id number]  CSN: [id number]  PCP: Dae Yan APRN  Referring Provider: No ref. provider found    SUBJECTIVE     Chief Complaint   Patient presents with    Left Foot - Post-op Follow-up     Post great toe amputation     HPI: Idris Park, a 65 y.o.male, presents to clinic.    Procedure: Left great toe amputation at metatarsophalangeal joint   Date: 11 July 2025    Patient states they are doing well without complications.  Patient states they are following post-op instructions.  Patient states pain is controlled.      Patient denies any fevers, chills, nausea, vomiting, shortness of breath, nor any other constitutional signs nor symptoms.      No other pedal complaints at this time.    Recent medical changes: None    Past Medical History:   Diagnosis Date    Acromioclavicular separation 2004    Arthritis     COVID-19     Diabetes     Foot ulcer     Hammer toe     Hyperlipidemia     Knee swelling 2000    Limb pain     Limb swelling     Neuropathy in diabetes      Past Surgical History:   Procedure Laterality Date    AMPUTATION DIGIT Left 7/11/2025    Procedure: LEFT GREAT TOE AMPUTATION;  Surgeon: Ervin Whitehead DPM;  Location: [facility];  Service: Podiatry;  Laterality: Left;    DENTAL PROCEDURE      EXTERNAL EAR SURGERY Bilateral     HERNIA REPAIR  1967    also 2011    INCISION AND DRAINAGE FOOT Left 7/11/2025    Procedure: INCISION AND DRAINAGE FOOT BURSAL SPACE;  Surgeon: Ervin Whitehead DPM;  Location: [facility];  Service: Podiatry;  Laterality: Left;    JOINT REPLACEMENT Left     Artificial Joints/Limbs    KNEE ARTHROPLASTY Right     OTHER SURGICAL HISTORY      JOINT SURGERY     Family History   Problem Relation Age of Onset    Cancer Mother     Breast cancer Mother 50     Social History     Socioeconomic History    Marital status:    Tobacco Use    Smoking status: Never      Passive exposure: Past    Smokeless tobacco: Never   Vaping Use    Vaping status: Never Used   Substance and Sexual Activity    Alcohol use: Not Currently     Comment: socially-BE[employer] MONTHLY    Drug use: Never    Sexual activity: Yes     Partners: Female     Birth control/protection: None     Allergies   Allergen Reactions    Cephalexin Rash     Has tolerated Ampicillin/Sulbactam and Amoxicillin/clavulanate -Phillip Akbar, PharmD     Current Outpatient Medications   Medication Sig Dispense Refill    Continuous Glucose Sensor (FreeStyle Ella 3 Plus Sensor) Use Every 14 (Fourteen) Days. 6 each 1    empagliflozin (Jardiance) 10 MG tablet tablet Take 1 tablet by mouth Daily. 90 tablet 1    gabapentin (NEURONTIN) 300 MG capsule Take 1 capsule by mouth 3 (Three) Times a Day. 270 capsule 0     No current facility-administered medications for this visit.       OBJECTIVE     Vitals:    07/22/25 0759   BP: 136/86   Pulse: 81   Temp: 96.9 °F (36.1 °C)   SpO2: 92%       Patient seen in no apparent distress.      PHYSICAL EXAM:     Neurovascular Status:  unchanged  Musculoskelatal:  Status:  unchanged  Derm Status: Left foot shows dressing is dry and intact without signs of breakthrough.  Left great toe amputation site shows sutures intact with skin edges well-coapted with no signs of dehiscence.  Healthy surgical skin edges.  No drainage present.  No edema, erythema, calor, lymphangitis, nor signs of infection seen..    ASSESSMENT/PLAN     Diagnoses and all orders for this visit:    1. Osteomyelitis of ankle or foot, acute, left (Primary)    2. Foot pain, left    3. Amputation of toe of left foot    4. Neuropathy    5. Type 2 diabetes mellitus with polyneuropathy        Comprehensive lower extremity examination and evaluation was performed.    Discussed findings and treatment plan including risks, benefits, and treatment options with patient in detail. Patient agreed with treatment plan.    Wound care to left great toe  amputation site: Dab with Betadine cover with dry sterile noncompressive dressing.    Patient is to not weight bear to the affected foot at this time.  Patient states understanding and agreement with this plan.    An After Visit Summary was printed and given to the patient at discharge, including (if requested) any available informative/educational handouts regarding diagnosis, treatment, or medications. All questions were answered to patient/family satisfaction. Should symptoms fail to improve or worsen they agree to call or return to clinic or to go to the Emergency Department. Discussed the importance of following up with any needed screening tests/labs/specialist appointments and any requested follow-up recommended by me today. Importance of maintaining follow-up discussed and patient accepts that missed appointments can delay diagnosis and potentially lead to worsening of conditions.    Return in about 2 weeks (around 8/5/2025) for Post Operative, Suture Removal, No X-ray needed., or sooner if acute issues arise.    This document has been electronically signed by Ervin Whitehead DPM on July 22, 2025 09:31 EDT

## 2025-08-05 ENCOUNTER — OFFICE VISIT (OUTPATIENT)
Dept: PODIATRY | Facility: CLINIC | Age: 65
End: 2025-08-05
Payer: COMMERCIAL

## 2025-08-05 VITALS
DIASTOLIC BLOOD PRESSURE: 77 MMHG | SYSTOLIC BLOOD PRESSURE: 117 MMHG | TEMPERATURE: 98 F | OXYGEN SATURATION: 95 % | BODY MASS INDEX: 32.93 KG/M2 | WEIGHT: 223 LBS | HEART RATE: 73 BPM

## 2025-08-05 DIAGNOSIS — M86.172 OSTEOMYELITIS OF ANKLE OR FOOT, ACUTE, LEFT: Primary | ICD-10-CM

## 2025-08-05 DIAGNOSIS — S98.132A AMPUTATION OF TOE OF LEFT FOOT: ICD-10-CM

## 2025-08-05 DIAGNOSIS — G62.9 NEUROPATHY: ICD-10-CM

## 2025-08-05 DIAGNOSIS — E11.42 TYPE 2 DIABETES MELLITUS WITH POLYNEUROPATHY: ICD-10-CM

## 2025-08-05 DIAGNOSIS — M79.672 FOOT PAIN, LEFT: ICD-10-CM

## 2025-08-07 ENCOUNTER — OFFICE VISIT (OUTPATIENT)
Dept: WOUND CARE | Facility: HOSPITAL | Age: 65
End: 2025-08-07
Payer: COMMERCIAL

## 2025-08-07 VITALS
DIASTOLIC BLOOD PRESSURE: 86 MMHG | TEMPERATURE: 97.8 F | RESPIRATION RATE: 18 BRPM | SYSTOLIC BLOOD PRESSURE: 119 MMHG | HEART RATE: 60 BPM

## 2025-08-07 DIAGNOSIS — E11.628 TYPE 2 DIABETES MELLITUS WITH LEFT DIABETIC FOOT INFECTION: ICD-10-CM

## 2025-08-07 DIAGNOSIS — T14.8XXA WOUND INFECTION: ICD-10-CM

## 2025-08-07 DIAGNOSIS — E11.621 DIABETIC ULCER OF TOE OF LEFT FOOT ASSOCIATED WITH TYPE 2 DIABETES MELLITUS, WITH NECROSIS OF BONE: Primary | ICD-10-CM

## 2025-08-07 DIAGNOSIS — L08.9 WOUND INFECTION: ICD-10-CM

## 2025-08-07 DIAGNOSIS — L97.524 DIABETIC ULCER OF TOE OF LEFT FOOT ASSOCIATED WITH TYPE 2 DIABETES MELLITUS, WITH NECROSIS OF BONE: Primary | ICD-10-CM

## 2025-08-07 DIAGNOSIS — L08.9 TYPE 2 DIABETES MELLITUS WITH LEFT DIABETIC FOOT INFECTION: ICD-10-CM

## 2025-08-07 DIAGNOSIS — T86.821 FAILED FLAP: ICD-10-CM

## 2025-08-07 PROCEDURE — G0463 HOSPITAL OUTPT CLINIC VISIT: HCPCS | Performed by: PHYSICIAN ASSISTANT

## 2025-08-20 ENCOUNTER — OFFICE VISIT (OUTPATIENT)
Dept: WOUND CARE | Facility: HOSPITAL | Age: 65
End: 2025-08-20
Payer: COMMERCIAL

## 2025-08-20 VITALS
DIASTOLIC BLOOD PRESSURE: 83 MMHG | RESPIRATION RATE: 18 BRPM | SYSTOLIC BLOOD PRESSURE: 125 MMHG | TEMPERATURE: 97.8 F | HEART RATE: 63 BPM

## 2025-08-20 DIAGNOSIS — T86.821 FAILED FLAP: ICD-10-CM

## 2025-08-20 DIAGNOSIS — E11.621 DIABETIC ULCER OF TOE OF LEFT FOOT ASSOCIATED WITH TYPE 2 DIABETES MELLITUS, WITH NECROSIS OF BONE: Primary | ICD-10-CM

## 2025-08-20 DIAGNOSIS — E11.628 TYPE 2 DIABETES MELLITUS WITH LEFT DIABETIC FOOT INFECTION: ICD-10-CM

## 2025-08-20 DIAGNOSIS — L97.524 DIABETIC ULCER OF TOE OF LEFT FOOT ASSOCIATED WITH TYPE 2 DIABETES MELLITUS, WITH NECROSIS OF BONE: Primary | ICD-10-CM

## 2025-08-20 DIAGNOSIS — L08.9 TYPE 2 DIABETES MELLITUS WITH LEFT DIABETIC FOOT INFECTION: ICD-10-CM

## 2025-08-20 PROCEDURE — G0463 HOSPITAL OUTPT CLINIC VISIT: HCPCS | Performed by: PHYSICIAN ASSISTANT

## (undated) DEVICE — BANDAGE,GAUZE,BULKEE II,4.5"X4.1YD,STRL: Brand: MEDLINE

## (undated) DEVICE — GLV SURG SENSICARE PI ORTHO SZ9 LF STRL

## (undated) DEVICE — GAUZE,SPONGE,4"X4",16PLY,STRL,LF,10/TRAY: Brand: MEDLINE

## (undated) DEVICE — SOL IRR NACL 0.9PCT BO 1000ML

## (undated) DEVICE — DISPOSABLE TOURNIQUET CUFF SINGLE BLADDER, SINGLE PORT AND QUICK CONNECT CONNECTOR: Brand: COLOR CUFF

## (undated) DEVICE — ESMARK: Brand: DEROYAL

## (undated) DEVICE — APPL CHLORAPREP HI/LITE 26ML ORNG

## (undated) DEVICE — INTENDED FOR TISSUE SEPARATION, AND OTHER PROCEDURES THAT REQUIRE A SHARP SURGICAL BLADE TO PUNCTURE OR CUT.: Brand: BARD-PARKER ® CARBON RIB-BACK BLADES

## (undated) DEVICE — PENCL SMOKE/EVAC MEGADYNE TELESCP 10FT

## (undated) DEVICE — DRSNG WND GZ CURAD OIL EMULSION 3X3IN STRL

## (undated) DEVICE — GOWN,SIRUS,POLYRNF,BRTHSLV,2XL,18/CS: Brand: MEDLINE

## (undated) DEVICE — BNDG ELAS CO-FLEX SLF ADHR 4IN5YD LF STRL

## (undated) DEVICE — SYR LL TP 10ML STRL

## (undated) DEVICE — THE STERILE LIGHT HANDLE COVER IS USED WITH STERIS SURGICAL LIGHTING AND VISUALIZATION SYSTEMS.

## (undated) DEVICE — DRSNG PAD ABD 8X10IN STRL

## (undated) DEVICE — SUT ETHLN 3-0 FS118IN 663H

## (undated) DEVICE — GOWN,SIRUS,POLYRNF,BRTHSLV,XLN/XXL,18/CS: Brand: MEDLINE

## (undated) DEVICE — STANDARD HYPODERMIC NEEDLE,POLYPROPYLENE HUB: Brand: MONOJECT

## (undated) DEVICE — EXTREMITY-LF: Brand: MEDLINE INDUSTRIES, INC.